# Patient Record
Sex: MALE | Race: WHITE | Employment: OTHER | URBAN - METROPOLITAN AREA
[De-identification: names, ages, dates, MRNs, and addresses within clinical notes are randomized per-mention and may not be internally consistent; named-entity substitution may affect disease eponyms.]

---

## 2017-01-18 ENCOUNTER — TELEPHONE (OUTPATIENT)
Dept: INFECTIOUS DISEASES | Age: 67
End: 2017-01-18

## 2017-01-31 ENCOUNTER — TELEPHONE (OUTPATIENT)
Dept: INFECTIOUS DISEASES | Age: 67
End: 2017-01-31

## 2017-03-09 ENCOUNTER — HOSPITAL ENCOUNTER (OUTPATIENT)
Dept: WOUND CARE | Age: 67
Discharge: HOME | End: 2017-03-09
Attending: PODIATRIST | Admitting: PODIATRIST

## 2017-03-09 VITALS
RESPIRATION RATE: 20 BRPM | SYSTOLIC BLOOD PRESSURE: 108 MMHG | HEIGHT: 76 IN | TEMPERATURE: 98.4 F | BODY MASS INDEX: 31.92 KG/M2 | WEIGHT: 262.13 LBS | DIASTOLIC BLOOD PRESSURE: 69 MMHG | HEART RATE: 67 BPM

## 2017-03-09 DIAGNOSIS — Z79.4 CONTROLLED TYPE 2 DIABETES MELLITUS WITH COMPLICATION, WITH LONG-TERM CURRENT USE OF INSULIN (HCC): ICD-10-CM

## 2017-03-09 DIAGNOSIS — L97.509 TYPE 2 DIABETES MELLITUS WITH FOOT ULCER, WITH LONG-TERM CURRENT USE OF INSULIN (HCC): ICD-10-CM

## 2017-03-09 DIAGNOSIS — Z79.4 TYPE 2 DIABETES MELLITUS WITH FOOT ULCER, WITH LONG-TERM CURRENT USE OF INSULIN (HCC): ICD-10-CM

## 2017-03-09 DIAGNOSIS — I73.9 PERIPHERAL VASCULAR DISEASE, UNSPECIFIED (HCC): Primary | ICD-10-CM

## 2017-03-09 DIAGNOSIS — E11.621 TYPE 2 DIABETES MELLITUS WITH FOOT ULCER, WITH LONG-TERM CURRENT USE OF INSULIN (HCC): ICD-10-CM

## 2017-03-09 DIAGNOSIS — E11.8 CONTROLLED TYPE 2 DIABETES MELLITUS WITH COMPLICATION, WITH LONG-TERM CURRENT USE OF INSULIN (HCC): ICD-10-CM

## 2017-03-09 LAB
HCT VFR BLD CALC: 41.1 % (ref 40.5–52.5)
HEMOGLOBIN: 13.9 G/DL (ref 13.5–17.5)
MCH RBC QN AUTO: 30.6 PG (ref 26–34)
MCHC RBC AUTO-ENTMCNC: 33.9 G/DL (ref 31–36)
MCV RBC AUTO: 90.3 FL (ref 80–100)
PDW BLD-RTO: 13.2 % (ref 12.4–15.4)
PLATELET # BLD: 305 K/UL (ref 135–450)
PMV BLD AUTO: 9.1 FL (ref 5–10.5)
RBC # BLD: 4.55 M/UL (ref 4.2–5.9)
SEDIMENTATION RATE, ERYTHROCYTE: 28 MM/HR (ref 0–20)
WBC # BLD: 9.8 K/UL (ref 4–11)

## 2017-03-09 RX ORDER — LIDOCAINE 50 MG/G
OINTMENT TOPICAL ONCE
Status: DISCONTINUED | OUTPATIENT
Start: 2017-03-09 | End: 2017-03-10 | Stop reason: HOSPADM

## 2017-03-10 ENCOUNTER — HOSPITAL ENCOUNTER (OUTPATIENT)
Dept: VASCULAR LAB | Age: 67
Discharge: HOME | End: 2017-03-10
Attending: PODIATRIST | Admitting: PODIATRIST

## 2017-03-10 DIAGNOSIS — I73.9 PERIPHERAL VASCULAR DISEASE (HCC): ICD-10-CM

## 2017-03-10 LAB
ESTIMATED AVERAGE GLUCOSE: 159.9 MG/DL
HBA1C MFR BLD: 7.2 %

## 2017-03-16 ENCOUNTER — HOSPITAL ENCOUNTER (OUTPATIENT)
Dept: WOUND CARE | Age: 67
Discharge: HOME | End: 2017-03-16
Attending: PODIATRIST | Admitting: PODIATRIST

## 2017-03-16 VITALS — SYSTOLIC BLOOD PRESSURE: 108 MMHG | DIASTOLIC BLOOD PRESSURE: 69 MMHG | TEMPERATURE: 98.6 F | HEART RATE: 87 BPM

## 2017-03-16 RX ORDER — LIDOCAINE 50 MG/G
OINTMENT TOPICAL PRN
Status: DISCONTINUED | OUTPATIENT
Start: 2017-03-16 | End: 2017-03-17 | Stop reason: HOSPADM

## 2017-03-23 ENCOUNTER — HOSPITAL ENCOUNTER (OUTPATIENT)
Dept: WOUND CARE | Age: 67
Discharge: HOME | End: 2017-03-23
Attending: PODIATRIST | Admitting: PODIATRIST

## 2017-03-23 VITALS
DIASTOLIC BLOOD PRESSURE: 73 MMHG | TEMPERATURE: 97.6 F | HEART RATE: 66 BPM | RESPIRATION RATE: 20 BRPM | SYSTOLIC BLOOD PRESSURE: 137 MMHG

## 2017-03-23 RX ORDER — LIDOCAINE 50 MG/G
OINTMENT TOPICAL ONCE
Status: DISCONTINUED | OUTPATIENT
Start: 2017-03-23 | End: 2017-03-24 | Stop reason: HOSPADM

## 2017-03-27 ENCOUNTER — HOSPITAL ENCOUNTER (OUTPATIENT)
Dept: WOUND CARE | Age: 67
Discharge: HOME | End: 2017-03-27
Attending: PODIATRIST | Admitting: PODIATRIST

## 2017-03-27 VITALS
DIASTOLIC BLOOD PRESSURE: 69 MMHG | SYSTOLIC BLOOD PRESSURE: 106 MMHG | TEMPERATURE: 97.5 F | RESPIRATION RATE: 20 BRPM | HEART RATE: 91 BPM

## 2017-03-27 RX ORDER — LANCETS 33 GAUGE
EACH MISCELLANEOUS
Qty: 100 EACH | Refills: 5 | Status: SHIPPED | OUTPATIENT
Start: 2017-03-27 | End: 2018-03-28 | Stop reason: SDUPTHER

## 2017-03-27 RX ORDER — ATORVASTATIN CALCIUM 10 MG/1
TABLET, FILM COATED ORAL
Qty: 90 TABLET | Refills: 3 | Status: SHIPPED | OUTPATIENT
Start: 2017-03-27 | End: 2018-05-01 | Stop reason: SDUPTHER

## 2017-03-30 ENCOUNTER — HOSPITAL ENCOUNTER (OUTPATIENT)
Dept: WOUND CARE | Age: 67
Discharge: HOME | End: 2017-03-30
Attending: PODIATRIST | Admitting: PODIATRIST

## 2017-03-30 VITALS
TEMPERATURE: 98.3 F | DIASTOLIC BLOOD PRESSURE: 73 MMHG | SYSTOLIC BLOOD PRESSURE: 120 MMHG | RESPIRATION RATE: 20 BRPM | HEART RATE: 65 BPM

## 2017-03-30 ASSESSMENT — PAIN SCALES - GENERAL: PAINLEVEL_OUTOF10: 0

## 2017-04-03 ENCOUNTER — HOSPITAL ENCOUNTER (OUTPATIENT)
Dept: WOUND CARE | Age: 67
Discharge: HOME | End: 2017-04-03
Attending: PODIATRIST | Admitting: PODIATRIST

## 2017-04-03 VITALS
DIASTOLIC BLOOD PRESSURE: 74 MMHG | SYSTOLIC BLOOD PRESSURE: 124 MMHG | RESPIRATION RATE: 18 BRPM | HEART RATE: 67 BPM | TEMPERATURE: 98.1 F

## 2017-04-03 ASSESSMENT — PAIN SCALES - GENERAL: PAINLEVEL_OUTOF10: 0

## 2017-04-06 ENCOUNTER — HOSPITAL ENCOUNTER (OUTPATIENT)
Dept: WOUND CARE | Age: 67
Discharge: HOME | End: 2017-04-06
Attending: PODIATRIST | Admitting: PODIATRIST

## 2017-04-06 VITALS
TEMPERATURE: 97.9 F | DIASTOLIC BLOOD PRESSURE: 74 MMHG | RESPIRATION RATE: 20 BRPM | SYSTOLIC BLOOD PRESSURE: 106 MMHG | HEART RATE: 70 BPM

## 2017-04-06 ASSESSMENT — PAIN SCALES - GENERAL: PAINLEVEL_OUTOF10: 0

## 2017-04-20 ENCOUNTER — HOSPITAL ENCOUNTER (OUTPATIENT)
Dept: WOUND CARE | Age: 67
Discharge: HOME | End: 2017-04-20
Admitting: PODIATRIST

## 2017-04-20 VITALS
HEART RATE: 60 BPM | TEMPERATURE: 98.3 F | SYSTOLIC BLOOD PRESSURE: 119 MMHG | RESPIRATION RATE: 20 BRPM | DIASTOLIC BLOOD PRESSURE: 70 MMHG

## 2017-04-20 RX ORDER — LIDOCAINE 50 MG/G
OINTMENT TOPICAL ONCE
Status: DISCONTINUED | OUTPATIENT
Start: 2017-04-20 | End: 2017-04-21 | Stop reason: HOSPADM

## 2017-05-03 RX ORDER — CLOPIDOGREL BISULFATE 75 MG/1
TABLET ORAL
Qty: 90 TABLET | Refills: 3 | Status: SHIPPED | OUTPATIENT
Start: 2017-05-03 | End: 2018-05-07 | Stop reason: ALTCHOICE

## 2017-05-03 RX ORDER — GLIPIZIDE 5 MG/1
TABLET ORAL
Qty: 360 TABLET | Refills: 3 | Status: SHIPPED | OUTPATIENT
Start: 2017-05-03 | End: 2018-06-17 | Stop reason: SDUPTHER

## 2017-05-04 ENCOUNTER — HOSPITAL ENCOUNTER (OUTPATIENT)
Dept: WOUND CARE | Age: 67
Discharge: HOME | End: 2017-05-04
Attending: PODIATRIST | Admitting: PODIATRIST

## 2017-05-04 VITALS
TEMPERATURE: 98.6 F | WEIGHT: 256.39 LBS | SYSTOLIC BLOOD PRESSURE: 119 MMHG | HEART RATE: 70 BPM | DIASTOLIC BLOOD PRESSURE: 67 MMHG | RESPIRATION RATE: 20 BRPM | BODY MASS INDEX: 31.21 KG/M2

## 2017-06-01 ENCOUNTER — HOSPITAL ENCOUNTER (OUTPATIENT)
Dept: WOUND CARE | Age: 67
Discharge: HOME | End: 2017-06-01
Attending: PODIATRIST | Admitting: PODIATRIST

## 2017-06-01 VITALS
BODY MASS INDEX: 30.77 KG/M2 | DIASTOLIC BLOOD PRESSURE: 63 MMHG | SYSTOLIC BLOOD PRESSURE: 117 MMHG | RESPIRATION RATE: 20 BRPM | HEIGHT: 76 IN | HEART RATE: 76 BPM | TEMPERATURE: 98.1 F | WEIGHT: 252.65 LBS

## 2017-06-08 ENCOUNTER — HOSPITAL ENCOUNTER (OUTPATIENT)
Dept: WOUND CARE | Age: 67
Discharge: HOME | End: 2017-06-08
Attending: NURSE PRACTITIONER | Admitting: NURSE PRACTITIONER

## 2017-06-08 VITALS
HEART RATE: 69 BPM | RESPIRATION RATE: 18 BRPM | WEIGHT: 251.77 LBS | BODY MASS INDEX: 30.66 KG/M2 | SYSTOLIC BLOOD PRESSURE: 122 MMHG | HEIGHT: 76 IN | DIASTOLIC BLOOD PRESSURE: 72 MMHG | TEMPERATURE: 98.1 F

## 2017-06-08 DIAGNOSIS — L97.522 SKIN ULCER OF TOE OF LEFT FOOT WITH FAT LAYER EXPOSED (HCC): Primary | ICD-10-CM

## 2017-06-08 PROCEDURE — 11042 DBRDMT SUBQ TIS 1ST 20SQCM/<: CPT | Performed by: NURSE PRACTITIONER

## 2017-06-08 RX ORDER — LIDOCAINE 50 MG/G
OINTMENT TOPICAL ONCE
Status: DISCONTINUED | OUTPATIENT
Start: 2017-06-08 | End: 2017-06-09 | Stop reason: HOSPADM

## 2017-06-08 ASSESSMENT — PAIN SCALES - GENERAL: PAINLEVEL_OUTOF10: 0

## 2017-06-15 ENCOUNTER — HOSPITAL ENCOUNTER (OUTPATIENT)
Dept: WOUND CARE | Age: 67
Discharge: HOME | End: 2017-06-15
Attending: PODIATRIST | Admitting: PODIATRIST

## 2017-06-15 VITALS
DIASTOLIC BLOOD PRESSURE: 68 MMHG | TEMPERATURE: 98.6 F | SYSTOLIC BLOOD PRESSURE: 121 MMHG | HEART RATE: 76 BPM | RESPIRATION RATE: 18 BRPM

## 2017-07-06 ENCOUNTER — HOSPITAL ENCOUNTER (OUTPATIENT)
Dept: WOUND CARE | Age: 67
Discharge: HOME | End: 2017-07-06
Attending: PODIATRIST | Admitting: PODIATRIST

## 2017-07-06 VITALS
DIASTOLIC BLOOD PRESSURE: 78 MMHG | SYSTOLIC BLOOD PRESSURE: 139 MMHG | WEIGHT: 256.39 LBS | HEIGHT: 76 IN | HEART RATE: 67 BPM | BODY MASS INDEX: 31.22 KG/M2 | TEMPERATURE: 98.1 F | RESPIRATION RATE: 18 BRPM

## 2017-07-06 RX ORDER — LIDOCAINE 50 MG/G
OINTMENT TOPICAL PRN
Status: DISCONTINUED | OUTPATIENT
Start: 2017-07-06 | End: 2017-07-07 | Stop reason: HOSPADM

## 2017-07-13 ENCOUNTER — HOSPITAL ENCOUNTER (OUTPATIENT)
Dept: WOUND CARE | Age: 67
Discharge: HOME | End: 2017-07-13
Attending: PODIATRIST | Admitting: PODIATRIST

## 2017-07-13 VITALS
TEMPERATURE: 98.3 F | RESPIRATION RATE: 20 BRPM | DIASTOLIC BLOOD PRESSURE: 77 MMHG | HEART RATE: 67 BPM | SYSTOLIC BLOOD PRESSURE: 134 MMHG

## 2017-07-17 RX ORDER — INSULIN DETEMIR 100 [IU]/ML
INJECTION, SOLUTION SUBCUTANEOUS
Qty: 15 PEN | Refills: 5 | Status: SHIPPED | OUTPATIENT
Start: 2017-07-17 | End: 2018-05-30

## 2017-07-20 ENCOUNTER — HOSPITAL ENCOUNTER (OUTPATIENT)
Dept: WOUND CARE | Age: 67
Discharge: HOME | End: 2017-07-20
Attending: PODIATRIST | Admitting: PODIATRIST

## 2017-07-20 VITALS
SYSTOLIC BLOOD PRESSURE: 143 MMHG | DIASTOLIC BLOOD PRESSURE: 78 MMHG | RESPIRATION RATE: 18 BRPM | TEMPERATURE: 98.1 F | HEART RATE: 64 BPM

## 2017-07-20 RX ORDER — LIDOCAINE 50 MG/G
OINTMENT TOPICAL ONCE
Status: DISCONTINUED | OUTPATIENT
Start: 2017-07-20 | End: 2017-07-21 | Stop reason: HOSPADM

## 2017-07-20 ASSESSMENT — PAIN SCALES - GENERAL: PAINLEVEL_OUTOF10: 0

## 2017-07-27 ENCOUNTER — HOSPITAL ENCOUNTER (OUTPATIENT)
Dept: WOUND CARE | Age: 67
Discharge: HOME | End: 2017-07-27
Attending: PODIATRIST | Admitting: PODIATRIST

## 2017-08-03 ENCOUNTER — HOSPITAL ENCOUNTER (OUTPATIENT)
Dept: WOUND CARE | Age: 67
Discharge: HOME | End: 2017-08-03
Attending: PODIATRIST | Admitting: PODIATRIST

## 2017-08-03 VITALS
TEMPERATURE: 98.6 F | HEART RATE: 80 BPM | HEIGHT: 76 IN | RESPIRATION RATE: 18 BRPM | DIASTOLIC BLOOD PRESSURE: 79 MMHG | BODY MASS INDEX: 30.31 KG/M2 | WEIGHT: 248.9 LBS | SYSTOLIC BLOOD PRESSURE: 144 MMHG

## 2017-08-03 RX ORDER — LIDOCAINE 50 MG/G
OINTMENT TOPICAL ONCE
Status: DISCONTINUED | OUTPATIENT
Start: 2017-08-03 | End: 2017-08-04 | Stop reason: HOSPADM

## 2017-08-03 ASSESSMENT — PAIN SCALES - GENERAL: PAINLEVEL_OUTOF10: 0

## 2017-08-10 ENCOUNTER — HOSPITAL ENCOUNTER (OUTPATIENT)
Dept: WOUND CARE | Age: 67
Discharge: HOME | End: 2017-08-10
Attending: PODIATRIST | Admitting: PODIATRIST

## 2017-08-10 VITALS
HEART RATE: 96 BPM | SYSTOLIC BLOOD PRESSURE: 119 MMHG | DIASTOLIC BLOOD PRESSURE: 73 MMHG | TEMPERATURE: 98.3 F | RESPIRATION RATE: 18 BRPM

## 2017-08-10 RX ORDER — LIDOCAINE 50 MG/G
OINTMENT TOPICAL PRN
Status: DISCONTINUED | OUTPATIENT
Start: 2017-08-10 | End: 2017-08-11 | Stop reason: HOSPADM

## 2017-08-24 ENCOUNTER — HOSPITAL ENCOUNTER (OUTPATIENT)
Dept: WOUND CARE | Age: 67
Discharge: HOME | End: 2017-08-24
Attending: PODIATRIST | Admitting: PODIATRIST

## 2017-08-24 VITALS
SYSTOLIC BLOOD PRESSURE: 110 MMHG | TEMPERATURE: 98.4 F | HEART RATE: 75 BPM | RESPIRATION RATE: 20 BRPM | DIASTOLIC BLOOD PRESSURE: 70 MMHG

## 2017-08-24 RX ORDER — LIDOCAINE 50 MG/G
OINTMENT TOPICAL ONCE
Status: DISCONTINUED | OUTPATIENT
Start: 2017-08-24 | End: 2017-08-25 | Stop reason: HOSPADM

## 2017-09-12 ENCOUNTER — OFFICE VISIT (OUTPATIENT)
Dept: INTERNAL MEDICINE CLINIC | Age: 67
End: 2017-09-12

## 2017-09-12 VITALS
SYSTOLIC BLOOD PRESSURE: 130 MMHG | WEIGHT: 245 LBS | DIASTOLIC BLOOD PRESSURE: 78 MMHG | HEART RATE: 70 BPM | RESPIRATION RATE: 16 BRPM | BODY MASS INDEX: 29.82 KG/M2

## 2017-09-12 DIAGNOSIS — R41.3 MEMORY IMPAIRMENT: ICD-10-CM

## 2017-09-12 DIAGNOSIS — R63.4 WEIGHT LOSS: Primary | ICD-10-CM

## 2017-09-12 DIAGNOSIS — Z23 NEED FOR INFLUENZA VACCINATION: ICD-10-CM

## 2017-09-12 DIAGNOSIS — Z23 NEED FOR PNEUMOCOCCAL VACCINATION: ICD-10-CM

## 2017-09-12 DIAGNOSIS — N40.1 BENIGN NON-NODULAR PROSTATIC HYPERPLASIA WITH LOWER URINARY TRACT SYMPTOMS: ICD-10-CM

## 2017-09-12 DIAGNOSIS — E11.8 CONTROLLED TYPE 2 DIABETES MELLITUS WITH COMPLICATION, WITH LONG-TERM CURRENT USE OF INSULIN (HCC): ICD-10-CM

## 2017-09-12 DIAGNOSIS — Z79.4 CONTROLLED TYPE 2 DIABETES MELLITUS WITH COMPLICATION, WITH LONG-TERM CURRENT USE OF INSULIN (HCC): ICD-10-CM

## 2017-09-12 LAB
A/G RATIO: 1.6 (ref 1.1–2.2)
ALBUMIN SERPL-MCNC: 4.2 G/DL (ref 3.4–5)
ALP BLD-CCNC: 80 U/L (ref 40–129)
ALT SERPL-CCNC: 13 U/L (ref 10–40)
ANION GAP SERPL CALCULATED.3IONS-SCNC: 15 MMOL/L (ref 3–16)
AST SERPL-CCNC: 16 U/L (ref 15–37)
BASOPHILS ABSOLUTE: 0.1 K/UL (ref 0–0.2)
BASOPHILS RELATIVE PERCENT: 0.6 %
BILIRUB SERPL-MCNC: 0.8 MG/DL (ref 0–1)
BUN BLDV-MCNC: 19 MG/DL (ref 7–20)
CALCIUM SERPL-MCNC: 9.3 MG/DL (ref 8.3–10.6)
CHLORIDE BLD-SCNC: 100 MMOL/L (ref 99–110)
CO2: 25 MMOL/L (ref 21–32)
CREAT SERPL-MCNC: 1 MG/DL (ref 0.8–1.3)
EOSINOPHILS ABSOLUTE: 0.2 K/UL (ref 0–0.6)
EOSINOPHILS RELATIVE PERCENT: 2.9 %
GFR AFRICAN AMERICAN: >60
GFR NON-AFRICAN AMERICAN: >60
GLOBULIN: 2.7 G/DL
GLUCOSE BLD-MCNC: 111 MG/DL (ref 70–99)
HBA1C MFR BLD: 7.2 %
HCT VFR BLD CALC: 36.6 % (ref 40.5–52.5)
HEMOGLOBIN: 12.6 G/DL (ref 13.5–17.5)
LYMPHOCYTES ABSOLUTE: 1 K/UL (ref 1–5.1)
LYMPHOCYTES RELATIVE PERCENT: 12.2 %
MCH RBC QN AUTO: 31.1 PG (ref 26–34)
MCHC RBC AUTO-ENTMCNC: 34.3 G/DL (ref 31–36)
MCV RBC AUTO: 90.7 FL (ref 80–100)
MONOCYTES ABSOLUTE: 0.8 K/UL (ref 0–1.3)
MONOCYTES RELATIVE PERCENT: 9.5 %
NEUTROPHILS ABSOLUTE: 6.2 K/UL (ref 1.7–7.7)
NEUTROPHILS RELATIVE PERCENT: 74.8 %
PDW BLD-RTO: 13.1 % (ref 12.4–15.4)
PLATELET # BLD: 350 K/UL (ref 135–450)
PMV BLD AUTO: 8.7 FL (ref 5–10.5)
POTASSIUM SERPL-SCNC: 4.2 MMOL/L (ref 3.5–5.1)
PROSTATE SPECIFIC ANTIGEN: 0.37 NG/ML (ref 0–4)
RBC # BLD: 4.04 M/UL (ref 4.2–5.9)
SEDIMENTATION RATE, ERYTHROCYTE: 41 MM/HR (ref 0–20)
SODIUM BLD-SCNC: 140 MMOL/L (ref 136–145)
TOTAL PROTEIN: 6.9 G/DL (ref 6.4–8.2)
WBC # BLD: 8.3 K/UL (ref 4–11)

## 2017-09-12 PROCEDURE — 3017F COLORECTAL CA SCREEN DOC REV: CPT | Performed by: INTERNAL MEDICINE

## 2017-09-12 PROCEDURE — 83036 HEMOGLOBIN GLYCOSYLATED A1C: CPT | Performed by: INTERNAL MEDICINE

## 2017-09-12 PROCEDURE — G8427 DOCREV CUR MEDS BY ELIG CLIN: HCPCS | Performed by: INTERNAL MEDICINE

## 2017-09-12 PROCEDURE — G8419 CALC BMI OUT NRM PARAM NOF/U: HCPCS | Performed by: INTERNAL MEDICINE

## 2017-09-12 PROCEDURE — 90732 PPSV23 VACC 2 YRS+ SUBQ/IM: CPT | Performed by: INTERNAL MEDICINE

## 2017-09-12 PROCEDURE — 90662 IIV NO PRSV INCREASED AG IM: CPT | Performed by: INTERNAL MEDICINE

## 2017-09-12 PROCEDURE — 4040F PNEUMOC VAC/ADMIN/RCVD: CPT | Performed by: INTERNAL MEDICINE

## 2017-09-12 PROCEDURE — G0009 ADMIN PNEUMOCOCCAL VACCINE: HCPCS | Performed by: INTERNAL MEDICINE

## 2017-09-12 PROCEDURE — 99214 OFFICE O/P EST MOD 30 MIN: CPT | Performed by: INTERNAL MEDICINE

## 2017-09-12 PROCEDURE — 36415 COLL VENOUS BLD VENIPUNCTURE: CPT | Performed by: INTERNAL MEDICINE

## 2017-09-12 PROCEDURE — G8598 ASA/ANTIPLAT THER USED: HCPCS | Performed by: INTERNAL MEDICINE

## 2017-09-12 PROCEDURE — G0008 ADMIN INFLUENZA VIRUS VAC: HCPCS | Performed by: INTERNAL MEDICINE

## 2017-09-12 PROCEDURE — 1123F ACP DISCUSS/DSCN MKR DOCD: CPT | Performed by: INTERNAL MEDICINE

## 2017-09-12 PROCEDURE — 3046F HEMOGLOBIN A1C LEVEL >9.0%: CPT | Performed by: INTERNAL MEDICINE

## 2017-09-12 PROCEDURE — 1036F TOBACCO NON-USER: CPT | Performed by: INTERNAL MEDICINE

## 2017-09-12 ASSESSMENT — ENCOUNTER SYMPTOMS
GASTROINTESTINAL NEGATIVE: 1
EYES NEGATIVE: 1
RESPIRATORY NEGATIVE: 1
COUGH: 0
BACK PAIN: 0
CHEST TIGHTNESS: 0
SHORTNESS OF BREATH: 0

## 2017-09-12 ASSESSMENT — PATIENT HEALTH QUESTIONNAIRE - PHQ9
1. LITTLE INTEREST OR PLEASURE IN DOING THINGS: 0
SUM OF ALL RESPONSES TO PHQ QUESTIONS 1-9: 0
2. FEELING DOWN, DEPRESSED OR HOPELESS: 0
SUM OF ALL RESPONSES TO PHQ9 QUESTIONS 1 & 2: 0

## 2017-09-13 ENCOUNTER — NURSE ONLY (OUTPATIENT)
Dept: INTERNAL MEDICINE CLINIC | Age: 67
End: 2017-09-13

## 2017-09-13 ENCOUNTER — TELEPHONE (OUTPATIENT)
Dept: INTERNAL MEDICINE CLINIC | Age: 67
End: 2017-09-13

## 2017-09-13 DIAGNOSIS — D64.9 ANEMIA, UNSPECIFIED TYPE: Primary | ICD-10-CM

## 2017-09-13 DIAGNOSIS — D64.9 ANEMIA, UNSPECIFIED TYPE: ICD-10-CM

## 2017-09-13 LAB
FERRITIN: 61.6 NG/ML (ref 30–400)
FOLATE: 18.88 NG/ML (ref 4.78–24.2)
IRON SATURATION: 13 % (ref 20–50)
IRON: 40 UG/DL (ref 59–158)
TOTAL IRON BINDING CAPACITY: 299 UG/DL (ref 260–445)
TSH REFLEX: 1.23 UIU/ML (ref 0.27–4.2)
VITAMIN B-12: 305 PG/ML (ref 211–911)

## 2017-09-13 PROCEDURE — 36415 COLL VENOUS BLD VENIPUNCTURE: CPT | Performed by: INTERNAL MEDICINE

## 2017-09-25 ENCOUNTER — OFFICE VISIT (OUTPATIENT)
Dept: INTERNAL MEDICINE CLINIC | Age: 67
End: 2017-09-25

## 2017-09-25 VITALS
SYSTOLIC BLOOD PRESSURE: 126 MMHG | HEART RATE: 82 BPM | WEIGHT: 242 LBS | TEMPERATURE: 98.8 F | RESPIRATION RATE: 18 BRPM | BODY MASS INDEX: 29.46 KG/M2 | DIASTOLIC BLOOD PRESSURE: 80 MMHG

## 2017-09-25 DIAGNOSIS — R63.4 WEIGHT LOSS: Primary | ICD-10-CM

## 2017-09-25 DIAGNOSIS — L23.7 ALLERGIC CONTACT DERMATITIS DUE TO PLANTS, EXCEPT FOOD: ICD-10-CM

## 2017-09-25 PROCEDURE — 1036F TOBACCO NON-USER: CPT | Performed by: INTERNAL MEDICINE

## 2017-09-25 PROCEDURE — G8427 DOCREV CUR MEDS BY ELIG CLIN: HCPCS | Performed by: INTERNAL MEDICINE

## 2017-09-25 PROCEDURE — 4040F PNEUMOC VAC/ADMIN/RCVD: CPT | Performed by: INTERNAL MEDICINE

## 2017-09-25 PROCEDURE — 3017F COLORECTAL CA SCREEN DOC REV: CPT | Performed by: INTERNAL MEDICINE

## 2017-09-25 PROCEDURE — 1123F ACP DISCUSS/DSCN MKR DOCD: CPT | Performed by: INTERNAL MEDICINE

## 2017-09-25 PROCEDURE — G8419 CALC BMI OUT NRM PARAM NOF/U: HCPCS | Performed by: INTERNAL MEDICINE

## 2017-09-25 PROCEDURE — G8598 ASA/ANTIPLAT THER USED: HCPCS | Performed by: INTERNAL MEDICINE

## 2017-09-25 PROCEDURE — 99213 OFFICE O/P EST LOW 20 MIN: CPT | Performed by: INTERNAL MEDICINE

## 2017-09-25 ASSESSMENT — ENCOUNTER SYMPTOMS: GASTROINTESTINAL NEGATIVE: 1

## 2017-09-27 ENCOUNTER — NURSE ONLY (OUTPATIENT)
Dept: INTERNAL MEDICINE CLINIC | Age: 67
End: 2017-09-27

## 2017-09-27 DIAGNOSIS — D64.9 ANEMIA, UNSPECIFIED TYPE: Primary | ICD-10-CM

## 2017-09-27 LAB
HEMOCCULT STL QL: POSITIVE

## 2017-10-19 ENCOUNTER — HOSPITAL ENCOUNTER (OUTPATIENT)
Dept: ENDOSCOPY | Age: 67
Discharge: HOME | End: 2017-10-19
Attending: INTERNAL MEDICINE | Admitting: INTERNAL MEDICINE

## 2017-10-19 VITALS
HEART RATE: 81 BPM | OXYGEN SATURATION: 99 % | TEMPERATURE: 98.5 F | DIASTOLIC BLOOD PRESSURE: 70 MMHG | HEIGHT: 76 IN | RESPIRATION RATE: 19 BRPM | BODY MASS INDEX: 28.98 KG/M2 | WEIGHT: 238 LBS | SYSTOLIC BLOOD PRESSURE: 126 MMHG

## 2017-10-19 LAB
GLUCOSE BLD-MCNC: 90 MG/DL (ref 70–99)
PERFORMED ON: NORMAL

## 2017-10-19 RX ORDER — ONDANSETRON 2 MG/ML
4 INJECTION INTRAMUSCULAR; INTRAVENOUS
Status: ACTIVE | OUTPATIENT
Start: 2017-10-19 | End: 2017-10-19

## 2017-10-19 RX ORDER — SODIUM CHLORIDE 9 MG/ML
INJECTION, SOLUTION INTRAVENOUS CONTINUOUS
Status: DISCONTINUED | OUTPATIENT
Start: 2017-10-19 | End: 2017-10-20 | Stop reason: HOSPADM

## 2017-10-19 RX ORDER — SODIUM CHLORIDE 0.9 % (FLUSH) 0.9 %
10 SYRINGE (ML) INJECTION EVERY 12 HOURS SCHEDULED
Status: DISCONTINUED | OUTPATIENT
Start: 2017-10-19 | End: 2017-10-20 | Stop reason: HOSPADM

## 2017-10-19 RX ORDER — SODIUM CHLORIDE 0.9 % (FLUSH) 0.9 %
10 SYRINGE (ML) INJECTION PRN
Status: DISCONTINUED | OUTPATIENT
Start: 2017-10-19 | End: 2017-10-20 | Stop reason: HOSPADM

## 2017-10-19 RX ADMIN — SODIUM CHLORIDE: 9 INJECTION, SOLUTION INTRAVENOUS at 11:38

## 2017-10-19 ASSESSMENT — PAIN - FUNCTIONAL ASSESSMENT: PAIN_FUNCTIONAL_ASSESSMENT: 0-10

## 2017-10-19 ASSESSMENT — PAIN SCALES - GENERAL
PAINLEVEL_OUTOF10: 0

## 2017-10-19 NOTE — ANESTHESIA POST-OP
[facility] Department of Anesthesiology  Post-Anesthesia Note       Name:  Kareen Ramirez                                         Age:  75 y.o.   MRN:  [id number]     Last Vitals & Oxygen Saturation: /70   Pulse 81   Temp 98.5 °F (36.9 °C) (Temporal)   Resp 19   Ht 6' 4\" (1.93 m)   Wt 238 lb (108 kg)   SpO2 99%   BMI 28.97 kg/m²   Patient Vitals for the past 4 hrs:   BP Temp Temp src Pulse Resp SpO2 Height Weight   10/19/17 1252 126/70 - - 81 19 99 % - -   10/19/17 1242 104/62 - - 87 17 99 % - -   10/19/17 1232 (!) 95/47 98.5 °F (36.9 °C) Temporal 99 17 99 % - -   10/19/17 1136 (!) 135/93 97.8 °F (36.6 °C) Tympanic 98 18 98 % 6' 4\" (1.93 m) 238 lb (108 kg)       Level of consciousness: awake, alert and oriented    Respiratory: stable     Cardiovascular: stable     Hydration: stable     PONV: stable     Post-op pain: adequate analgesia    Post-op assessment: no apparent anesthetic complications    Complications:  none    Galilea Dash MD  October 19, 2017   1:25 PM

## 2017-10-19 NOTE — PROGRESS NOTES
Dr Kyung Curtis at bedside speaking with patient & wife. Both expressed understanding of discharge instructions.

## 2017-10-19 NOTE — BRIEF OP NOTE
Brief Postoperative Note  Jamel Garcia  1950  [id number]    Previous Colonoscopy: Yes  Date: 11/23/09  Greater than 3 years?  Yes    Pre-operative Diagnosis: Guaiac positive stool    Post-operative Diagnosis: Same    Procedure: Colonoscopy    Anesthesia: MAC    Surgeons/Assistants: Mariann    Estimated Blood Loss: None    Complications: None    Specimens: Was Not Obtained    Findings: See dictated report    Electronically signed by Jaycee Steele MD, on 10/19/2017, at 12:32 PM

## 2017-10-19 NOTE — ANESTHESIA PRE-OP
Department of Anesthesiology  Preprocedure Note       Name:  Juan Hoover   Age:  75 y.o.  :  1950                                          MRN:  [id number]         Date:  10/19/2017        [facility] Department of Anesthesiology  Pre-Anesthesia Evaluation/Consultation       Name:  Juan Hoover                                         Age:  75 y.o.   MRN:  [id number]           Procedure (Scheduled):  colonoscopy  Surgeon:  Dr. Philomena Guadarrama      No Known Allergies  Patient Active Problem List   Diagnosis    Skin ulcer of toe of left foot with fat layer exposed ([address]    Diabetes mellitus ([address]    BPH with obstruction/lower urinary tract symptoms    ED (erectile dysfunction)    Cerebellar infarct    Diabetic nephropathy with proteinuria ([address]    Diabetic peripheral neuropathy ([address]    Diabetes mellitus type 2, uncontrolled ([address]    Essential hypertension    Controlled type 2 diabetes mellitus with complication, with long-term current use of insulin (HCC)    Mixed hyperlipidemia    Toe osteomyelitis, left ([address]    Septic arthritis of interphalangeal joint of toe of left foot ([address]    Diabetic foot infection ([address]     Past Medical History:   Diagnosis Date    Arthritis     Cerebellar infarct ([address]     Cerebral artery occlusion with cerebral infarction ([address]       DIZZY  COULDN'T WALK   RESOLVED    Diabetes mellitus ([address]     Diabetic nephropathy with proteinuria (HCC)     Diabetic peripheral neuropathy (HCC)     Disease of blood and blood forming organ     Hyperlipidemia     Hypertension     Movement disorder      Past Surgical History:   Procedure Laterality Date    COLONOSCOPY      ENDOSCOPY, COLON, DIAGNOSTIC      FOOT SURGERY Right     Ulcer surgery    HYPOSPADIAS CORRECTION      OTHER SURGICAL HISTORY Left 2016    INCISION AND DRAINAGE LEFT HALLUX         PILONIDAL CYST EXCISION      TONSILLECTOMY       Social History   Substance Use Topics    Smoking status: Former Smoker     Packs/day: 1.00     Years: 13.00     Types: Cigarettes     Quit date: 7/20/1981    Smokeless tobacco: Never Used    Alcohol use Yes      Comment: WINE 3 X A MONTH     Medications  Current Outpatient Prescriptions on File Prior to Encounter   Medication Sig Dispense Refill    LEVEMIR FLEXTOUCH 100 UNIT/ML injection pen INJECT 35 UNITS UNDER THE SKIN NIGHTLY 15 Pen 5    glipiZIDE (GLUCOTROL) 5 MG tablet TAKE TWO TABLETS BY MOUTH TWICE A  tablet 3    clopidogrel (PLAVIX) 75 MG tablet Take 1 tablet by mouth  daily 90 tablet 3    metFORMIN (GLUCOPHAGE) 850 MG tablet TAKE 1 TABLET TWICE A DAY  WITH MEALS 180 tablet 3    atorvastatin (LIPITOR) 10 MG tablet TAKE ONE TABLET BY MOUTH DAILY 90 tablet 3    ONETOUCH DELICA LANCETS 59D MISC USE TO TEST ONCE DAILY 100 each 5    glucose blood VI test strips (ONE TOUCH ULTRA TEST) strip Test daily. ICD-10:  E11.65  Diabetes Type II Uncontrolled 100 strip 3    lisinopril (PRINIVIL;ZESTRIL) 10 MG tablet Take 1 tablet by mouth daily 90 tablet 3    B-D ULTRAFINE III SHORT PEN 31G X 8 MM MISC USE ONCE DAILY WITH LEVEMIR FLEX  each 2    ACCU-CHEK BILLY strip TEST TWO TIMES A DAY 50 strip 2     No current facility-administered medications on file prior to encounter. Current Outpatient Prescriptions   Medication Sig Dispense Refill    LEVEMIR FLEXTOUCH 100 UNIT/ML injection pen INJECT 35 UNITS UNDER THE SKIN NIGHTLY 15 Pen 5    glipiZIDE (GLUCOTROL) 5 MG tablet TAKE TWO TABLETS BY MOUTH TWICE A  tablet 3    clopidogrel (PLAVIX) 75 MG tablet Take 1 tablet by mouth  daily 90 tablet 3    metFORMIN (GLUCOPHAGE) 850 MG tablet TAKE 1 TABLET TWICE A DAY  WITH MEALS 180 tablet 3    atorvastatin (LIPITOR) 10 MG tablet TAKE ONE TABLET BY MOUTH DAILY 90 tablet 3    ONETOUCH DELICA LANCETS 14S MISC USE TO TEST ONCE DAILY 100 each 5    glucose blood VI test strips (ONE TOUCH ULTRA TEST) strip Test daily.  ICD-10:  E11.65  Diabetes Type II Uncontrolled 100 strip 3    lisinopril (PRINIVIL;ZESTRIL) 10 MG tablet Take 1 tablet by mouth daily 90 tablet 3    B-D ULTRAFINE III SHORT PEN 31G X 8 MM MISC USE ONCE DAILY WITH LEVEMIR FLEX  each 2    ACCU-CHEK BILLY strip TEST TWO TIMES A DAY 50 strip 2     No current facility-administered medications for this encounter. Vital Signs (Current) There were no vitals filed for this visit. Vital Signs Statistics (for past 48 hrs)     No Data Recorded    BP Readings from Last 3 Encounters:   09/25/17 126/80   09/12/17 130/78   08/24/17 110/70     BMI  There is no height or weight on file to calculate BMI. Estimated body mass index is 28.61 kg/m² as calculated from the following:    Height as of 10/13/17: 6' 4\" (1.93 m). Weight as of 10/13/17: 235 lb (106.6 kg). CBC   Lab Results   Component Value Date    WBC 8.3 09/12/2017    RBC 4.04 09/12/2017    HGB 12.6 09/12/2017    HCT 36.6 09/12/2017    MCV 90.7 09/12/2017    RDW 13.1 09/12/2017     09/12/2017     CMP    Lab Results   Component Value Date     09/12/2017    K 4.2 09/12/2017     09/12/2017    CO2 25 09/12/2017    BUN 19 09/12/2017    CREATININE 1.0 09/12/2017    GFRAA >60 09/12/2017    GFRAA >60 04/30/2013    AGRATIO 1.6 09/12/2017    LABGLOM >60 09/12/2017    LABGLOM 75.7 06/01/2011    GLUCOSE 111 09/12/2017    GLUCOSE 228 06/01/2011    PROT 6.9 09/12/2017    PROT 7.3 12/04/2012    CALCIUM 9.3 09/12/2017    BILITOT 0.8 09/12/2017    ALKPHOS 80 09/12/2017    AST 16 09/12/2017    ALT 13 09/12/2017     BMP    Lab Results   Component Value Date     09/12/2017    K 4.2 09/12/2017     09/12/2017    CO2 25 09/12/2017    BUN 19 09/12/2017    CREATININE 1.0 09/12/2017    CALCIUM 9.3 09/12/2017    GFRAA >60 09/12/2017    GFRAA >60 04/30/2013    LABGLOM >60 09/12/2017    LABGLOM 75.7 06/01/2011    GLUCOSE 111 09/12/2017    GLUCOSE 228 06/01/2011     POCGlucose  No results for input(s): GLUCOSE in the last 72 hours. Coags    Lab Results   Component Value Date    PROTIME 13.0 12/22/2016    INR 1.14 80/45/0272     HCG (If Applicable) No results found for: PREGTESTUR, PREGSERUM, HCG, HCGQUANT   ABGs No results found for: PHART, PO2ART, CWW8VST, TCD5IGF, BEART, S2QVXEWE   Type & Screen (If Applicable)  No results found for: LABABO, LABRH    NPO Status:                            clears >2hrs                                                       BMI:   Wt Readings from Last 3 Encounters:   10/13/17 235 lb (106.6 kg)   09/25/17 242 lb (109.8 kg)   09/12/17 245 lb (111.1 kg)     There is no height or weight on file to calculate BMI. Anesthesia Evaluation  Patient summary reviewed  Airway:         Dental:          Pulmonary:                              Cardiovascular:    (+) hypertension:, hyperlipidemia                  Neuro/Psych:   (+) CVA:, neuromuscular disease:,             GI/Hepatic/Renal:             Endo/Other:    (+) Type II DM, , blood dyscrasia: anticoagulation therapy, arthritis:. Abdominal:           Vascular:                                      Anesthesia Plan      MAC     ASA 3       Induction: intravenous. Anesthetic plan and risks discussed with patient. Plan discussed with CRNA. DOS STAFF ADDENDUM:    Pt seen and examined, chart reviewed (including anesthesia, drug and allergy history). No interval changes to history and physical examination. Anesthetic plan, risks, benefits, alternatives, and personnel involved discussed with patient. Patient verbalized an understanding and agrees to proceed.       Adry Merino MD  October 19, 2017  11:22 AM      Adry Merino MD   10/19/2017

## 2017-10-24 ENCOUNTER — OFFICE VISIT (OUTPATIENT)
Dept: INTERNAL MEDICINE CLINIC | Age: 67
End: 2017-10-24

## 2017-10-24 VITALS
DIASTOLIC BLOOD PRESSURE: 64 MMHG | SYSTOLIC BLOOD PRESSURE: 110 MMHG | WEIGHT: 245 LBS | TEMPERATURE: 98.3 F | HEART RATE: 66 BPM | BODY MASS INDEX: 29.82 KG/M2 | RESPIRATION RATE: 14 BRPM

## 2017-10-24 DIAGNOSIS — N13.8 BPH WITH OBSTRUCTION/LOWER URINARY TRACT SYMPTOMS: ICD-10-CM

## 2017-10-24 DIAGNOSIS — E11.8 CONTROLLED TYPE 2 DIABETES MELLITUS WITH COMPLICATION, WITH LONG-TERM CURRENT USE OF INSULIN (HCC): Primary | ICD-10-CM

## 2017-10-24 DIAGNOSIS — D50.0 IRON DEFICIENCY ANEMIA DUE TO CHRONIC BLOOD LOSS: ICD-10-CM

## 2017-10-24 DIAGNOSIS — Z79.4 CONTROLLED TYPE 2 DIABETES MELLITUS WITH COMPLICATION, WITH LONG-TERM CURRENT USE OF INSULIN (HCC): Primary | ICD-10-CM

## 2017-10-24 DIAGNOSIS — E11.42 DIABETIC PERIPHERAL NEUROPATHY (HCC): ICD-10-CM

## 2017-10-24 DIAGNOSIS — I10 ESSENTIAL HYPERTENSION: ICD-10-CM

## 2017-10-24 DIAGNOSIS — E78.2 MIXED HYPERLIPIDEMIA: ICD-10-CM

## 2017-10-24 DIAGNOSIS — L97.522 ULCER OF LEFT FOOT, WITH FAT LAYER EXPOSED (HCC): ICD-10-CM

## 2017-10-24 DIAGNOSIS — E11.21 DIABETIC NEPHROPATHY WITH PROTEINURIA (HCC): ICD-10-CM

## 2017-10-24 DIAGNOSIS — N40.1 BPH WITH OBSTRUCTION/LOWER URINARY TRACT SYMPTOMS: ICD-10-CM

## 2017-10-24 LAB
BASOPHILS ABSOLUTE: 0 K/UL (ref 0–0.2)
BASOPHILS RELATIVE PERCENT: 0.6 %
EOSINOPHILS ABSOLUTE: 0.1 K/UL (ref 0–0.6)
EOSINOPHILS RELATIVE PERCENT: 1.8 %
HCT VFR BLD CALC: 37.7 % (ref 40.5–52.5)
HEMOGLOBIN: 12.8 G/DL (ref 13.5–17.5)
LYMPHOCYTES ABSOLUTE: 1 K/UL (ref 1–5.1)
LYMPHOCYTES RELATIVE PERCENT: 13.3 %
MCH RBC QN AUTO: 31 PG (ref 26–34)
MCHC RBC AUTO-ENTMCNC: 33.9 G/DL (ref 31–36)
MCV RBC AUTO: 91.5 FL (ref 80–100)
MONOCYTES ABSOLUTE: 0.7 K/UL (ref 0–1.3)
MONOCYTES RELATIVE PERCENT: 9.1 %
NEUTROPHILS ABSOLUTE: 5.9 K/UL (ref 1.7–7.7)
NEUTROPHILS RELATIVE PERCENT: 75.2 %
PDW BLD-RTO: 13.1 % (ref 12.4–15.4)
PLATELET # BLD: 332 K/UL (ref 135–450)
PMV BLD AUTO: 8.7 FL (ref 5–10.5)
RBC # BLD: 4.13 M/UL (ref 4.2–5.9)
WBC # BLD: 7.8 K/UL (ref 4–11)

## 2017-10-24 PROCEDURE — 4040F PNEUMOC VAC/ADMIN/RCVD: CPT | Performed by: INTERNAL MEDICINE

## 2017-10-24 PROCEDURE — 99215 OFFICE O/P EST HI 40 MIN: CPT | Performed by: INTERNAL MEDICINE

## 2017-10-24 PROCEDURE — G8419 CALC BMI OUT NRM PARAM NOF/U: HCPCS | Performed by: INTERNAL MEDICINE

## 2017-10-24 PROCEDURE — G8427 DOCREV CUR MEDS BY ELIG CLIN: HCPCS | Performed by: INTERNAL MEDICINE

## 2017-10-24 PROCEDURE — 1123F ACP DISCUSS/DSCN MKR DOCD: CPT | Performed by: INTERNAL MEDICINE

## 2017-10-24 PROCEDURE — G8484 FLU IMMUNIZE NO ADMIN: HCPCS | Performed by: INTERNAL MEDICINE

## 2017-10-24 PROCEDURE — 3045F PR MOST RECENT HEMOGLOBIN A1C LEVEL 7.0-9.0%: CPT | Performed by: INTERNAL MEDICINE

## 2017-10-24 PROCEDURE — 1036F TOBACCO NON-USER: CPT | Performed by: INTERNAL MEDICINE

## 2017-10-24 PROCEDURE — G8598 ASA/ANTIPLAT THER USED: HCPCS | Performed by: INTERNAL MEDICINE

## 2017-10-24 PROCEDURE — 36415 COLL VENOUS BLD VENIPUNCTURE: CPT | Performed by: INTERNAL MEDICINE

## 2017-10-24 PROCEDURE — 3017F COLORECTAL CA SCREEN DOC REV: CPT | Performed by: INTERNAL MEDICINE

## 2017-10-24 ASSESSMENT — ENCOUNTER SYMPTOMS
EYES NEGATIVE: 1
GASTROINTESTINAL NEGATIVE: 1
RESPIRATORY NEGATIVE: 1

## 2017-10-24 NOTE — PROGRESS NOTES
Subjective:      Patient ID: Katherine Castillo is a 75 y.o. male. HPI  Mleiza Baer is here for follow-up of multiple issues including iron deficiency anemia/hemoccult positive stools, urinary frequency/nocturia, and memory impairment. Iron deficiency anemia/hemoccult positive stools - recent colonoscopy was negative. No obvious bleeding source noted. No UGI symptoms. May need EGD. Urinary frequency/nocturia - Meliza Baer denied that this was much of a problem but later in our visit when his wife came in the room she said \"don't you remember that you got up 4-5 times last night\". At that point he agreed which may make a cognitive assessment more important. Cognitive impairment - see above. Wife would like a cognitive assessment because of these and other issues. Review of Systems   Constitutional: Negative for activity change, appetite change, fatigue and unexpected weight change. HENT: Negative. Eyes: Negative. Respiratory: Negative. Cardiovascular: Negative. Gastrointestinal: Negative. Genitourinary: Positive for frequency and urgency. Negative for difficulty urinating. Neurological: Positive for numbness. Psychiatric/Behavioral: Negative. 14 point ros otherwise negative. Objective:   Physical Exam   Constitutional: He is oriented to person, place, and time. He appears well-developed and well-nourished. No distress. HENT:   Right Ear: External ear normal.   Left Ear: External ear normal.   Mouth/Throat: Oropharynx is clear and moist.   Eyes: EOM are normal. Pupils are equal, round, and reactive to light. Neck: No JVD present. No thyromegaly present. Cardiovascular: Normal rate, regular rhythm, normal heart sounds and intact distal pulses. Pulmonary/Chest: Effort normal and breath sounds normal. No respiratory distress. He has no wheezes. He has no rales. Abdominal: Soft. Bowel sounds are normal. He exhibits no distension and no mass. There is no tenderness.  There is no rebound and no guarding. Musculoskeletal: He exhibits no edema. Neurological: He is alert and oriented to person, place, and time. Monofilament decreased to the mid calf. Skin: Skin is warm and dry. Psychiatric: He has a normal mood and affect. Assessment:      1. Controlled type 2 diabetes mellitus with complication, with long-term current use of insulin ([address]    2. Diabetic peripheral neuropathy ([address]    3. Diabetic nephropathy with proteinuria ([address]    4. Essential hypertension    5. Mixed hyperlipidemia    6. BPH with obstruction/lower urinary tract symptoms    7. Iron deficiency anemia due to chronic blood loss            Plan:      Arturo Mallory was seen today for annual exam.    Diagnoses and all orders for this visit:    Controlled type 2 diabetes mellitus with complication, with long-term current use of insulin ([address] - recent A1C was 7.2. Will continue Levemir and Glipizide. Diabetic peripheral neuropathy ([address] stable        - Continue frequent podiatry visits. Diabetic nephropathy with proteinuria (HCC)    Essential hypertension, controlled        - Continue Lisinopril    Mixed hyperlipidemia, controlled        - Continue Atorvastatin        - Needs fasting lipids    BPH with obstruction/lower urinary tract symptoms        - Will consider Tamsulosin    Iron deficiency anemia due to chronic blood loss  -     Consider EGD  -     POCT Fecal Immunochemical Test (FIT); Future  -     CBC Auto Differential    Cognitive impairment        - Cognitive assessment ordered. Time spent: > 40 minutes with > 50% in counseling (patient/wife) and coordination of care.

## 2017-10-31 ENCOUNTER — TELEPHONE (OUTPATIENT)
Dept: INTERNAL MEDICINE CLINIC | Age: 67
End: 2017-10-31

## 2017-10-31 NOTE — TELEPHONE ENCOUNTER
Mrs. Billy Render called, Dr Jacklyn Acosta is booked until April 2018 for cognitive evaluations.  Should I still send in a referral or is there another MD we can refer to for a memory assessment

## 2017-11-02 ENCOUNTER — NURSE ONLY (OUTPATIENT)
Dept: INTERNAL MEDICINE CLINIC | Age: 67
End: 2017-11-02

## 2017-11-02 DIAGNOSIS — D50.0 IRON DEFICIENCY ANEMIA DUE TO CHRONIC BLOOD LOSS: ICD-10-CM

## 2017-11-02 DIAGNOSIS — Z12.11 COLON CANCER SCREENING: Primary | ICD-10-CM

## 2017-11-02 LAB
CONTROL: YES
HEMOCCULT STL QL: NEGATIVE

## 2017-11-02 PROCEDURE — 82274 ASSAY TEST FOR BLOOD FECAL: CPT | Performed by: INTERNAL MEDICINE

## 2017-11-06 ENCOUNTER — NURSE ONLY (OUTPATIENT)
Dept: INTERNAL MEDICINE CLINIC | Age: 67
End: 2017-11-06

## 2017-11-06 DIAGNOSIS — Z00.00 HEALTH CARE MAINTENANCE: Primary | ICD-10-CM

## 2017-11-06 LAB
CHOLESTEROL, FASTING: 133 MG/DL (ref 0–199)
HDLC SERPL-MCNC: 46 MG/DL (ref 40–60)
LDL CHOLESTEROL CALCULATED: 74 MG/DL
TRIGLYCERIDE, FASTING: 66 MG/DL (ref 0–150)
VLDLC SERPL CALC-MCNC: 13 MG/DL

## 2017-11-07 RX ORDER — TAMSULOSIN HYDROCHLORIDE 0.4 MG/1
0.4 CAPSULE ORAL DAILY
Qty: 30 CAPSULE | Refills: 3 | Status: SHIPPED | OUTPATIENT
Start: 2017-11-07 | End: 2018-02-19

## 2017-11-09 ENCOUNTER — PAT TELEPHONE (OUTPATIENT)
Dept: PREADMISSION TESTING | Age: 67
End: 2017-11-09

## 2017-11-09 VITALS — BODY MASS INDEX: 29.22 KG/M2 | HEIGHT: 76 IN | WEIGHT: 240 LBS

## 2017-11-09 NOTE — PROGRESS NOTES
[address] [facility] time___1130_________        Surgery time__1230__________    Take the following medications with a sip of water:    Do not eat or drink anything after 12:00 midnight prior to your surgery. This includes water chewing gum, mints and ice chips. You may brush your teeth and gargle the morning of your surgery, but do not swallow the water      You may be asked to stop blood thinners such as Coumadin, Plavix, Fragmin, Lovenox, etc., or any anti-inflammatories such as:  Aspirin, Ibuprofen, Advil, Naproxen prior to your surgery. We also ask that you stop any OTC medications such as fish oil, vitamin E, glucosamine, garlic, Multivitamins, COQ 10, etc.    We ask that you do not smoke 24 hours prior to surgery  We ask that you do not  drink any alcoholic beverages 24 hours prior to surgery     You must make arrangements for a responsible adult to take you home after your surgery. For your safety you will not be allowed to leave alone or drive yourself home. Your surgery will be cancelled if you do not have a ride home. Also for your safety, it is strongly suggested that someone stay with you the first 24 hours after your surgery. A parent or legal guardian must accompany a child scheduled for surgery and plan to stay at the hospital until the child is discharged. Please do not bring other children with you. For your comfort, please wear simple loose fitting clothing to the hospital.  Please do not bring valuables. Do not wear any make-up or nail polish on your fingers or toes      For your safety, please do not wear any jewelry or body piercing's on the day of surgery. All jewelry must be removed. If you have dentures, they will be removed before going to operating room. For your convenience, we will provide you with a container. If you wear contact lenses or glasses, they will be removed, please bring a case for them.      If

## 2017-11-15 ENCOUNTER — SURG/PROC ORDERS (OUTPATIENT)
Dept: ANESTHESIOLOGY | Age: 67
End: 2017-11-15

## 2017-11-15 RX ORDER — SODIUM CHLORIDE 0.9 % (FLUSH) 0.9 %
10 SYRINGE (ML) INJECTION EVERY 12 HOURS SCHEDULED
Status: CANCELLED | OUTPATIENT
Start: 2017-11-15

## 2017-11-15 RX ORDER — SODIUM CHLORIDE 9 MG/ML
INJECTION, SOLUTION INTRAVENOUS CONTINUOUS
Status: CANCELLED | OUTPATIENT
Start: 2017-11-15

## 2017-11-15 RX ORDER — SODIUM CHLORIDE 0.9 % (FLUSH) 0.9 %
10 SYRINGE (ML) INJECTION PRN
Status: CANCELLED | OUTPATIENT
Start: 2017-11-15

## 2017-11-15 RX ORDER — LISINOPRIL 10 MG/1
TABLET ORAL
Qty: 90 TABLET | Refills: 3 | Status: SHIPPED | OUTPATIENT
Start: 2017-11-15 | End: 2018-06-15 | Stop reason: ALTCHOICE

## 2017-11-16 ENCOUNTER — HOSPITAL ENCOUNTER (OUTPATIENT)
Dept: ENDOSCOPY | Age: 67
Discharge: HOME | End: 2017-11-16
Attending: INTERNAL MEDICINE | Admitting: INTERNAL MEDICINE

## 2017-11-16 VITALS
OXYGEN SATURATION: 97 % | HEART RATE: 102 BPM | SYSTOLIC BLOOD PRESSURE: 109 MMHG | RESPIRATION RATE: 18 BRPM | HEIGHT: 76 IN | WEIGHT: 247 LBS | BODY MASS INDEX: 30.08 KG/M2 | DIASTOLIC BLOOD PRESSURE: 70 MMHG | TEMPERATURE: 97.8 F

## 2017-11-16 LAB
GLUCOSE BLD-MCNC: 153 MG/DL (ref 70–99)
PERFORMED ON: ABNORMAL

## 2017-11-16 RX ORDER — ONDANSETRON 2 MG/ML
4 INJECTION INTRAMUSCULAR; INTRAVENOUS
Status: ACTIVE | OUTPATIENT
Start: 2017-11-16 | End: 2017-11-16

## 2017-11-16 RX ORDER — SODIUM CHLORIDE 9 MG/ML
INJECTION, SOLUTION INTRAVENOUS CONTINUOUS
Status: DISCONTINUED | OUTPATIENT
Start: 2017-11-16 | End: 2017-11-17 | Stop reason: HOSPADM

## 2017-11-16 RX ORDER — SODIUM CHLORIDE 0.9 % (FLUSH) 0.9 %
10 SYRINGE (ML) INJECTION EVERY 12 HOURS SCHEDULED
Status: DISCONTINUED | OUTPATIENT
Start: 2017-11-16 | End: 2017-11-17 | Stop reason: HOSPADM

## 2017-11-16 RX ORDER — SODIUM CHLORIDE 0.9 % (FLUSH) 0.9 %
10 SYRINGE (ML) INJECTION PRN
Status: DISCONTINUED | OUTPATIENT
Start: 2017-11-16 | End: 2017-11-17 | Stop reason: HOSPADM

## 2017-11-16 RX ADMIN — SODIUM CHLORIDE: 9 INJECTION, SOLUTION INTRAVENOUS at 12:41

## 2017-11-16 ASSESSMENT — PAIN SCALES - GENERAL
PAINLEVEL_OUTOF10: 0

## 2017-11-16 ASSESSMENT — ENCOUNTER SYMPTOMS: SHORTNESS OF BREATH: 0

## 2017-11-16 ASSESSMENT — PAIN - FUNCTIONAL ASSESSMENT: PAIN_FUNCTIONAL_ASSESSMENT: 0-10

## 2017-11-16 NOTE — PROCEDURES
[address] [place] [address] 16                                  PROCEDURE NOTE    PATIENT NAME: Samantha Chanel                      :        1950  MED REC NO:   [id number]                          ROOM:  ACCOUNT NO:   [de-identified]                          ADMIT DATE: 2017  PROVIDER:     Patricia Garcia MD    EGD NOTE    DATE OF PROCEDURE:  2017    REFERRING PROVIDER:  Darlin Vázquez MD    PATIENT HISTORY:  This is a 16-year-old male, outpatient. INSTRUMENT USED:  Olympus GIF-Q180. MEDICATIONS OF PROCEDURE:  The patient was premedicated with Diprivan  intravenously as administered by the anesthesiology service. INDICATIONS:  The patient has presented with guaiac-positive stool and  anemia. A recent colonoscopy was unrevealing. DESCRIPTION OF PROCEDURE:  The endoscope was inserted into the esophagus  without difficulty. The esophageal mucosa was entirely normal, revealing  no evidence of inflammatory or metaplastic change. The Z-line was located  at 40 cm. The stomach, duodenal bulb, and descending duodenum were all  normal.  Random small bowel biopsies were obtained to evaluate for celiac  disease. IMPRESSION:  1. Normal upper gastrointestinal endoscopy. 2.  Small bowel biopsies obtained. PLAN:  The patient will call the office for biopsy results and  recommendations.         Tata Perez MD    D: 2017 13:17:06       T: 2017 13:32:31     [id number]  Job#: [id number]     Doc#: [id number]    CC:  MD Darlin Witt MD

## 2017-11-16 NOTE — ANESTHESIA POST-OP
[facility] Department of Anesthesiology  Post-Anesthesia Note       Name:  Radha Ho                                         Age:  75 y.o.   MRN:  [id number]     Last Vitals & Oxygen Saturation: BP 97/64   Pulse 100   Temp 97.8 °F (36.6 °C) (Temporal)   Resp 18   Ht 6' 4\" (1.93 m)   Wt 247 lb (112 kg)   SpO2 97%   BMI 30.07 kg/m²   Patient Vitals for the past 4 hrs:   BP Temp Temp src Pulse Resp SpO2 Height Weight   11/16/17 1313 97/64 - - 100 18 97 % - -   11/16/17 1308 (!) 88/56 - - 95 18 95 % - -   11/16/17 1303 (!) 92/57 97.8 °F (36.6 °C) Temporal 93 18 93 % - -   11/16/17 1221 100/68 97.8 °F (36.6 °C) Temporal 97 18 98 % 6' 4\" (1.93 m) 247 lb (112 kg)       Level of consciousness: awake, alert and oriented    Respiratory: stable     Cardiovascular: stable     Hydration: stable     PONV: stable     Post-op pain: adequate analgesia    Post-op assessment: no apparent anesthetic complications    Complications:  none    Tawanna Camacho MD  November 16, 2017   1:22 PM

## 2017-11-16 NOTE — H&P
MM MISC USE ONCE DAILY WITH LEVEMIR FLEX  each 2    ACCU-CHEK BILLY strip TEST TWO TIMES A DAY 50 strip 2     No current facility-administered medications on file prior to encounter. Allergies:  Review of patient's allergies indicates no known allergies. Social History:      Social History     Social History    Marital status:      Spouse name: Lucy Velez Number of children: 2    Years of education: 25     Occupational History    retired      Social History Main Topics    Smoking status: Former Smoker     Packs/day: 1.00     Years: 13.00     Types: Cigarettes     Quit date: 7/20/1981    Smokeless tobacco: Never Used    Alcohol use Yes      Comment: WINE 3 X A MONTH    Drug use: No    Sexual activity: Not on file     Other Topics Concern    Not on file     Social History Narrative    No narrative on file           Family History:   Family History   Problem Relation Age of Onset    Cancer Mother      Cancer    Cancer Father      Bone cancer    Cancer Sister      Lung cancer         PHYSICAL EXAM:      /68   Pulse 97   Temp 97.8 °F (36.6 °C) (Temporal)   Resp 18   Ht 6' 4\" (1.93 m)   Wt 247 lb (112 kg)   SpO2 98%   BMI 30.07 kg/m²  I        Heart: Normal    Lungs: Normal    Abdomen: Normal      ASA Grade: ASA 3 - Patient with moderate systemic disease with functional limitations    III (soft palate, base of uvula visible)  ASSESSMENT AND PLAN:    1. Patient is a 75 y.o. male here for EGD  2. Procedure options, risks and benefits reviewed with patient who expresses understanding.

## 2017-11-16 NOTE — ANESTHESIA PRE-OP
Department of Anesthesiology  Preprocedure Note       Name:  Maty Brown   Age:  75 y.o.  :  1950                                          MRN:  [id number]         Date:  2017        [facility] Department of Anesthesiology  Pre-Anesthesia Evaluation/Consultation       Name:  Maty Brown                                         Age:  75 y.o.   MRN:  [id number]           Procedure (Scheduled):  EGD  Surgeon:  Dr. Askew Gray Court      No Known Allergies  Patient Active Problem List   Diagnosis    Skin ulcer of toe of left foot with fat layer exposed ([address]    Diabetes mellitus ([address]    BPH with obstruction/lower urinary tract symptoms    ED (erectile dysfunction)    Cerebellar infarct    Diabetic nephropathy with proteinuria ([address]    Diabetic peripheral neuropathy ([address]    Diabetes mellitus type 2, uncontrolled ([address]    Essential hypertension    Controlled type 2 diabetes mellitus with complication, with long-term current use of insulin (HCC)    Mixed hyperlipidemia    Toe osteomyelitis, left ([address]    Septic arthritis of interphalangeal joint of toe of left foot ([address]    Diabetic foot infection ([address]     Past Medical History:   Diagnosis Date    Arthritis     Cerebellar infarct ([address]     Cerebral artery occlusion with cerebral infarction ([address]       DIZZY  COULDN'T WALK   RESOLVED    Diabetes mellitus ([address]     Diabetic nephropathy with proteinuria (HCC)     Diabetic peripheral neuropathy (HCC)     Disease of blood and blood forming organ     Hyperlipidemia     Hypertension     Movement disorder      Past Surgical History:   Procedure Laterality Date    COLONOSCOPY      ENDOSCOPY, COLON, DIAGNOSTIC      FOOT SURGERY Right     Ulcer surgery    HYPOSPADIAS CORRECTION      OTHER SURGICAL HISTORY Left 2016    INCISION AND DRAINAGE LEFT HALLUX         PILONIDAL CYST EXCISION      TONSILLECTOMY       Social History   Substance Use Topics    Smoking status: Former Smoker Packs/day: 1.00     Years: 13.00     Types: Cigarettes     Quit date: 7/20/1981    Smokeless tobacco: Never Used    Alcohol use Yes      Comment: WINE 3 X A MONTH     Medications  Current Outpatient Prescriptions on File Prior to Encounter   Medication Sig Dispense Refill    lisinopril (PRINIVIL;ZESTRIL) 10 MG tablet TAKE ONE TABLET BY MOUTH DAILY 90 tablet 3    tamsulosin (FLOMAX) 0.4 MG capsule Take 1 capsule by mouth daily 30 capsule 3    LEVEMIR FLEXTOUCH 100 UNIT/ML injection pen INJECT 35 UNITS UNDER THE SKIN NIGHTLY 15 Pen 5    glipiZIDE (GLUCOTROL) 5 MG tablet TAKE TWO TABLETS BY MOUTH TWICE A  tablet 3    clopidogrel (PLAVIX) 75 MG tablet Take 1 tablet by mouth  daily 90 tablet 3    metFORMIN (GLUCOPHAGE) 850 MG tablet TAKE 1 TABLET TWICE A DAY  WITH MEALS 180 tablet 3    atorvastatin (LIPITOR) 10 MG tablet TAKE ONE TABLET BY MOUTH DAILY 90 tablet 3    ONETOUCH DELICA LANCETS 10G MISC USE TO TEST ONCE DAILY 100 each 5    glucose blood VI test strips (ONE TOUCH ULTRA TEST) strip Test daily. ICD-10:  E11.65  Diabetes Type II Uncontrolled 100 strip 3    B-D ULTRAFINE III SHORT PEN 31G X 8 MM MISC USE ONCE DAILY WITH LEVEMIR FLEX  each 2    ACCU-CHEK BILLY strip TEST TWO TIMES A DAY 50 strip 2     No current facility-administered medications on file prior to encounter.       Current Outpatient Prescriptions   Medication Sig Dispense Refill    lisinopril (PRINIVIL;ZESTRIL) 10 MG tablet TAKE ONE TABLET BY MOUTH DAILY 90 tablet 3    tamsulosin (FLOMAX) 0.4 MG capsule Take 1 capsule by mouth daily 30 capsule 3    LEVEMIR FLEXTOUCH 100 UNIT/ML injection pen INJECT 35 UNITS UNDER THE SKIN NIGHTLY 15 Pen 5    glipiZIDE (GLUCOTROL) 5 MG tablet TAKE TWO TABLETS BY MOUTH TWICE A  tablet 3    clopidogrel (PLAVIX) 75 MG tablet Take 1 tablet by mouth  daily 90 tablet 3    metFORMIN (GLUCOPHAGE) 850 MG tablet TAKE 1 TABLET TWICE A DAY  WITH MEALS 180 tablet 3    atorvastatin reviewed (including anesthesia, drug and allergy history). No interval changes to history and physical examination. Anesthetic plan, risks, benefits, alternatives, and personnel involved discussed with patient. Patient verbalized an understanding and agrees to proceed.       Yoly Roland MD  November 16, 2017  12:09 PM      Yoly Roland MD   11/16/2017

## 2017-11-30 ENCOUNTER — TELEPHONE (OUTPATIENT)
Dept: INTERNAL MEDICINE CLINIC | Age: 67
End: 2017-11-30

## 2017-12-01 ENCOUNTER — TELEPHONE (OUTPATIENT)
Dept: INTERNAL MEDICINE CLINIC | Age: 67
End: 2017-12-01

## 2017-12-01 NOTE — TELEPHONE ENCOUNTER
Pt is out of test strips. He uses One Touch Ultra, #100, tests bid.     [facility]

## 2017-12-02 ENCOUNTER — TELEPHONE (OUTPATIENT)
Dept: INTERNAL MEDICINE CLINIC | Age: 67
End: 2017-12-02

## 2018-01-05 PROBLEM — A41.9 SEPSIS (HCC): Status: ACTIVE | Noted: 2018-01-05

## 2018-01-05 PROBLEM — A41.9 SEPSIS DUE TO URINARY TRACT INFECTION (HCC): Status: ACTIVE | Noted: 2018-01-05

## 2018-01-05 PROBLEM — N39.0 SEPSIS DUE TO URINARY TRACT INFECTION (HCC): Status: ACTIVE | Noted: 2018-01-05

## 2018-01-05 PROBLEM — N39.0 UTI (URINARY TRACT INFECTION): Status: ACTIVE | Noted: 2018-01-05

## 2018-01-10 ENCOUNTER — OFFICE VISIT (OUTPATIENT)
Dept: INTERNAL MEDICINE CLINIC | Age: 68
End: 2018-01-10

## 2018-01-10 VITALS
TEMPERATURE: 98.4 F | DIASTOLIC BLOOD PRESSURE: 76 MMHG | BODY MASS INDEX: 28.85 KG/M2 | WEIGHT: 237 LBS | SYSTOLIC BLOOD PRESSURE: 138 MMHG | HEART RATE: 80 BPM | RESPIRATION RATE: 14 BRPM | OXYGEN SATURATION: 96 %

## 2018-01-10 DIAGNOSIS — Z79.4 CONTROLLED TYPE 2 DIABETES MELLITUS WITH COMPLICATION, WITH LONG-TERM CURRENT USE OF INSULIN (HCC): ICD-10-CM

## 2018-01-10 DIAGNOSIS — E11.42 DIABETIC PERIPHERAL NEUROPATHY (HCC): ICD-10-CM

## 2018-01-10 DIAGNOSIS — N30.00 ACUTE CYSTITIS WITHOUT HEMATURIA: Primary | ICD-10-CM

## 2018-01-10 DIAGNOSIS — E11.21 DIABETIC NEPHROPATHY WITH PROTEINURIA (HCC): ICD-10-CM

## 2018-01-10 DIAGNOSIS — M25.561 ACUTE PAIN OF RIGHT KNEE: ICD-10-CM

## 2018-01-10 DIAGNOSIS — N40.1 BPH WITH OBSTRUCTION/LOWER URINARY TRACT SYMPTOMS: ICD-10-CM

## 2018-01-10 DIAGNOSIS — E11.8 CONTROLLED TYPE 2 DIABETES MELLITUS WITH COMPLICATION, WITH LONG-TERM CURRENT USE OF INSULIN (HCC): ICD-10-CM

## 2018-01-10 DIAGNOSIS — N13.8 BPH WITH OBSTRUCTION/LOWER URINARY TRACT SYMPTOMS: ICD-10-CM

## 2018-01-10 DIAGNOSIS — D50.0 IRON DEFICIENCY ANEMIA DUE TO CHRONIC BLOOD LOSS: ICD-10-CM

## 2018-01-10 LAB
BILIRUBIN, POC: NORMAL
BLOOD URINE, POC: NORMAL
CLARITY, POC: NORMAL
COLOR, POC: YELLOW
GLUCOSE URINE, POC: NORMAL
KETONES, POC: NORMAL
LEUKOCYTE EST, POC: NORMAL
NITRITE, POC: POSITIVE
PH, POC: 5.5
PROTEIN, POC: NORMAL
SPECIFIC GRAVITY, POC: >=1.03
UROBILINOGEN, POC: 0.2

## 2018-01-10 PROCEDURE — 99214 OFFICE O/P EST MOD 30 MIN: CPT | Performed by: INTERNAL MEDICINE

## 2018-01-10 PROCEDURE — 1111F DSCHRG MED/CURRENT MED MERGE: CPT | Performed by: INTERNAL MEDICINE

## 2018-01-10 PROCEDURE — 81002 URINALYSIS NONAUTO W/O SCOPE: CPT | Performed by: INTERNAL MEDICINE

## 2018-01-10 RX ORDER — MELOXICAM 7.5 MG/1
7.5 TABLET ORAL DAILY
Qty: 14 TABLET | Refills: 3 | Status: SHIPPED | OUTPATIENT
Start: 2018-01-10 | End: 2018-02-19

## 2018-01-10 ASSESSMENT — ENCOUNTER SYMPTOMS
SPUTUM PRODUCTION: 0
SHORTNESS OF BREATH: 0
COUGH: 0

## 2018-01-10 NOTE — PROGRESS NOTES
Post-Discharge Transitional Care Management Services      Trey Martínez   YOB: 1950    Date of Visit:  1/10/2018    No Known Allergies  No outpatient prescriptions have been marked as taking for the 1/10/18 encounter (Office Visit) with Pam Tran MD.         There were no vitals filed for this visit. There is no height or weight on file to calculate BMI. Wt Readings from Last 3 Encounters:   01/05/18 237 lb 7 oz (107.7 kg)   11/16/17 247 lb (112 kg)   11/09/17 240 lb (108.9 kg)     BP Readings from Last 3 Encounters:   01/07/18 (!) 144/78   11/16/17 109/70   10/24/17 110/64        Patient was admitted to [facility] from 01/05/2018 to 01/07/2018 for UTI / Dizziness / Confusion. Inpatient course: Discharge summary reviewed- see chart. Current status: Danish Farmer is doing better. He really had worsening confusion in the hospital from his acute medical illness. He has baseline dementia but clearly had a delerium  from his UTI. That is slowly clearing. He has residual pain in his right knee from his fall prior to admission. He also has urinary frequency and nocturia. Review of Systems:  Review of Systems   Constitutional: Positive for malaise/fatigue. Negative for chills and fever. Respiratory: Negative for cough, sputum production and shortness of breath. Cardiovascular: Negative for chest pain, leg swelling and PND. Gastrointestinal:        Some anorexia. Genitourinary: Positive for frequency. Musculoskeletal:        Right knee pain. Skin: Negative for rash. Neurological: Positive for sensory change and weakness. Psychiatric/Behavioral: Positive for memory loss. Physical Exam:  Physical Exam   Constitutional: He appears well-developed and well-nourished. No distress. Eyes: EOM are normal. Pupils are equal, round, and reactive to light. Neck: No JVD present. Cardiovascular: Normal rate.     Pulmonary/Chest: Effort normal and breath sounds tablet  TAKE ONE TABLET BY MOUTH DAILY             meloxicam (MOBIC) 7.5 MG tablet  Take 1 tablet by mouth daily for 14 days             metFORMIN (GLUCOPHAGE) 850 MG tablet  TAKE 1 TABLET TWICE A DAY  WITH MEALS             ONETOUCH DELICA LANCETS 71B MISC  USE TO TEST ONCE DAILY             tamsulosin (FLOMAX) 0.4 MG capsule  Take 1 capsule by mouth daily                   Medications marked \"taking\" at this time  Outpatient Prescriptions Marked as Taking for the 1/10/18 encounter (Office Visit) with Hortencia Manuel MD   Medication Sig Dispense Refill    meloxicam (MOBIC) 7.5 MG tablet Take 1 tablet by mouth daily for 14 days 14 tablet 3    ciprofloxacin (CIPRO) 500 MG tablet Take 1 tablet by mouth 2 times daily for 7 days 14 tablet 0    glucose blood VI test strips (ONE TOUCH ULTRA TEST) strip USE TO TEST ONCE DAILY  E11.9 100 strip 3    lisinopril (PRINIVIL;ZESTRIL) 10 MG tablet TAKE ONE TABLET BY MOUTH DAILY 90 tablet 3    tamsulosin (FLOMAX) 0.4 MG capsule Take 1 capsule by mouth daily 30 capsule 3    LEVEMIR FLEXTOUCH 100 UNIT/ML injection pen INJECT 35 UNITS UNDER THE SKIN NIGHTLY (Patient taking differently: 30 Units nightly) 15 Pen 5    glipiZIDE (GLUCOTROL) 5 MG tablet TAKE TWO TABLETS BY MOUTH TWICE A  tablet 3    clopidogrel (PLAVIX) 75 MG tablet Take 1 tablet by mouth  daily 90 tablet 3    metFORMIN (GLUCOPHAGE) 850 MG tablet TAKE 1 TABLET TWICE A DAY  WITH MEALS 180 tablet 3    atorvastatin (LIPITOR) 10 MG tablet TAKE ONE TABLET BY MOUTH DAILY 90 tablet 3    ONETOUCH DELICA LANCETS 88N MISC USE TO TEST ONCE DAILY 100 each 5    B-D ULTRAFINE III SHORT PEN 31G X 8 MM MISC USE ONCE DAILY WITH LEVEMIR FLEX  each 2    ACCU-CHEK BILLY strip TEST TWO TIMES A DAY 50 strip 2        Medications patient taking as of now reconciled against medications ordered at time of hospital discharge : yes.     Vitals:    01/10/18 1449   BP: 138/76   Pulse: 80   Resp: 14   Temp: 98.4 °F (36.9 °C)

## 2018-01-16 PROBLEM — A41.9 SEPSIS (HCC): Status: RESOLVED | Noted: 2018-01-05 | Resolved: 2018-01-16

## 2018-01-17 ENCOUNTER — OFFICE VISIT (OUTPATIENT)
Dept: INTERNAL MEDICINE CLINIC | Age: 68
End: 2018-01-17

## 2018-01-17 VITALS
SYSTOLIC BLOOD PRESSURE: 148 MMHG | HEART RATE: 72 BPM | RESPIRATION RATE: 14 BRPM | OXYGEN SATURATION: 98 % | WEIGHT: 237 LBS | BODY MASS INDEX: 28.85 KG/M2 | DIASTOLIC BLOOD PRESSURE: 70 MMHG

## 2018-01-17 DIAGNOSIS — I63.9 CEREBELLAR INFARCT (HCC): ICD-10-CM

## 2018-01-17 DIAGNOSIS — S89.91XA INJURY OF RIGHT KNEE, INITIAL ENCOUNTER: ICD-10-CM

## 2018-01-17 DIAGNOSIS — F03.90 DEMENTIA WITHOUT BEHAVIORAL DISTURBANCE, UNSPECIFIED DEMENTIA TYPE: ICD-10-CM

## 2018-01-17 DIAGNOSIS — E11.8 CONTROLLED TYPE 2 DIABETES MELLITUS WITH COMPLICATION, WITH LONG-TERM CURRENT USE OF INSULIN (HCC): ICD-10-CM

## 2018-01-17 DIAGNOSIS — N30.00 ACUTE CYSTITIS WITHOUT HEMATURIA: Primary | ICD-10-CM

## 2018-01-17 DIAGNOSIS — Z79.4 CONTROLLED TYPE 2 DIABETES MELLITUS WITH COMPLICATION, WITH LONG-TERM CURRENT USE OF INSULIN (HCC): ICD-10-CM

## 2018-01-17 PROCEDURE — 3046F HEMOGLOBIN A1C LEVEL >9.0%: CPT | Performed by: INTERNAL MEDICINE

## 2018-01-17 PROCEDURE — 1111F DSCHRG MED/CURRENT MED MERGE: CPT | Performed by: INTERNAL MEDICINE

## 2018-01-17 PROCEDURE — G8427 DOCREV CUR MEDS BY ELIG CLIN: HCPCS | Performed by: INTERNAL MEDICINE

## 2018-01-17 PROCEDURE — 4040F PNEUMOC VAC/ADMIN/RCVD: CPT | Performed by: INTERNAL MEDICINE

## 2018-01-17 PROCEDURE — G8484 FLU IMMUNIZE NO ADMIN: HCPCS | Performed by: INTERNAL MEDICINE

## 2018-01-17 PROCEDURE — G8419 CALC BMI OUT NRM PARAM NOF/U: HCPCS | Performed by: INTERNAL MEDICINE

## 2018-01-17 PROCEDURE — G8598 ASA/ANTIPLAT THER USED: HCPCS | Performed by: INTERNAL MEDICINE

## 2018-01-17 PROCEDURE — 3017F COLORECTAL CA SCREEN DOC REV: CPT | Performed by: INTERNAL MEDICINE

## 2018-01-17 PROCEDURE — 1036F TOBACCO NON-USER: CPT | Performed by: INTERNAL MEDICINE

## 2018-01-17 PROCEDURE — 99214 OFFICE O/P EST MOD 30 MIN: CPT | Performed by: INTERNAL MEDICINE

## 2018-01-17 PROCEDURE — 1123F ACP DISCUSS/DSCN MKR DOCD: CPT | Performed by: INTERNAL MEDICINE

## 2018-01-17 ASSESSMENT — ENCOUNTER SYMPTOMS: RESPIRATORY NEGATIVE: 1

## 2018-01-17 NOTE — PROGRESS NOTES
Subjective:      Patient ID: Júnior Davis is a 75 y.o. male. [facility]  Bear Alfaro is here for follow-up of multiple problems:    UTI - has completed abx and symptoms resolved. His nocturia is actually better (now 2-3 times whereas prior it was 5-6 times). He is not using Tamsulosin because he had ? syncopal spell after first dose. He remains fatigued though and is napping more frequently. Diabetes mellitus - wel controlled. FSBS in AM sometimes as low as 70 mg%. He does not   Feel hypoglycemic. Right knee pain - related to fall before he was admitted. Still painful, slightly swollen with a reduced   ROM. Cognitive decline - probably back to his baseline following a worsening when in the hospital for the UTI. Also describing possible more left sided ha, left facial paresthesias since fall prior to hospital. (Has had some of this since his CVA in 2013). Review of Systems   Constitutional: Positive for activity change, appetite change and fatigue. Negative for chills and fever. Respiratory: Negative. Cardiovascular: Negative. Genitourinary: Positive for difficulty urinating and frequency. Musculoskeletal:        Right knee pain. Neurological: Positive for numbness and headaches. Psychiatric/Behavioral: Negative for dysphoric mood and sleep disturbance. 14 point ros otherwise negative. Objective:   Physical Exam   Constitutional: He appears well-developed and well-nourished. No distress. Eyes: EOM are normal. Pupils are equal, round, and reactive to light. Neck: No JVD present. Cardiovascular: Normal rate, regular rhythm and normal heart sounds. Pulmonary/Chest: Effort normal and breath sounds normal. No respiratory distress. He has no rales. Musculoskeletal: He exhibits no edema. Neurological: He is alert. No cranial nerve deficit. Skin: Skin is warm and dry. Assessment:      1. Acute cystitis without hematuria    2.  Controlled type 2 diabetes mellitus with

## 2018-01-18 ENCOUNTER — TELEPHONE (OUTPATIENT)
Dept: INTERNAL MEDICINE CLINIC | Age: 68
End: 2018-01-18

## 2018-01-18 DIAGNOSIS — R41.89 COGNITIVE CHANGE: ICD-10-CM

## 2018-01-18 DIAGNOSIS — F03.90 DEMENTIA WITHOUT BEHAVIORAL DISTURBANCE, UNSPECIFIED DEMENTIA TYPE: Primary | ICD-10-CM

## 2018-01-18 NOTE — TELEPHONE ENCOUNTER
Spoke with Darshan Meraz and informed that referral was sent to Dr Aubree Ivy office.  Sent last ov note and insurance cards

## 2018-01-22 ENCOUNTER — NURSE ONLY (OUTPATIENT)
Dept: INTERNAL MEDICINE CLINIC | Age: 68
End: 2018-01-22

## 2018-01-22 DIAGNOSIS — D50.0 IRON DEFICIENCY ANEMIA DUE TO CHRONIC BLOOD LOSS: ICD-10-CM

## 2018-01-22 LAB
CONTROL: NORMAL
HEMOCCULT STL QL: NEGATIVE

## 2018-01-22 PROCEDURE — 82274 ASSAY TEST FOR BLOOD FECAL: CPT | Performed by: INTERNAL MEDICINE

## 2018-01-23 ENCOUNTER — HOSPITAL ENCOUNTER (OUTPATIENT)
Dept: MRI IMAGING | Age: 68
Discharge: HOME | End: 2018-01-23
Admitting: INTERNAL MEDICINE

## 2018-01-23 DIAGNOSIS — S89.91XA INJURY OF RIGHT KNEE, INITIAL ENCOUNTER: ICD-10-CM

## 2018-01-23 DIAGNOSIS — S89.91XA INJURY OF RIGHT LOWER LEG: ICD-10-CM

## 2018-01-23 DIAGNOSIS — I63.9 CEREBELLAR INFARCT (HCC): ICD-10-CM

## 2018-02-05 ENCOUNTER — OFFICE VISIT (OUTPATIENT)
Dept: ORTHOPEDIC SURGERY | Age: 68
End: 2018-02-05

## 2018-02-05 VITALS
HEIGHT: 72 IN | WEIGHT: 237 LBS | HEART RATE: 75 BPM | BODY MASS INDEX: 32.1 KG/M2 | DIASTOLIC BLOOD PRESSURE: 62 MMHG | SYSTOLIC BLOOD PRESSURE: 106 MMHG

## 2018-02-05 DIAGNOSIS — M17.11 ARTHRITIS OF RIGHT KNEE: Primary | ICD-10-CM

## 2018-02-05 DIAGNOSIS — S80.01XA CONTUSION OF RIGHT KNEE, INITIAL ENCOUNTER: ICD-10-CM

## 2018-02-05 DIAGNOSIS — M24.561 FLEXION CONTRACTURE OF RIGHT KNEE: ICD-10-CM

## 2018-02-05 PROCEDURE — 1123F ACP DISCUSS/DSCN MKR DOCD: CPT | Performed by: ORTHOPAEDIC SURGERY

## 2018-02-05 PROCEDURE — G8484 FLU IMMUNIZE NO ADMIN: HCPCS | Performed by: ORTHOPAEDIC SURGERY

## 2018-02-05 PROCEDURE — G8417 CALC BMI ABV UP PARAM F/U: HCPCS | Performed by: ORTHOPAEDIC SURGERY

## 2018-02-05 PROCEDURE — G8427 DOCREV CUR MEDS BY ELIG CLIN: HCPCS | Performed by: ORTHOPAEDIC SURGERY

## 2018-02-05 PROCEDURE — 3017F COLORECTAL CA SCREEN DOC REV: CPT | Performed by: ORTHOPAEDIC SURGERY

## 2018-02-05 PROCEDURE — 20610 DRAIN/INJ JOINT/BURSA W/O US: CPT | Performed by: ORTHOPAEDIC SURGERY

## 2018-02-05 PROCEDURE — 1111F DSCHRG MED/CURRENT MED MERGE: CPT | Performed by: ORTHOPAEDIC SURGERY

## 2018-02-05 PROCEDURE — 1036F TOBACCO NON-USER: CPT | Performed by: ORTHOPAEDIC SURGERY

## 2018-02-05 PROCEDURE — 4040F PNEUMOC VAC/ADMIN/RCVD: CPT | Performed by: ORTHOPAEDIC SURGERY

## 2018-02-05 PROCEDURE — 99203 OFFICE O/P NEW LOW 30 MIN: CPT | Performed by: ORTHOPAEDIC SURGERY

## 2018-02-05 PROCEDURE — G8598 ASA/ANTIPLAT THER USED: HCPCS | Performed by: ORTHOPAEDIC SURGERY

## 2018-02-05 ASSESSMENT — ENCOUNTER SYMPTOMS
BACK PAIN: 0
EYES NEGATIVE: 1
GASTROINTESTINAL NEGATIVE: 1
RESPIRATORY NEGATIVE: 1

## 2018-02-05 NOTE — PROGRESS NOTES
Subjective:      Patient ID: Júnior Davis is a 70 y.o. male. HPI  Júnior Davis is seen today to request of Dr. Aquiles Florez for evaluation and treatment of a right knee injury. He was initially injured about 2 weeks ago. He fell when he was using the bathroom in the middle the night. He hit his knee against a wall. He has significant pain and felt that his knee was unsteady. He was seen in the ER. He said x-rays and an MRI. He says he feels great if he takes Tylenol. She doesn't he has pain can be 5 out of 10. He has been resting for the last couple of weeks. He has diabetes and high blood pressure. He denies major knee problems other than Osgood-Schlatter as a child. Review of Systems   Constitutional: Negative. HENT: Negative. Eyes: Negative. Respiratory: Negative. Cardiovascular: Negative. Gastrointestinal: Negative. Endocrine: Negative. Genitourinary: Negative. Musculoskeletal: Positive for arthralgias. Negative for back pain and neck pain. Skin: Negative. Allergic/Immunologic: Negative for food allergies. Neurological:        States he does have a bump on top of his head that will be bothersome sometimes. Hematological: Negative. Psychiatric/Behavioral: Negative. Objective:   Physical Exam  General Exam:    Vitals: Blood pressure 106/62, pulse 75, height 6' (1.829 m), weight 237 lb (107.5 kg). Constitutional: Patient is adequately groomed with no evidence of malnutrition  Mental Status: The patient is oriented to time, place and person. The patient's mood and affect are appropriate. Gait:  Patient walks a flexed knee, antalgic gait. Lymphatic: The lymphatic examination bilaterally reveals all areas to be without enlargement or induration. Vascular: Examination reveals no swelling or calf tenderness. Peripheral pulses are palpable and 2+. Neurological: The patient has good coordination. There is no weakness or sensory deficit.     Skin:

## 2018-02-05 NOTE — PROGRESS NOTES
XylocaineElenor Julissa: [id number]  Lot # [id number]  Exp 07/2021   [employer]  Site: right knee  Unit 4mL      Depo:  Ndc: [id number]  Lot # [id number]  Exp: 04/2020   pfizer  Site: right knee  Unit: 2mL

## 2018-02-06 ENCOUNTER — HOSPITAL ENCOUNTER (OUTPATIENT)
Dept: PHYSICAL THERAPY | Age: 68
Discharge: HOME | End: 2018-02-28
Attending: ORTHOPAEDIC SURGERY | Admitting: ORTHOPAEDIC SURGERY

## 2018-02-06 NOTE — PLAN OF CARE
118 [facility], [address] [place] [address], 122 [facility]     Phone: [phone]   Fax: [phone]                                                           Physical Therapy Certification    Dear Referring Practitioner: Inocencia Noe,    We had the pleasure of evaluating the following patient for physical therapy services at [address] [place] [address]. A summary of our findings can be found in the initial assessment below. This includes our plan of care. If you have any questions or concerns regarding these findings, please do not hesitate to contact me at the office phone number checked above. Thank you for the referral.       Physician Signature:_______________________________Date:__________________  By signing above (or electronic signature), therapists plan is approved by physician      Patient: Jenni Glez   : 1950   MRN: [id number]  Referring Physician: Referring Practitioner: Inocencia Noe      Evaluation Date: 2018      Medical Diagnosis Information:  Diagnosis: M24.561 (ICD-10-CM) - Flexion contracture of right knee; S80.01X (ICD-10-CM) - Contusion of right knee; M17.11 (ICD-10-CM) - Arthritis of right knee   Treatment Diagnosis: M24.561 (ICD-10-CM) - Flexion contracture of right knee; M25.561-right knee pain                                           Precautions/ Contra-indications: diabetes, h/o stroke in , OA  Latex Allergy:  [x]NO      []YES  Preferred Language for Healthcare:   [x]English       []other:    SUBJECTIVE: Patient stated complaint:  Pt reports that in the middle of the night he ran in to the bathroom in the middle of the night. He remembers being on the floor. His wife heard him fall. She called for an ambulance. He went to [facility].  For 2 days, he thought he was at [facility]. He is not sure why he couldn't remember things. His knee pain after the fall. This happened on 2018.   Soon after this he had MRI on his knee. Relevant Medical History:  H/o stroke  Functional Disability Index:  WOMAC    Pain Scale: 0/10  Easing factors: injection yesterday, which really helped. Pain at best 0/10. He does take Tylenol for pain. Ice. Provocative factors: 5/10 at worst.  Walking causes pain, stairs, squatting     Type: []Constant   [x]Intermittent  []Radiating []Localized []other:     Numbness/Tingling: none    Functional Limitations/Impairments: []Sitting []Standing [x]Walking    [x]Squatting/bending  [x]Stairs           []ADL's  []Transfers []Sports/Recreation []Other:    Occupation/School: retired     Living Status/Prior Level of Function: Independent with ADLs and IADLs, and pain free. He enjoys working in the yard especially in the fall and spring.  (insert highest prior level of function)    OBJECTIVE:     Joint mobility:    []Normal    [x]Hypo   []Hyper    Palpation: TTP over patellar tendon inferior pole    Functional Mobility/Transfers: see above    Posture: forward head    Bandages/Dressings/Incisions: bandage from injection yesterday was removed today    Gait: (include devices/WB status) decreased knee motion with gt        Flexibility L R Comment   Hamstring      Gastroc      ITB      Quad                ROM PROM AROM Overpressure Comment    L R L R L R    Flexion   128 106      Extension   0 lacking 16                              Strength L R Comment   Quad 5 5 In available range   Hamstring 5 5    Gastroc      Hip flexor 4 4    Hip ABD 4+ 4                  Orthopedic Special Tests:   Special Test Results/Comment   Meniscal Click    Crepitus    Flexion Test    Valgus Laxity    Varus Laxity    Lachmans    Drop Back    Homans            Girth L R   Mid Patella     Suprapatellar     5cm above     15cm above                              [x] Patient history, allergies, meds reviewed. Medical chart reviewed. See intake form.      Review Of Systems (ROS):  [x]Performed Review of systems (Integumentary, CardioPulmonary, Neurological) by intake and observation. Intake form has been scanned into medical record. Patient has been instructed to contact their primary care physician regarding ROS issues if not already being addressed at this time. Co-morbidities/Complexities (which will affect course of rehabilitation):   []None           Arthritic conditions   []Rheumatoid arthritis (M05.9)  []Osteoarthritis (M19.91)   Cardiovascular conditions   []Hypertension (I10)  []Hyperlipidemia (E78.5)  []Angina pectoris (I20)  []Atherosclerosis (I70)   Musculoskeletal conditions   []Disc pathology   []Congenital spine pathologies   []Prior surgical intervention  []Osteoporosis (M81.8)  []Osteopenia (M85.8)   Endocrine conditions   []Hypothyroid (E03.9)  []Hyperthyroid Gastrointestinal conditions   []Constipation (E28.10)   Metabolic conditions   []Morbid obesity (E66.01)  [x]Diabetes type 1(E10.65) or 2 (E11.65)   []Neuropathy (G60.9)     Pulmonary conditions   []Asthma (J45)  []Coughing   []COPD (J44.9)   Psychological Disorders  []Anxiety (F41.9)  []Depression (F32.9)   []Other:   [x]Other:   H/o stroke       Barriers to/and or personal factors that will affect rehab potential:              []Age  []Sex              []Motivation/Lack of Motivation                        []Co-Morbidities              []Cognitive Function, education/learning barriers              []Environmental, home barriers              []profession/work barriers  []past PT/medical experience  []other:  Justification: pt does have difficulty remembering some directions    Falls Risk Assessment (30 days):   [] Falls Risk assessed and no intervention required. [] Falls Risk assessed and Patient requires intervention due to being higher risk   TUG score (>12s at risk):     [x] Falls education provided, including  Pt did fall causing this injury. He is being seen for this currently.         G-Codes:  PT G-Codes  Functional Assessment Tool Used: WOMAC  Score: 25%  Functional Limitation: Mobility: Walking and moving around  Mobility: Walking and Moving Around Current Status (): At least 20 percent but less than 40 percent impaired, limited or restricted  Mobility: Walking and Moving Around Goal Status (): 0 percent impaired, limited or restricted    ASSESSMENT:   Functional Impairments:     []Noted lumbar/proximal hip/LE joint hypomobility   [x]Decreased LE functional ROM   []Decreased core/proximal hip strength and neuromuscular control   []Decreased LE functional strength   [x]Reduced balance/proprioceptive control   []other:      Functional Activity Limitations (from functional questionnaire and intake)   [x]Reduced ability to tolerate prolonged functional positions   [x]Reduced ability or difficulty with changes of positions or transfers between positions   []Reduced ability to maintain good posture and demonstrate good body mechanics with sitting, bending, and lifting   []Reduced ability to sleep   [] Reduced ability or tolerance with driving and/or computer work   []Reduced ability to perform lifting, carrying tasks   [x]Reduced ability to squat   []Reduced ability to forward bend   [x]Reduced ability to ambulate prolonged functional periods/distances/surfaces   [x]Reduced ability to ascend/descend stairs   []Reduced ability to run, hop, cut or jump   []other:    Participation Restrictions   []Reduced participation in self care activities   []Reduced participation in home management activities   [x]Reduced participation in work activities   [x]Reduced participation in social activities. []Reduced participation in sport/recreation activities. Classification :    []Signs/symptoms consistent with post-surgical status including decreased ROM, strength and function.    []Signs/symptoms consistent with joint sprain/strain   []Signs/symptoms consistent with patella-femoral syndrome   []Signs/symptoms consistent with knee OA/hip

## 2018-02-08 ENCOUNTER — HOSPITAL ENCOUNTER (OUTPATIENT)
Dept: PHYSICAL THERAPY | Age: 68
Discharge: HOME | End: 2018-02-09
Admitting: ORTHOPAEDIC SURGERY

## 2018-02-08 NOTE — FLOWSHEET NOTE
[address], [facility] [address] EDIE FELIZ                                                         Physical Therapy Daily Treatment Note  Date:  2018    Patient Name:  Lissett Mayen    :  1950  MRN: [id number]    Medical/Treatment Diagnosis Information:  · Diagnosis: M24.561 (ICD-10-CM) - Flexion contracture of right knee; S80.01X (ICD-10-CM) - Contusion of right knee; M17.11 (ICD-10-CM) - Arthritis of right knee  · Treatment Diagnosis: M24.561 (ICD-10-CM) - Flexion contracture of right knee; M25.561-right knee pain. [id number]  Insurance/Certification information:  PT Insurance Information: Medicare and [address]  Physician Information:  Referring Practitioner: Clare Salgado of care signed (Y/N):     Date of Patient follow up with Physician: prn after 5 Mar 2018    G-Code (if applicable):      Date G-Code Applied:  2018    PT G-Codes  Functional Assessment Tool Used: WOMAC  Score: 25%  Functional Limitation: Mobility: Walking and moving around  Mobility: Walking and Moving Around Current Status (): At least 20 percent but less than 40 percent impaired, limited or restricted  Mobility: Walking and Moving Around Goal Status (): 0 percent impaired, limited or restricted    Progress Note: [x]  Yes  []  No  Next due by: Visit #10  Or 6 Mar 2018     Latex Allergy:  [x]NO      []YES  Preferred Language for Healthcare:   [x]English       []other:    Visit # Insurance Allowable   2 $2010     Pain level:  2/10     SUBJECTIVE:  He is enjoying his HEP. He has no questions regarding it. He is doing it on the floor without problems.       OBJECTIVE: 2018   Observation:   Test measurements:      Flexibility L R Comment   Hamstring      Gastroc      ITB      Quad                ROM PROM AROM Overpressure Comment    L R L R L R    Flexion  111 sheet pulls, 108 ERMI        Extension  lacking 10 on ERMI and after hanging weights                                Strength L R Comment   Quad      Hamstring      Gastroc      Hip flexor      Hip ABD                      Special Test Results/Comment   Meniscal Click    Crepitus    Flexion Test    Valgus Laxity    Varus Laxity    Lachmans    Drop Back    Homans            Girth L R   Mid Patella     Suprapatellar     5cm above     15cm above       Jm Clinical Decision Rule for DVT    Clinical Presentation  Possible Score  Clients Score    Active cancer (within 6 months of diagnosis or receiving palliative care)  1     Paralysis, paresis, or recent immobilization of lower extremity  1     Bedridden for more than 3 days or major surgery in the last 4 weeks  1     Localized tenderness in the center of the posterior calf, the popliteal space, or along the femoral vein in the anterior thigh/groin  1     Entire lower extremity swelling  1     Unilateral calf swelling (more than 3 cm larger than uninvolved side)  1     Unilateral pitting edema  1     Collateral superficial veins (nonvaricose)  1     An alternative diagnosis is as likely (or more likely) than DVT (e.g., cellulitis, postoperative swelling, calf strain)  -2     Total Points   -2     Key  · -2 to 0 Low probability of DVT 3% (95% confidence interval [CI] 1.7%5.9%)  · 1 to 2 Moderate probability of DVT 17% (95% confidence interval [CI] 12%23%)  · 3 or more High probability of DVT 75% (95% confidence interval [CI] 63%84%)    Medical consultation is advised in the presence of low probability  Medical referral is required with moderate or high score. Jm PS, Link DR, Kofi J, et al: Value of assessment of pretest probability of deep-vein thrombosis in clinical management, Lancet 313:7360-8464, 1997. RESTRICTIONS/PRECAUTIONS: H/O stroke.   DM    Exercises/Interventions:     Exercise/Equipment Resistance/Repetitions Other comments   Stretching     Hamstring 5x:30 supine   Hip Flexion     ITB     Geoffrey Inclined Calf 5x:30    Towel Pull 5x:30 With prop and hamstring stretch. SLR     Supine 3x10    Prone     Abduction 10x    Adducton     SLR+     Clams                    Isometrics     Quad sets 10x:10    Hip Adduction 10x:10    Hamstring                    Patellar Glides     Medial     Superior     Inferior          ROM     Sheet Pulls 10x:10    Wall Slides     Edge of bed     Flexionator 10x:10    Extensionator 10x:10    Hang Weights     Ankle Pumps                              CKC     Calf raises     Wall sits     Step ups     Step up and over     Lateral Step Downs               Mini squats     CC TKE          Lateral band walks     Side Planks     Half moon     Single leg flow          Biodex-balance     Single leg stance     Plyoback          Stool scoots     SB bridge     SB HS Curls     Planks          PRE     Extension  RANGE: 90/40   Flexion  RANGE: 0/90        Cable Column          Leg Press  RANGE: 70/10        Bike 8' ROM    Treadmill                     Therapeutic Exercise and NMR EXR  [x] (46821) Provided verbal/tactile cueing for activities related to strengthening, flexibility, endurance, ROM for improvements in LE, proximal hip, and core control with self care, mobility, lifting, ambulation.  [] (17433) Provided verbal/tactile cueing for activities related to improving balance, coordination, kinesthetic sense, posture, motor skill, proprioception  to assist with LE, proximal hip, and core control in self care, mobility, lifting, ambulation and eccentric single leg control.      NMR and Therapeutic Activities:    [x] (49677 or 32514) Provided verbal/tactile cueing for activities related to improving balance, coordination, kinesthetic sense, posture, motor skill, proprioception and motor activation to allow for proper function of core, proximal hip and LE with self care and ADLs  [] (26539) Gait Re-education- Provided training and instruction to the patient for proper LE, core and proximal hip recruitment and positioning and eccentric body weight control with ambulation re-education including up and down stairs     Home Exercise Program:    [x] (17169) Reviewed/Progressed HEP activities related to strengthening, flexibility, endurance, ROM of core, proximal hip and LE for functional self-care, mobility, lifting and ambulation/stair navigation   [] (98477)Reviewed/Progressed HEP activities related to improving balance, coordination, kinesthetic sense, posture, motor skill, proprioception of core, proximal hip and LE for self care, mobility, lifting, and ambulation/stair navigation      Manual Treatments:  PROM / STM / Oscillations-Mobs:  G-I, II, III, IV (PA's, Inf., Post.)  [] (42091) Provided manual therapy to mobilize LE, proximal hip and/or LS spine soft tissue/joints for the purpose of modulating pain, promoting relaxation,  increasing ROM, reducing/eliminating soft tissue swelling/inflammation/restriction, improving soft tissue extensibility and allowing for proper ROM for normal function with self care, mobility, lifting and ambulation. Other:  Patient education on PT and plan of care including diagnosis, prognosis, treatment goals and options. Patient agrees with discussed POC and treatment and is aware of rehab process. Pt was also educated on clinic layout and use of modalities. PT gave pt business card to call if any questions. Modalities:  Ice-15' propped with 7# weight (needed to rest the last 4')    Charges:   Timed Code Treatment Minutes: 10'   Total Treatment Minutes: [de-identified]'       [] EVAL (LOW) 455 1011   [] EVAL (MOD) 29461   [] EVAL (HIGH) 58811   [] RE-EVAL    [x] UQ(38570) x  1   [] IONTO  [] NMR (19279) x      [] VASO  [] Manual (23669) x       [x] Other: gp  [] TA x       [] Mech Traction (43524)  [] ES(attended) (78663)      [] ES (un) (03320):       GOALS:  Patient stated goal: no pain    Therapist goals for Patient:   Short Term Goals: To be achieved in: 2 weeks  1.  Pt

## 2018-02-13 ENCOUNTER — HOSPITAL ENCOUNTER (OUTPATIENT)
Dept: PHYSICAL THERAPY | Age: 68
Discharge: HOME | End: 2018-02-14
Admitting: ORTHOPAEDIC SURGERY

## 2018-02-13 NOTE — FLOWSHEET NOTE
Bri Rowland, [facility] DR EDIE FELIZ                                                         Physical Therapy Daily Treatment Note  Date:  2018    Patient Name:  Jena Rivera    :  1950  MRN: [id number]    Medical/Treatment Diagnosis Information:  · Diagnosis: M24.561 (ICD-10-CM) - Flexion contracture of right knee; S80.01X (ICD-10-CM) - Contusion of right knee; M17.11 (ICD-10-CM) - Arthritis of right knee  · Treatment Diagnosis: M24.561 (ICD-10-CM) - Flexion contracture of right knee; M25.561-right knee pain. Onset-4497  Insurance/Certification information:  PT Insurance Information: Medicare and Kalli Richard  Physician Information:  Referring Practitioner: Nick Case of care signed (Y/N):     Date of Patient follow up with Physician: prn after 5 Mar 2018    G-Code (if applicable):      Date G-Code Applied:  2018    PT G-Codes  Functional Assessment Tool Used: WOMAC  Score: 25%  Functional Limitation: Mobility: Walking and moving around  Mobility: Walking and Moving Around Current Status (): At least 20 percent but less than 40 percent impaired, limited or restricted  Mobility: Walking and Moving Around Goal Status (): 0 percent impaired, limited or restricted    Progress Note: [x]  Yes  []  No  Next due by: Visit #10  Or 6 Mar 2018     Latex Allergy:  [x]NO      []YES  Preferred Language for Healthcare:   [x]English       []other:    Visit # Insurance Allowable   3 $2010     Pain level:  4-5/10     SUBJECTIVE:  He is having a bit more pain today. He is not sure why. The pain woke him up around 4-5. He took some Tylenol, which helped. His pain is a lot better now compared to earlier today.        OBJECTIVE: 2018   Observation:   Test measurements:      Flexibility L R Comment   Hamstring      Gastroc      ITB      Quad                ROM PROM AROM Overpressure Comment    L R L R L R    Flexion ADLs  [] (68141) Gait Re-education- Provided training and instruction to the patient for proper LE, core and proximal hip recruitment and positioning and eccentric body weight control with ambulation re-education including up and down stairs     Home Exercise Program:    [x] (56476) Reviewed/Progressed HEP activities related to strengthening, flexibility, endurance, ROM of core, proximal hip and LE for functional self-care, mobility, lifting and ambulation/stair navigation   [] (01910)Reviewed/Progressed HEP activities related to improving balance, coordination, kinesthetic sense, posture, motor skill, proprioception of core, proximal hip and LE for self care, mobility, lifting, and ambulation/stair navigation      Manual Treatments:  PROM / STM / Oscillations-Mobs:  G-I, II, III, IV (PA's, Inf., Post.)  [x] (31593) Provided manual therapy to mobilize LE, proximal hip and/or LS spine soft tissue/joints for the purpose of modulating pain, promoting relaxation,  increasing ROM, reducing/eliminating soft tissue swelling/inflammation/restriction, improving soft tissue extensibility and allowing for proper ROM for normal function with self care, mobility, lifting and ambulation. 8' patellar mobs    Other:      Modalities:  Ice-15' propped with 7.5# weight (needed to rest the last 9')     Charges:  Timed Code Treatment Minutes: 39'   Total Treatment Minutes: 110'       [] EVAL (LOW) 455 1011   [] EVAL (MOD) 26815   [] EVAL (HIGH) 79184   [] RE-EVAL    [x] DW(74438) x  1   [] IONTO  [] NMR (46382) x      [] VASO  [x] Manual (32975) x  1    [x] Other: gp  [x] TA x  1    [] Mech Traction (98454)  [] ES(attended) (97421)      [] ES (un) (47725):       GOALS:  Patient stated goal: no pain    Therapist goals for Patient:   Short Term Goals: To be achieved in: 2 weeks  1. Pt will be independent HEP and progression per patient tolerance, in order to prevent re-injury.    2. Patient will have a decrease in pain at worst of 5/10 to facilitate improvement in movement, function, and ADLs as indicated by functional deficits. Long Term Goals: To be achieved in: 8 weeks  1. Pt will demo a WOMAC score of 10% or better to assist with reaching prior level of function. 2. Patient will demonstrate increased AROM to knee flexion to greater than or equal to 120 and knee ext to 0 to allow for proper joint functioning as indicated by patients functional deficits. 3. Patient will demonstrate an increase in strength of hip flex, ABD, and knee flex/ext to 4+/5 to allow for proper functional mobility as indicated by patients functional deficits. 4. Patient will return to amb up/down stairs with reciprocal gt without c/o pain. 5. Pt will report pain at worst less than or 2/10. Progression Towards Functional goals:  [] Patient is progressing as expected towards functional goals listed. [] Progression is slowed due to complexities listed. [] Progression has been slowed due to co-morbidities. [x] Plan just implemented, too soon to assess goals progression  [] Other:     ASSESSMENT:      Treatment/Activity Tolerance:  [x] Patient tolerated treatment well [] Patient limited by fatigue  [] Patient limited by pain  [] Patient limited by other medical complications  [] Other:  Pt gale tx well. However, he was sore and tired at the end of tx. He needed to stop the hanging of weights at the end. He does demo questionable comprehension of his HEP. He reports that he is doing his HEP once a day. He was encouraged to do the ROM exercises at least twice a day as this is his biggest limitation. Prognosis: [x] Good [] Fair  [] Poor    Patient Requires Follow-up: [x] Yes  [] No    PLAN: Assess pt's compliance to HEP.     [x] Continue per plan of care [] Alter current plan (see comments)  [] Plan of care initiated [] Hold pending MD visit [] Discharge    Electronically signed by: Kenisha Henderson DPT [id number]

## 2018-02-15 ENCOUNTER — HOSPITAL ENCOUNTER (OUTPATIENT)
Dept: PHYSICAL THERAPY | Age: 68
Discharge: HOME | End: 2018-02-16
Admitting: ORTHOPAEDIC SURGERY

## 2018-02-15 NOTE — FLOWSHEET NOTE
improvements in LE, proximal hip, and core control with self care, mobility, lifting, ambulation.  [] (78955) Provided verbal/tactile cueing for activities related to improving balance, coordination, kinesthetic sense, posture, motor skill, proprioception  to assist with LE, proximal hip, and core control in self care, mobility, lifting, ambulation and eccentric single leg control. NMR and Therapeutic Activities:    [x] (31310 or 22420) Provided verbal/tactile cueing for activities related to improving balance, coordination, kinesthetic sense, posture, motor skill, proprioception and motor activation to allow for proper function of core, proximal hip and LE with self care and ADLs  [] (55270) Gait Re-education- Provided training and instruction to the patient for proper LE, core and proximal hip recruitment and positioning and eccentric body weight control with ambulation re-education including up and down stairs     Home Exercise Program:    [x] (63296) Reviewed/Progressed HEP activities related to strengthening, flexibility, endurance, ROM of core, proximal hip and LE for functional self-care, mobility, lifting and ambulation/stair navigation   [] (85704)Reviewed/Progressed HEP activities related to improving balance, coordination, kinesthetic sense, posture, motor skill, proprioception of core, proximal hip and LE for self care, mobility, lifting, and ambulation/stair navigation      Manual Treatments:  PROM / STM / Oscillations-Mobs:  G-I, II, III, IV (PA's, Inf., Post.)  [x] (21992) Provided manual therapy to mobilize LE, proximal hip and/or LS spine soft tissue/joints for the purpose of modulating pain, promoting relaxation,  increasing ROM, reducing/eliminating soft tissue swelling/inflammation/restriction, improving soft tissue extensibility and allowing for proper ROM for normal function with self care, mobility, lifting and ambulation.    8' patellar mobs    Other:      Modalities:  Ice-15' propped with Pt gale tx better today compared to previously this week. He does demo continued stiffness in his knee despite reporting that he feels better. He would continue to benefit from skilled PT to address deficits, but it needs to be mindful of his tolerance. Prognosis: [x] Good [] Fair  [] Poor    Patient Requires Follow-up: [x] Yes  [] No    PLAN: Assess pt's compliance to HEP.     [x] Continue per plan of care [] Alter current plan (see comments)  [] Plan of care initiated [] Hold pending MD visit [] Discharge    Electronically signed by: Lamar Payton, HENOK [id number]

## 2018-02-19 ENCOUNTER — OFFICE VISIT (OUTPATIENT)
Dept: INTERNAL MEDICINE CLINIC | Age: 68
End: 2018-02-19

## 2018-02-19 VITALS
HEIGHT: 72 IN | RESPIRATION RATE: 16 BRPM | WEIGHT: 242 LBS | DIASTOLIC BLOOD PRESSURE: 72 MMHG | BODY MASS INDEX: 32.78 KG/M2 | OXYGEN SATURATION: 98 % | HEART RATE: 74 BPM | SYSTOLIC BLOOD PRESSURE: 134 MMHG

## 2018-02-19 DIAGNOSIS — N39.0 URINARY TRACT INFECTION WITHOUT HEMATURIA, SITE UNSPECIFIED: Primary | ICD-10-CM

## 2018-02-19 DIAGNOSIS — R41.89 COGNITIVE IMPAIRMENT: ICD-10-CM

## 2018-02-19 DIAGNOSIS — D50.0 IRON DEFICIENCY ANEMIA DUE TO CHRONIC BLOOD LOSS: ICD-10-CM

## 2018-02-19 DIAGNOSIS — Z79.4 CONTROLLED TYPE 2 DIABETES MELLITUS WITH COMPLICATION, WITH LONG-TERM CURRENT USE OF INSULIN (HCC): ICD-10-CM

## 2018-02-19 DIAGNOSIS — M17.11 PRIMARY OSTEOARTHRITIS OF RIGHT KNEE: ICD-10-CM

## 2018-02-19 DIAGNOSIS — E11.8 CONTROLLED TYPE 2 DIABETES MELLITUS WITH COMPLICATION, WITH LONG-TERM CURRENT USE OF INSULIN (HCC): ICD-10-CM

## 2018-02-19 LAB
BASOPHILS ABSOLUTE: 0 K/UL (ref 0–0.2)
BASOPHILS RELATIVE PERCENT: 0.4 %
EOSINOPHILS ABSOLUTE: 0.1 K/UL (ref 0–0.6)
EOSINOPHILS RELATIVE PERCENT: 0.6 %
HBA1C MFR BLD: 7 %
HCT VFR BLD CALC: 37.3 % (ref 40.5–52.5)
HEMOGLOBIN: 12.7 G/DL (ref 13.5–17.5)
LYMPHOCYTES ABSOLUTE: 1.4 K/UL (ref 1–5.1)
LYMPHOCYTES RELATIVE PERCENT: 14.1 %
MCH RBC QN AUTO: 31 PG (ref 26–34)
MCHC RBC AUTO-ENTMCNC: 34.1 G/DL (ref 31–36)
MCV RBC AUTO: 91 FL (ref 80–100)
MONOCYTES ABSOLUTE: 0.7 K/UL (ref 0–1.3)
MONOCYTES RELATIVE PERCENT: 6.7 %
NEUTROPHILS ABSOLUTE: 7.8 K/UL (ref 1.7–7.7)
NEUTROPHILS RELATIVE PERCENT: 78.2 %
PDW BLD-RTO: 13.9 % (ref 12.4–15.4)
PLATELET # BLD: 339 K/UL (ref 135–450)
PMV BLD AUTO: 8.7 FL (ref 5–10.5)
RBC # BLD: 4.09 M/UL (ref 4.2–5.9)
WBC # BLD: 10 K/UL (ref 4–11)

## 2018-02-19 PROCEDURE — 3017F COLORECTAL CA SCREEN DOC REV: CPT | Performed by: INTERNAL MEDICINE

## 2018-02-19 PROCEDURE — 1036F TOBACCO NON-USER: CPT | Performed by: INTERNAL MEDICINE

## 2018-02-19 PROCEDURE — 1123F ACP DISCUSS/DSCN MKR DOCD: CPT | Performed by: INTERNAL MEDICINE

## 2018-02-19 PROCEDURE — G8484 FLU IMMUNIZE NO ADMIN: HCPCS | Performed by: INTERNAL MEDICINE

## 2018-02-19 PROCEDURE — G8427 DOCREV CUR MEDS BY ELIG CLIN: HCPCS | Performed by: INTERNAL MEDICINE

## 2018-02-19 PROCEDURE — G8417 CALC BMI ABV UP PARAM F/U: HCPCS | Performed by: INTERNAL MEDICINE

## 2018-02-19 PROCEDURE — 3045F PR MOST RECENT HEMOGLOBIN A1C LEVEL 7.0-9.0%: CPT | Performed by: INTERNAL MEDICINE

## 2018-02-19 PROCEDURE — 99214 OFFICE O/P EST MOD 30 MIN: CPT | Performed by: INTERNAL MEDICINE

## 2018-02-19 PROCEDURE — 4040F PNEUMOC VAC/ADMIN/RCVD: CPT | Performed by: INTERNAL MEDICINE

## 2018-02-19 PROCEDURE — 83036 HEMOGLOBIN GLYCOSYLATED A1C: CPT | Performed by: INTERNAL MEDICINE

## 2018-02-19 PROCEDURE — G8598 ASA/ANTIPLAT THER USED: HCPCS | Performed by: INTERNAL MEDICINE

## 2018-02-19 PROCEDURE — 36415 COLL VENOUS BLD VENIPUNCTURE: CPT | Performed by: INTERNAL MEDICINE

## 2018-02-19 ASSESSMENT — ENCOUNTER SYMPTOMS
GASTROINTESTINAL NEGATIVE: 1
RESPIRATORY NEGATIVE: 1

## 2018-02-20 ENCOUNTER — HOSPITAL ENCOUNTER (OUTPATIENT)
Dept: PHYSICAL THERAPY | Age: 68
Discharge: HOME | End: 2018-02-21
Admitting: ORTHOPAEDIC SURGERY

## 2018-02-20 NOTE — FLOWSHEET NOTE
lacking 9 on  Flex It  lacking 11 after stretches                              Strength L R Comment   Quad      Hamstring      Gastroc      Hip flexor      Hip ABD                      Special Test Results/Comment   Meniscal Click    Crepitus    Flexion Test    Valgus Laxity    Varus Laxity    Lachmans    Drop Back    Homans            Girth L R   Mid Patella     Suprapatellar     5cm above     15cm above           RESTRICTIONS/PRECAUTIONS: H/O stroke. DM    Exercises/Interventions:     Exercise/Equipment Resistance/Repetitions Other comments   Stretching     Hamstring 5x:30 supine   Hip Flexion     ITB     Grion     Quad     Inclined Calf 5x:30    Towel Pull 5x:30 With prop and hamstring stretch.         SLR     Supine 3x10    Prone     Abduction 10x    Adducton     SLR+     Clams                    Isometrics     Quad sets 10x:10    Hip Adduction     Hamstring                    Patellar Glides     Medial     Superior     Inferior          ROM     Sheet Pulls 10x:10    Wall Slides 10x:10    Edge of bed     Flexionator 10x:20    Extensionator     Hang Weights     Ankle Pumps     Flex it extension 5x1'                        CKC     Calf raises     Wall sits     Step ups     Step up and over     Lateral Step Downs               Mini squats     CC TKE          Lateral band walks     Side Planks     Half moon     Single leg flow          Biodex-balance     Single leg stance     Plyoback          Stool scoots     SB bridge     SB HS Curls     Planks          PRE     Extension  RANGE: 90/40   Flexion  RANGE: 0/90        Cable Column          Leg Press  RANGE: 70/10        Bike 8' ROM    Treadmill                     Therapeutic Exercise and NMR EXR  [x] (18032) Provided verbal/tactile cueing for activities related to strengthening, flexibility, endurance, ROM for improvements in LE, proximal hip, and core control with self care, mobility, lifting, ambulation.  [] (76114) Provided verbal/tactile cueing for activities

## 2018-02-22 ENCOUNTER — HOSPITAL ENCOUNTER (OUTPATIENT)
Dept: PHYSICAL THERAPY | Age: 68
Discharge: HOME | End: 2018-02-23
Admitting: ORTHOPAEDIC SURGERY

## 2018-02-22 NOTE — FLOWSHEET NOTE
improving balance, coordination, kinesthetic sense, posture, motor skill, proprioception  to assist with LE, proximal hip, and core control in self care, mobility, lifting, ambulation and eccentric single leg control. NMR and Therapeutic Activities:    [x] (77164 or 65979) Provided verbal/tactile cueing for activities related to improving balance, coordination, kinesthetic sense, posture, motor skill, proprioception and motor activation to allow for proper function of core, proximal hip and LE with self care and ADLs  [] (33812) Gait Re-education- Provided training and instruction to the patient for proper LE, core and proximal hip recruitment and positioning and eccentric body weight control with ambulation re-education including up and down stairs     Home Exercise Program:    [x] (12996) Reviewed/Progressed HEP activities related to strengthening, flexibility, endurance, ROM of core, proximal hip and LE for functional self-care, mobility, lifting and ambulation/stair navigation   [] (64351)Reviewed/Progressed HEP activities related to improving balance, coordination, kinesthetic sense, posture, motor skill, proprioception of core, proximal hip and LE for self care, mobility, lifting, and ambulation/stair navigation      Manual Treatments:  PROM / STM / Oscillations-Mobs:  G-I, II, III, IV (PA's, Inf., Post.)  [x] (14796) Provided manual therapy to mobilize LE, proximal hip and/or LS spine soft tissue/joints for the purpose of modulating pain, promoting relaxation,  increasing ROM, reducing/eliminating soft tissue swelling/inflammation/restriction, improving soft tissue extensibility and allowing for proper ROM for normal function with self care, mobility, lifting and ambulation.    8' patellar mobs     Other:      Modalities:  Ice-10' propped with 4# weight     Charges:   Timed Code Treatment Minutes: 30'   Total Treatment Minutes: 80'       [] EVAL (LOW) 455 1011   [] EVAL (MOD) 87844   [] EVAL (HIGH) 255 5761   []

## 2018-02-27 ENCOUNTER — HOSPITAL ENCOUNTER (OUTPATIENT)
Dept: PHYSICAL THERAPY | Age: 68
Discharge: HOME | End: 2018-02-28
Admitting: ORTHOPAEDIC SURGERY

## 2018-02-27 NOTE — FLOWSHEET NOTE
9 after stretches                              Strength L R Comment   Quad      Hamstring      Gastroc      Hip flexor      Hip ABD                      Special Test Results/Comment   Meniscal Click    Crepitus    Flexion Test    Valgus Laxity    Varus Laxity    Lachmans    Drop Back    Homans            Girth L R   Mid Patella     Suprapatellar     5cm above     15cm above           RESTRICTIONS/PRECAUTIONS: H/O stroke. DM    Exercises/Interventions:     Exercise/Equipment Resistance/Repetitions Other comments   Stretching     Hamstring 5x:30 supine   Hip Flexion     ITB     Grion     Quad     Inclined Calf 5x:30    Towel Pull 5x:30 With prop and hamstring stretch.         SLR     Supine 4x10 1#    Prone     Abduction 3x10 1#    Adducton 3x10 1#    SLR+     Clams                    Isometrics     Quad sets     Hip Adduction     Hamstring                    Patellar Glides     Medial     Superior     Inferior          ROM     Sheet Pulls     Wall Slides 10x:10    Edge of bed     Flexionator     Extensionator     Hang Weights     Ankle Pumps     Flex it extension 5x1'                        CKC     Calf raises 3x10    Wall sits     Step ups     Step up and over     Lateral Step Downs               Mini squats     CC TKE          Lateral band walks     Side Planks     Half moon     Single leg flow          Biodex-balance     Single leg stance     Plyoback          Stool scoots     SB bridge     SB HS Curls     Planks          PRE     Extension  RANGE: 90/40   Flexion  RANGE: 0/90        Cable Column          Leg Press  RANGE: 70/10        Bike 8' ROM    Treadmill                     Therapeutic Exercise and NMR EXR  [x] (06591) Provided verbal/tactile cueing for activities related to strengthening, flexibility, endurance, ROM for improvements in LE, proximal hip, and core control with self care, mobility, lifting, ambulation.  [] (28950) Provided verbal/tactile cueing for activities related to improving balance, coordination, kinesthetic sense, posture, motor skill, proprioception  to assist with LE, proximal hip, and core control in self care, mobility, lifting, ambulation and eccentric single leg control. NMR and Therapeutic Activities:    [x] (79239 or 99427) Provided verbal/tactile cueing for activities related to improving balance, coordination, kinesthetic sense, posture, motor skill, proprioception and motor activation to allow for proper function of core, proximal hip and LE with self care and ADLs  [] (62190) Gait Re-education- Provided training and instruction to the patient for proper LE, core and proximal hip recruitment and positioning and eccentric body weight control with ambulation re-education including up and down stairs     Home Exercise Program:    [x] (73727) Reviewed/Progressed HEP activities related to strengthening, flexibility, endurance, ROM of core, proximal hip and LE for functional self-care, mobility, lifting and ambulation/stair navigation   [] (24772)Reviewed/Progressed HEP activities related to improving balance, coordination, kinesthetic sense, posture, motor skill, proprioception of core, proximal hip and LE for self care, mobility, lifting, and ambulation/stair navigation      Manual Treatments:  PROM / STM / Oscillations-Mobs:  G-I, II, III, IV (PA's, Inf., Post.)  [x] (57647) Provided manual therapy to mobilize LE, proximal hip and/or LS spine soft tissue/joints for the purpose of modulating pain, promoting relaxation,  increasing ROM, reducing/eliminating soft tissue swelling/inflammation/restriction, improving soft tissue extensibility and allowing for proper ROM for normal function with self care, mobility, lifting and ambulation.    8' patellar mobs     Other:      Modalities:  Ice-15' propped with 4# weight     Charges:   Timed Code Treatment Minutes: 30'   Total Treatment Minutes: 80'       [] EVAL (LOW) 455 1011   [] EVAL (MOD) 69874   [] EVAL (HIGH) 35070   [] RE-EVAL    [x] possible that it may have been. He gale progressions well. He continues to require mod/max cuing for proper form. He will be out of town all next week. Prognosis: [x] Good [] Fair  [] Poor    Patient Requires Follow-up: [x] Yes  [] No    PLAN: Assess pt's compliance to HEP. Consider more extension based exercises.    [x] Continue per plan of care [] Alter current plan (see comments)  [] Plan of care initiated [] Hold pending MD visit [] Discharge    Electronically signed by: Rajesh Hoffmann, DPT [id number]

## 2018-03-01 ENCOUNTER — HOSPITAL ENCOUNTER (OUTPATIENT)
Dept: OTHER | Age: 68
Discharge: HOME | End: 2018-03-31
Attending: ORTHOPAEDIC SURGERY | Admitting: ORTHOPAEDIC SURGERY

## 2018-03-07 ENCOUNTER — TELEPHONE (OUTPATIENT)
Dept: INTERNAL MEDICINE CLINIC | Age: 68
End: 2018-03-07

## 2018-03-20 ENCOUNTER — HOSPITAL ENCOUNTER (OUTPATIENT)
Dept: PHYSICAL THERAPY | Age: 68
Discharge: HOME | End: 2018-03-21
Admitting: ORTHOPAEDIC SURGERY

## 2018-03-20 NOTE — PLAN OF CARE
achieved in: 2 weeks  1. Pt will be independent HEP and progression per patient tolerance, in order to prevent re-injury. -met  2. Patient will have a decrease in pain at worst of 5/10 to facilitate improvement in movement, function, and ADLs as indicated by functional deficits. -met (4/10 at worst)    Long Term Goals: To be achieved in: 8 weeks  1. Pt will demo a WOMAC score of 10% or better to assist with reaching prior level of function. -ongoing  2. Patient will demonstrate increased AROM to knee flexion to greater than or equal to 120 and knee ext to 0 to allow for proper joint functioning as indicated by patients functional deficits. -ongoing  3. Patient will demonstrate an increase in strength of hip flex, ABD, and knee flex/ext to 4+/5 to allow for proper functional mobility as indicated by patients functional deficits. -partially met  4. Patient will return to amb up/down stairs with reciprocal gt without c/o pain.-met    5. Pt will report pain at worst less than or 2/10. -ongoing    Progression Towards Functional goals:  [] Patient is progressing as expected towards functional goals listed. [] Progression is slowed due to complexities listed. [] Progression has been slowed due to co-morbidities. [x] Plan just implemented, too soon to assess goals progression  [] Other:     ASSESSMENT:      Treatment/Activity Tolerance:  [x] Patient tolerated treatment well [x] Patient limited by fatigue  [] Patient limited by pain  [] Patient limited by other medical complications  [] Other:  Pt gale tx well. He continues to lack full knee ext despite pushing it rigorously early on, but I am unsure if this was his base level before the fall. He states he is feeling much better overall. He did just return from a trip. He does report less pain. He demo improved compliance with HEP. He will continue with skilled PT 1x/wk in order to promote strength and return to PLOF. He will continue his HEP as well.

## 2018-03-28 RX ORDER — LANCETS 33 GAUGE
EACH MISCELLANEOUS
Qty: 100 EACH | Refills: 4 | Status: SHIPPED | OUTPATIENT
Start: 2018-03-28

## 2018-03-30 ENCOUNTER — OFFICE VISIT (OUTPATIENT)
Dept: ORTHOPEDIC SURGERY | Age: 68
End: 2018-03-30

## 2018-03-30 VITALS
HEART RATE: 100 BPM | HEIGHT: 72 IN | BODY MASS INDEX: 32.1 KG/M2 | WEIGHT: 237 LBS | DIASTOLIC BLOOD PRESSURE: 59 MMHG | SYSTOLIC BLOOD PRESSURE: 91 MMHG

## 2018-03-30 DIAGNOSIS — M17.11 ARTHRITIS OF RIGHT KNEE: Primary | ICD-10-CM

## 2018-03-30 PROCEDURE — 1123F ACP DISCUSS/DSCN MKR DOCD: CPT | Performed by: ORTHOPAEDIC SURGERY

## 2018-03-30 PROCEDURE — G8427 DOCREV CUR MEDS BY ELIG CLIN: HCPCS | Performed by: ORTHOPAEDIC SURGERY

## 2018-03-30 PROCEDURE — 1036F TOBACCO NON-USER: CPT | Performed by: ORTHOPAEDIC SURGERY

## 2018-03-30 PROCEDURE — 3017F COLORECTAL CA SCREEN DOC REV: CPT | Performed by: ORTHOPAEDIC SURGERY

## 2018-03-30 PROCEDURE — 99212 OFFICE O/P EST SF 10 MIN: CPT | Performed by: ORTHOPAEDIC SURGERY

## 2018-03-30 PROCEDURE — G8598 ASA/ANTIPLAT THER USED: HCPCS | Performed by: ORTHOPAEDIC SURGERY

## 2018-03-30 PROCEDURE — G8482 FLU IMMUNIZE ORDER/ADMIN: HCPCS | Performed by: ORTHOPAEDIC SURGERY

## 2018-03-30 PROCEDURE — 4040F PNEUMOC VAC/ADMIN/RCVD: CPT | Performed by: ORTHOPAEDIC SURGERY

## 2018-03-30 PROCEDURE — G8417 CALC BMI ABV UP PARAM F/U: HCPCS | Performed by: ORTHOPAEDIC SURGERY

## 2018-03-30 NOTE — PROGRESS NOTES
Júnior Davis falls of his right knee. Overall he is doing really well. He is dramatically improved from where he was at his index visit about 8 weeks ago. He still gets some pain at night when his Tylenol wears off. He typically takes 1 before bed at about 10 PM in about 4 AM will have some discomfort. He has been diligent in his exercise program.    He says his pain at night is about 2 out of 10. Patient's medications, allergies, past medical, surgical, social and family histories were reviewed and updated as appropriate. Relevant review of systems reviewed. General Exam:    Vitals: Blood pressure (!) 91/59, pulse 100, height 6' (1.829 m), weight 237 lb (107.5 kg). Constitutional: Patient is adequately groomed with no evidence of malnutrition  Mental Status: The patient is oriented to time, place and person. The patient's mood and affect are appropriate. He walks with normal gait. Right knee shows no pain over the medial or lateral joint line. There is mild to moderate crepitation. He has tiny flexion contracture of about 1 or 2°. Stable. He has no drainable effusion. Calf is soft without DVT. Assessment: Doing well with conservative management for right knee arthritis. Plan: I think he should look into using an extended release Tylenol at night. He can remain active. We can repeat an injection in a month or so if necessary. He'll continue with his exercises. Follow up with me on an as-needed basis. This note was created using voice recognition software. It has been proofread, but occasionally errors remain. Please disregard these errors. They will be corrected as they are noted.

## 2018-04-01 ENCOUNTER — HOSPITAL ENCOUNTER (OUTPATIENT)
Dept: OTHER | Age: 68
Discharge: HOME | End: 2018-04-30
Attending: ORTHOPAEDIC SURGERY | Admitting: ORTHOPAEDIC SURGERY

## 2018-04-11 PROBLEM — N39.0 UTI (URINARY TRACT INFECTION): Status: RESOLVED | Noted: 2018-01-05 | Resolved: 2018-04-11

## 2018-05-01 ENCOUNTER — OFFICE VISIT (OUTPATIENT)
Dept: INTERNAL MEDICINE CLINIC | Age: 68
End: 2018-05-01

## 2018-05-01 VITALS
SYSTOLIC BLOOD PRESSURE: 110 MMHG | OXYGEN SATURATION: 97 % | BODY MASS INDEX: 32.78 KG/M2 | TEMPERATURE: 98.5 F | DIASTOLIC BLOOD PRESSURE: 72 MMHG | RESPIRATION RATE: 16 BRPM | WEIGHT: 242 LBS | HEART RATE: 100 BPM | HEIGHT: 72 IN

## 2018-05-01 DIAGNOSIS — N40.1 BPH WITH OBSTRUCTION/LOWER URINARY TRACT SYMPTOMS: ICD-10-CM

## 2018-05-01 DIAGNOSIS — N13.8 BPH WITH OBSTRUCTION/LOWER URINARY TRACT SYMPTOMS: ICD-10-CM

## 2018-05-01 DIAGNOSIS — I47.1 SVT (SUPRAVENTRICULAR TACHYCARDIA) (HCC): ICD-10-CM

## 2018-05-01 DIAGNOSIS — G44.89 OTHER HEADACHE SYNDROME: ICD-10-CM

## 2018-05-01 DIAGNOSIS — E11.8 CONTROLLED TYPE 2 DIABETES MELLITUS WITH COMPLICATION, WITH LONG-TERM CURRENT USE OF INSULIN (HCC): ICD-10-CM

## 2018-05-01 DIAGNOSIS — Z79.4 CONTROLLED TYPE 2 DIABETES MELLITUS WITH COMPLICATION, WITH LONG-TERM CURRENT USE OF INSULIN (HCC): ICD-10-CM

## 2018-05-01 DIAGNOSIS — R00.0 RAPID HEARTBEAT: Primary | ICD-10-CM

## 2018-05-01 DIAGNOSIS — R41.3 MEMORY IMPAIRMENT: ICD-10-CM

## 2018-05-01 PROCEDURE — G8417 CALC BMI ABV UP PARAM F/U: HCPCS | Performed by: INTERNAL MEDICINE

## 2018-05-01 PROCEDURE — 3017F COLORECTAL CA SCREEN DOC REV: CPT | Performed by: INTERNAL MEDICINE

## 2018-05-01 PROCEDURE — 99214 OFFICE O/P EST MOD 30 MIN: CPT | Performed by: INTERNAL MEDICINE

## 2018-05-01 PROCEDURE — 2022F DILAT RTA XM EVC RTNOPTHY: CPT | Performed by: INTERNAL MEDICINE

## 2018-05-01 PROCEDURE — G8598 ASA/ANTIPLAT THER USED: HCPCS | Performed by: INTERNAL MEDICINE

## 2018-05-01 PROCEDURE — 93000 ELECTROCARDIOGRAM COMPLETE: CPT | Performed by: INTERNAL MEDICINE

## 2018-05-01 PROCEDURE — 4040F PNEUMOC VAC/ADMIN/RCVD: CPT | Performed by: INTERNAL MEDICINE

## 2018-05-01 PROCEDURE — 3045F PR MOST RECENT HEMOGLOBIN A1C LEVEL 7.0-9.0%: CPT | Performed by: INTERNAL MEDICINE

## 2018-05-01 PROCEDURE — 1123F ACP DISCUSS/DSCN MKR DOCD: CPT | Performed by: INTERNAL MEDICINE

## 2018-05-01 PROCEDURE — 1036F TOBACCO NON-USER: CPT | Performed by: INTERNAL MEDICINE

## 2018-05-01 PROCEDURE — G8427 DOCREV CUR MEDS BY ELIG CLIN: HCPCS | Performed by: INTERNAL MEDICINE

## 2018-05-01 RX ORDER — ATORVASTATIN CALCIUM 10 MG/1
TABLET, FILM COATED ORAL
Qty: 90 TABLET | Refills: 3 | Status: SHIPPED | OUTPATIENT
Start: 2018-05-01 | End: 2019-04-19 | Stop reason: SDUPTHER

## 2018-05-01 ASSESSMENT — ENCOUNTER SYMPTOMS
GASTROINTESTINAL NEGATIVE: 1
RESPIRATORY NEGATIVE: 1

## 2018-05-02 ENCOUNTER — TELEPHONE (OUTPATIENT)
Dept: INTERNAL MEDICINE CLINIC | Age: 68
End: 2018-05-02

## 2018-05-02 PROBLEM — I47.1 SVT (SUPRAVENTRICULAR TACHYCARDIA) (HCC): Status: ACTIVE | Noted: 2018-05-02

## 2018-05-07 ENCOUNTER — OFFICE VISIT (OUTPATIENT)
Dept: CARDIOLOGY CLINIC | Age: 68
End: 2018-05-07

## 2018-05-07 VITALS
HEIGHT: 72 IN | DIASTOLIC BLOOD PRESSURE: 58 MMHG | SYSTOLIC BLOOD PRESSURE: 100 MMHG | HEART RATE: 148 BPM | OXYGEN SATURATION: 97 % | WEIGHT: 247 LBS | BODY MASS INDEX: 33.46 KG/M2

## 2018-05-07 DIAGNOSIS — E78.2 MIXED HYPERLIPIDEMIA: ICD-10-CM

## 2018-05-07 DIAGNOSIS — I48.3 TYPICAL ATRIAL FLUTTER (HCC): Primary | ICD-10-CM

## 2018-05-07 DIAGNOSIS — I10 ESSENTIAL HYPERTENSION: ICD-10-CM

## 2018-05-07 PROCEDURE — 3017F COLORECTAL CA SCREEN DOC REV: CPT | Performed by: INTERNAL MEDICINE

## 2018-05-07 PROCEDURE — 1036F TOBACCO NON-USER: CPT | Performed by: INTERNAL MEDICINE

## 2018-05-07 PROCEDURE — G8417 CALC BMI ABV UP PARAM F/U: HCPCS | Performed by: INTERNAL MEDICINE

## 2018-05-07 PROCEDURE — 1123F ACP DISCUSS/DSCN MKR DOCD: CPT | Performed by: INTERNAL MEDICINE

## 2018-05-07 PROCEDURE — 93000 ELECTROCARDIOGRAM COMPLETE: CPT | Performed by: INTERNAL MEDICINE

## 2018-05-07 PROCEDURE — 4040F PNEUMOC VAC/ADMIN/RCVD: CPT | Performed by: INTERNAL MEDICINE

## 2018-05-07 PROCEDURE — G8598 ASA/ANTIPLAT THER USED: HCPCS | Performed by: INTERNAL MEDICINE

## 2018-05-07 PROCEDURE — 99205 OFFICE O/P NEW HI 60 MIN: CPT | Performed by: INTERNAL MEDICINE

## 2018-05-07 PROCEDURE — G8427 DOCREV CUR MEDS BY ELIG CLIN: HCPCS | Performed by: INTERNAL MEDICINE

## 2018-05-07 RX ORDER — DONEPEZIL HYDROCHLORIDE 5 MG/1
5 TABLET, FILM COATED ORAL NIGHTLY
Qty: 30 TABLET | Refills: 2 | Status: SHIPPED | OUTPATIENT
Start: 2018-05-07 | End: 2018-05-30

## 2018-05-07 RX ORDER — METOPROLOL TARTRATE 50 MG/1
50 TABLET, FILM COATED ORAL 2 TIMES DAILY
Qty: 60 TABLET | Refills: 3 | Status: SHIPPED | OUTPATIENT
Start: 2018-05-07 | End: 2018-05-30

## 2018-05-10 ENCOUNTER — TELEPHONE (OUTPATIENT)
Dept: CARDIOLOGY CLINIC | Age: 68
End: 2018-05-10

## 2018-05-22 ENCOUNTER — OFFICE VISIT (OUTPATIENT)
Dept: INTERNAL MEDICINE CLINIC | Age: 68
End: 2018-05-22

## 2018-05-22 VITALS
DIASTOLIC BLOOD PRESSURE: 87 MMHG | OXYGEN SATURATION: 98 % | WEIGHT: 248 LBS | BODY MASS INDEX: 33.59 KG/M2 | SYSTOLIC BLOOD PRESSURE: 119 MMHG | HEIGHT: 72 IN | HEART RATE: 136 BPM | RESPIRATION RATE: 18 BRPM

## 2018-05-22 DIAGNOSIS — I48.92 ATRIAL FLUTTER WITH RAPID VENTRICULAR RESPONSE (HCC): ICD-10-CM

## 2018-05-22 DIAGNOSIS — I50.31 ACUTE CONGESTIVE HEART FAILURE WITH LEFT VENTRICULAR DIASTOLIC DYSFUNCTION (HCC): Primary | ICD-10-CM

## 2018-05-22 DIAGNOSIS — E11.8 CONTROLLED TYPE 2 DIABETES MELLITUS WITH COMPLICATION, WITH LONG-TERM CURRENT USE OF INSULIN (HCC): ICD-10-CM

## 2018-05-22 DIAGNOSIS — Z79.4 CONTROLLED TYPE 2 DIABETES MELLITUS WITH COMPLICATION, WITH LONG-TERM CURRENT USE OF INSULIN (HCC): ICD-10-CM

## 2018-05-22 PROBLEM — I50.43 CHF (CONGESTIVE HEART FAILURE), NYHA CLASS I, ACUTE ON CHRONIC, COMBINED (HCC): Status: ACTIVE | Noted: 2018-05-22

## 2018-05-22 PROCEDURE — G8417 CALC BMI ABV UP PARAM F/U: HCPCS | Performed by: INTERNAL MEDICINE

## 2018-05-22 PROCEDURE — 4040F PNEUMOC VAC/ADMIN/RCVD: CPT | Performed by: INTERNAL MEDICINE

## 2018-05-22 PROCEDURE — 99214 OFFICE O/P EST MOD 30 MIN: CPT | Performed by: INTERNAL MEDICINE

## 2018-05-22 PROCEDURE — 1123F ACP DISCUSS/DSCN MKR DOCD: CPT | Performed by: INTERNAL MEDICINE

## 2018-05-22 PROCEDURE — 3017F COLORECTAL CA SCREEN DOC REV: CPT | Performed by: INTERNAL MEDICINE

## 2018-05-22 PROCEDURE — 3045F PR MOST RECENT HEMOGLOBIN A1C LEVEL 7.0-9.0%: CPT | Performed by: INTERNAL MEDICINE

## 2018-05-22 PROCEDURE — 1036F TOBACCO NON-USER: CPT | Performed by: INTERNAL MEDICINE

## 2018-05-22 PROCEDURE — G8427 DOCREV CUR MEDS BY ELIG CLIN: HCPCS | Performed by: INTERNAL MEDICINE

## 2018-05-22 PROCEDURE — 2022F DILAT RTA XM EVC RTNOPTHY: CPT | Performed by: INTERNAL MEDICINE

## 2018-05-22 PROCEDURE — G8598 ASA/ANTIPLAT THER USED: HCPCS | Performed by: INTERNAL MEDICINE

## 2018-05-22 ASSESSMENT — ENCOUNTER SYMPTOMS
COUGH: 0
GASTROINTESTINAL NEGATIVE: 1
SHORTNESS OF BREATH: 1
CHEST TIGHTNESS: 0
WHEEZING: 0

## 2018-05-23 ENCOUNTER — TELEPHONE (OUTPATIENT)
Dept: INTERNAL MEDICINE CLINIC | Age: 68
End: 2018-05-23

## 2018-05-23 PROBLEM — I50.31 ACUTE DIASTOLIC CHF (CONGESTIVE HEART FAILURE) (HCC): Status: ACTIVE | Noted: 2018-05-23

## 2018-05-23 PROBLEM — G30.9 ALZHEIMER'S DISEASE (HCC): Status: ACTIVE | Noted: 2018-05-23

## 2018-05-23 PROBLEM — F02.80 ALZHEIMER'S DISEASE (HCC): Status: ACTIVE | Noted: 2018-05-23

## 2018-05-23 PROBLEM — E83.42 HYPOMAGNESEMIA: Status: ACTIVE | Noted: 2018-05-23

## 2018-05-23 PROBLEM — I50.43 CHF (CONGESTIVE HEART FAILURE), NYHA CLASS I, ACUTE ON CHRONIC, COMBINED (HCC): Status: RESOLVED | Noted: 2018-05-22 | Resolved: 2018-05-23

## 2018-05-24 ENCOUNTER — TELEPHONE (OUTPATIENT)
Dept: INTERNAL MEDICINE CLINIC | Age: 68
End: 2018-05-24

## 2018-05-25 ENCOUNTER — TELEPHONE (OUTPATIENT)
Dept: INTERNAL MEDICINE CLINIC | Age: 68
End: 2018-05-25

## 2018-05-25 PROBLEM — I50.31 ACUTE DIASTOLIC CHF (CONGESTIVE HEART FAILURE) (HCC): Status: RESOLVED | Noted: 2018-05-23 | Resolved: 2018-05-25

## 2018-05-25 PROBLEM — E11.621 DIABETIC ULCER OF LEFT FOOT ASSOCIATED WITH TYPE 2 DIABETES MELLITUS (HCC): Status: ACTIVE | Noted: 2018-05-25

## 2018-05-25 PROBLEM — L97.529 DIABETIC ULCER OF LEFT FOOT ASSOCIATED WITH TYPE 2 DIABETES MELLITUS (HCC): Status: ACTIVE | Noted: 2018-05-25

## 2018-05-25 PROBLEM — I50.41 ACUTE COMBINED SYSTOLIC AND DIASTOLIC HEART FAILURE (HCC): Status: ACTIVE | Noted: 2018-05-25

## 2018-05-27 ENCOUNTER — CARE COORDINATION (OUTPATIENT)
Dept: CASE MANAGEMENT | Age: 68
End: 2018-05-27

## 2018-05-27 DIAGNOSIS — E11.21 DIABETIC NEPHROPATHY WITH PROTEINURIA (HCC): Primary | ICD-10-CM

## 2018-05-27 PROCEDURE — 1111F DSCHRG MED/CURRENT MED MERGE: CPT | Performed by: INTERNAL MEDICINE

## 2018-05-29 ENCOUNTER — CARE COORDINATION (OUTPATIENT)
Dept: CASE MANAGEMENT | Age: 68
End: 2018-05-29

## 2018-05-29 ENCOUNTER — TELEPHONE (OUTPATIENT)
Dept: CARDIOLOGY CLINIC | Age: 68
End: 2018-05-29

## 2018-05-30 ENCOUNTER — OFFICE VISIT (OUTPATIENT)
Dept: INTERNAL MEDICINE CLINIC | Age: 68
End: 2018-05-30

## 2018-05-30 VITALS
WEIGHT: 229 LBS | BODY MASS INDEX: 27.89 KG/M2 | HEIGHT: 76 IN | RESPIRATION RATE: 16 BRPM | HEART RATE: 80 BPM | OXYGEN SATURATION: 95 % | DIASTOLIC BLOOD PRESSURE: 56 MMHG | SYSTOLIC BLOOD PRESSURE: 95 MMHG

## 2018-05-30 DIAGNOSIS — Z98.890 S/P ABLATION OF ACCESSORY BYPASS TRACT: ICD-10-CM

## 2018-05-30 DIAGNOSIS — I50.41 ACUTE COMBINED SYSTOLIC AND DIASTOLIC HEART FAILURE (HCC): ICD-10-CM

## 2018-05-30 DIAGNOSIS — I48.92 ATRIAL FLUTTER WITH RAPID VENTRICULAR RESPONSE (HCC): Primary | ICD-10-CM

## 2018-05-30 LAB — HBA1C MFR BLD: 6.7 %

## 2018-05-30 PROCEDURE — 99495 TRANSJ CARE MGMT MOD F2F 14D: CPT | Performed by: INTERNAL MEDICINE

## 2018-05-30 PROCEDURE — 83036 HEMOGLOBIN GLYCOSYLATED A1C: CPT | Performed by: INTERNAL MEDICINE

## 2018-05-30 PROCEDURE — 1111F DSCHRG MED/CURRENT MED MERGE: CPT | Performed by: INTERNAL MEDICINE

## 2018-05-31 LAB
CREATININE URINE: 167.3 MG/DL (ref 39–259)
MICROALBUMIN UR-MCNC: <1.2 MG/DL
MICROALBUMIN/CREAT UR-RTO: NORMAL MG/G (ref 0–30)

## 2018-06-04 ENCOUNTER — CARE COORDINATION (OUTPATIENT)
Dept: CASE MANAGEMENT | Age: 68
End: 2018-06-04

## 2018-06-08 ENCOUNTER — CARE COORDINATION (OUTPATIENT)
Dept: CASE MANAGEMENT | Age: 68
End: 2018-06-08

## 2018-06-08 ENCOUNTER — TELEPHONE (OUTPATIENT)
Dept: CARDIOLOGY CLINIC | Age: 68
End: 2018-06-08

## 2018-06-15 ENCOUNTER — TELEPHONE (OUTPATIENT)
Dept: CARDIOLOGY CLINIC | Age: 68
End: 2018-06-15

## 2018-06-18 RX ORDER — GLIPIZIDE 5 MG/1
TABLET ORAL
Qty: 360 TABLET | Refills: 3 | Status: SHIPPED | OUTPATIENT
Start: 2018-06-18 | End: 2019-08-23 | Stop reason: SDUPTHER

## 2018-06-19 ENCOUNTER — OFFICE VISIT (OUTPATIENT)
Dept: CARDIOLOGY CLINIC | Age: 68
End: 2018-06-19

## 2018-06-19 VITALS
DIASTOLIC BLOOD PRESSURE: 70 MMHG | HEIGHT: 76 IN | SYSTOLIC BLOOD PRESSURE: 108 MMHG | OXYGEN SATURATION: 97 % | WEIGHT: 227 LBS | HEART RATE: 74 BPM | BODY MASS INDEX: 27.64 KG/M2

## 2018-06-19 DIAGNOSIS — I48.3 TYPICAL ATRIAL FLUTTER (HCC): Primary | ICD-10-CM

## 2018-06-19 DIAGNOSIS — E11.9 TYPE 2 DIABETES MELLITUS WITHOUT COMPLICATION, WITHOUT LONG-TERM CURRENT USE OF INSULIN (HCC): ICD-10-CM

## 2018-06-19 DIAGNOSIS — E78.2 MIXED HYPERLIPIDEMIA: ICD-10-CM

## 2018-06-19 DIAGNOSIS — I10 ESSENTIAL HYPERTENSION: ICD-10-CM

## 2018-06-19 PROCEDURE — 1111F DSCHRG MED/CURRENT MED MERGE: CPT | Performed by: INTERNAL MEDICINE

## 2018-06-19 PROCEDURE — 93000 ELECTROCARDIOGRAM COMPLETE: CPT | Performed by: INTERNAL MEDICINE

## 2018-06-19 PROCEDURE — G8417 CALC BMI ABV UP PARAM F/U: HCPCS | Performed by: INTERNAL MEDICINE

## 2018-06-19 PROCEDURE — 3017F COLORECTAL CA SCREEN DOC REV: CPT | Performed by: INTERNAL MEDICINE

## 2018-06-19 PROCEDURE — 4040F PNEUMOC VAC/ADMIN/RCVD: CPT | Performed by: INTERNAL MEDICINE

## 2018-06-19 PROCEDURE — G8427 DOCREV CUR MEDS BY ELIG CLIN: HCPCS | Performed by: INTERNAL MEDICINE

## 2018-06-19 PROCEDURE — 2022F DILAT RTA XM EVC RTNOPTHY: CPT | Performed by: INTERNAL MEDICINE

## 2018-06-19 PROCEDURE — G8598 ASA/ANTIPLAT THER USED: HCPCS | Performed by: INTERNAL MEDICINE

## 2018-06-19 PROCEDURE — 1123F ACP DISCUSS/DSCN MKR DOCD: CPT | Performed by: INTERNAL MEDICINE

## 2018-06-19 PROCEDURE — 1036F TOBACCO NON-USER: CPT | Performed by: INTERNAL MEDICINE

## 2018-06-19 PROCEDURE — 3044F HG A1C LEVEL LT 7.0%: CPT | Performed by: INTERNAL MEDICINE

## 2018-06-19 PROCEDURE — 99214 OFFICE O/P EST MOD 30 MIN: CPT | Performed by: INTERNAL MEDICINE

## 2018-06-25 ENCOUNTER — HOSPITAL ENCOUNTER (OUTPATIENT)
Dept: CARDIAC CATH/INVASIVE PROCEDURES | Age: 68
Discharge: HOME | End: 2018-07-02
Attending: INTERNAL MEDICINE | Admitting: INTERNAL MEDICINE

## 2018-07-02 ENCOUNTER — OFFICE VISIT (OUTPATIENT)
Dept: INTERNAL MEDICINE CLINIC | Age: 68
End: 2018-07-02

## 2018-07-02 VITALS
SYSTOLIC BLOOD PRESSURE: 122 MMHG | HEIGHT: 76 IN | OXYGEN SATURATION: 97 % | HEART RATE: 72 BPM | BODY MASS INDEX: 28.01 KG/M2 | RESPIRATION RATE: 14 BRPM | DIASTOLIC BLOOD PRESSURE: 80 MMHG | WEIGHT: 230 LBS

## 2018-07-02 DIAGNOSIS — I48.3 TYPICAL ATRIAL FLUTTER (HCC): Primary | ICD-10-CM

## 2018-07-02 DIAGNOSIS — E11.8 CONTROLLED TYPE 2 DIABETES MELLITUS WITH COMPLICATION, WITH LONG-TERM CURRENT USE OF INSULIN (HCC): ICD-10-CM

## 2018-07-02 DIAGNOSIS — Z79.4 CONTROLLED TYPE 2 DIABETES MELLITUS WITH COMPLICATION, WITH LONG-TERM CURRENT USE OF INSULIN (HCC): ICD-10-CM

## 2018-07-02 DIAGNOSIS — I10 ESSENTIAL HYPERTENSION: ICD-10-CM

## 2018-07-02 DIAGNOSIS — L08.9 LEFT FOOT INFECTION: ICD-10-CM

## 2018-07-02 PROCEDURE — 1036F TOBACCO NON-USER: CPT | Performed by: INTERNAL MEDICINE

## 2018-07-02 PROCEDURE — G8598 ASA/ANTIPLAT THER USED: HCPCS | Performed by: INTERNAL MEDICINE

## 2018-07-02 PROCEDURE — G8427 DOCREV CUR MEDS BY ELIG CLIN: HCPCS | Performed by: INTERNAL MEDICINE

## 2018-07-02 PROCEDURE — G8417 CALC BMI ABV UP PARAM F/U: HCPCS | Performed by: INTERNAL MEDICINE

## 2018-07-02 PROCEDURE — 3044F HG A1C LEVEL LT 7.0%: CPT | Performed by: INTERNAL MEDICINE

## 2018-07-02 PROCEDURE — 3017F COLORECTAL CA SCREEN DOC REV: CPT | Performed by: INTERNAL MEDICINE

## 2018-07-02 PROCEDURE — 2022F DILAT RTA XM EVC RTNOPTHY: CPT | Performed by: INTERNAL MEDICINE

## 2018-07-02 PROCEDURE — 1123F ACP DISCUSS/DSCN MKR DOCD: CPT | Performed by: INTERNAL MEDICINE

## 2018-07-02 PROCEDURE — 4040F PNEUMOC VAC/ADMIN/RCVD: CPT | Performed by: INTERNAL MEDICINE

## 2018-07-02 PROCEDURE — 99214 OFFICE O/P EST MOD 30 MIN: CPT | Performed by: INTERNAL MEDICINE

## 2018-07-02 RX ORDER — AMOXICILLIN AND CLAVULANATE POTASSIUM 875; 125 MG/1; MG/1
1 TABLET, FILM COATED ORAL 2 TIMES DAILY
Qty: 20 TABLET | Refills: 0 | Status: SHIPPED | OUTPATIENT
Start: 2018-07-02 | End: 2018-07-12

## 2018-07-02 ASSESSMENT — ENCOUNTER SYMPTOMS: GASTROINTESTINAL NEGATIVE: 1

## 2018-07-02 NOTE — PROGRESS NOTES
Subjective:      Patient ID: Faina Wang is a 70 y.o. male. [facility] [place]  Thomas Mcwilliams is here for follow-up of Atrial flutter, HTN and diabetes mellitus . Atrial flutter - S/P RFA. On last visit to cardiology was in sinus rhythm  Had some post procedure hypotension and lisinopril stopped. He remain soff of the Lisinopril. His rhythm appears regular when checked at home. No chest pain or sob. HTN - The patient denies any chest pain, shortness or breath, headaches or palpitations. There is no lower extremity edema. He is currently off of his Liisnopril because eof some dizziness. Diabetes mellitus - on Glipizide, Metformin and Levemir. Blood sugars at home are reasonable. Has evidence of PN. Left great toe infection - has longstanding calloused problem with his left great toe. Walking more on vacation and his toe has become red, somewhat inflamed and \"some pus\" expressed. Redness today a little better. Usually sees Dr. Bismark Michaud. Review of Systems   Constitutional: Positive for fever. Negative for chills and fatigue. Eyes: Positive for visual disturbance. Cardiovascular: Negative. Gastrointestinal: Negative. Skin: Positive for wound. Neurological: Positive for numbness. 14 point ros otherwise negative. Objective:   Physical Exam   Constitutional: He appears well-developed and well-nourished. No distress. Neck: No JVD present. Cardiovascular: Normal rate, regular rhythm and normal heart sounds. Pulmonary/Chest: Effort normal and breath sounds normal. No respiratory distress. He has no wheezes. He exhibits no tenderness. Musculoskeletal: He exhibits no edema. Skin:   Base of left great toe calloused with small open area. No expressible drainage. Minimal redness. Assessment:      1. Typical atrial flutter ([address]    2. Essential hypertension    3. Controlled type 2 diabetes mellitus with complication, with long-term current use of insulin ([address]    4.  Left foot infection

## 2018-08-06 ENCOUNTER — OFFICE VISIT (OUTPATIENT)
Dept: INTERNAL MEDICINE CLINIC | Age: 68
End: 2018-08-06

## 2018-08-06 VITALS
BODY MASS INDEX: 29.1 KG/M2 | RESPIRATION RATE: 16 BRPM | DIASTOLIC BLOOD PRESSURE: 68 MMHG | WEIGHT: 239 LBS | HEIGHT: 76 IN | SYSTOLIC BLOOD PRESSURE: 114 MMHG | OXYGEN SATURATION: 98 % | HEART RATE: 77 BPM

## 2018-08-06 DIAGNOSIS — E11.42 DIABETIC PERIPHERAL NEUROPATHY (HCC): ICD-10-CM

## 2018-08-06 DIAGNOSIS — E11.8 CONTROLLED TYPE 2 DIABETES MELLITUS WITH COMPLICATION, WITH LONG-TERM CURRENT USE OF INSULIN (HCC): Primary | ICD-10-CM

## 2018-08-06 DIAGNOSIS — I48.3 TYPICAL ATRIAL FLUTTER (HCC): ICD-10-CM

## 2018-08-06 DIAGNOSIS — Z79.4 CONTROLLED TYPE 2 DIABETES MELLITUS WITH COMPLICATION, WITH LONG-TERM CURRENT USE OF INSULIN (HCC): Primary | ICD-10-CM

## 2018-08-06 DIAGNOSIS — I10 ESSENTIAL HYPERTENSION: ICD-10-CM

## 2018-08-06 PROCEDURE — G8598 ASA/ANTIPLAT THER USED: HCPCS | Performed by: INTERNAL MEDICINE

## 2018-08-06 PROCEDURE — 4040F PNEUMOC VAC/ADMIN/RCVD: CPT | Performed by: INTERNAL MEDICINE

## 2018-08-06 PROCEDURE — 3017F COLORECTAL CA SCREEN DOC REV: CPT | Performed by: INTERNAL MEDICINE

## 2018-08-06 PROCEDURE — 1036F TOBACCO NON-USER: CPT | Performed by: INTERNAL MEDICINE

## 2018-08-06 PROCEDURE — 2022F DILAT RTA XM EVC RTNOPTHY: CPT | Performed by: INTERNAL MEDICINE

## 2018-08-06 PROCEDURE — G8417 CALC BMI ABV UP PARAM F/U: HCPCS | Performed by: INTERNAL MEDICINE

## 2018-08-06 PROCEDURE — 3044F HG A1C LEVEL LT 7.0%: CPT | Performed by: INTERNAL MEDICINE

## 2018-08-06 PROCEDURE — 1101F PT FALLS ASSESS-DOCD LE1/YR: CPT | Performed by: INTERNAL MEDICINE

## 2018-08-06 PROCEDURE — G8427 DOCREV CUR MEDS BY ELIG CLIN: HCPCS | Performed by: INTERNAL MEDICINE

## 2018-08-06 PROCEDURE — 99214 OFFICE O/P EST MOD 30 MIN: CPT | Performed by: INTERNAL MEDICINE

## 2018-08-06 PROCEDURE — 1123F ACP DISCUSS/DSCN MKR DOCD: CPT | Performed by: INTERNAL MEDICINE

## 2018-08-06 ASSESSMENT — ENCOUNTER SYMPTOMS
GASTROINTESTINAL NEGATIVE: 1
RESPIRATORY NEGATIVE: 1

## 2018-08-06 NOTE — PROGRESS NOTES
Subjective:      Patient ID: Tiffany Bingham is a 70 y.o. male. [facility]  Priti Araujo is here for follow-up of diabetes, HTN, atrial flutter and HL    Diabetes mellitus - on Metformin, Glipizide and Levemir. Last A1C 6.7. His FSBS are reasonably controlled. He has neuropathy but no worse. Sees podiatry regularly. HTN - The patient denies any chest pain, shortness or breath, headaches or palpitations. There is no lower extremity edema. He is not currently on an Ace b/o some problems with hypotension and dizziness. Atrial flutter - S/P RFA. Remains on Eliquis. No chest pain, sob or palpitations. Review of Systems   Constitutional: Negative for activity change, appetite change, fatigue and unexpected weight change. Eyes: Negative for visual disturbance. Respiratory: Negative. Cardiovascular: Negative. Gastrointestinal: Negative. Neurological: Positive for numbness. 14 point ros otherwise negative. Objective:   Physical Exam   Constitutional: He appears well-developed and well-nourished. No distress. Eyes: EOM are normal. Pupils are equal, round, and reactive to light. Neck: No JVD present. Cardiovascular: Normal rate, regular rhythm and normal heart sounds. Pulmonary/Chest: Effort normal and breath sounds normal. No respiratory distress. Abdominal: Soft. Bowel sounds are normal. There is no tenderness. Musculoskeletal: He exhibits no edema. Neurological: He is alert. Monofilament decreased in distal LE. Skin: Skin is warm and dry. Foot exam ok. Assessment:      1. Controlled type 2 diabetes mellitus with complication, with long-term current use of insulin ([address]    2. Diabetic peripheral neuropathy ([address]    3. Essential hypertension    4. Typical atrial flutter ([address]            Plan:      Drea Romero was seen today for diabetes.     Diagnoses and all orders for this visit:    Controlled type 2 diabetes mellitus with complication, with long-term current use of insulin ([address]

## 2018-08-28 ENCOUNTER — OFFICE VISIT (OUTPATIENT)
Dept: INTERNAL MEDICINE CLINIC | Age: 68
End: 2018-08-28

## 2018-08-28 VITALS
BODY MASS INDEX: 29.22 KG/M2 | SYSTOLIC BLOOD PRESSURE: 138 MMHG | HEIGHT: 76 IN | WEIGHT: 240 LBS | TEMPERATURE: 98 F | OXYGEN SATURATION: 98 % | HEART RATE: 78 BPM | DIASTOLIC BLOOD PRESSURE: 74 MMHG | RESPIRATION RATE: 16 BRPM

## 2018-08-28 DIAGNOSIS — M79.89 SOFT TISSUE MASS: Primary | ICD-10-CM

## 2018-08-28 PROCEDURE — 1036F TOBACCO NON-USER: CPT | Performed by: INTERNAL MEDICINE

## 2018-08-28 PROCEDURE — 1123F ACP DISCUSS/DSCN MKR DOCD: CPT | Performed by: INTERNAL MEDICINE

## 2018-08-28 PROCEDURE — G8427 DOCREV CUR MEDS BY ELIG CLIN: HCPCS | Performed by: INTERNAL MEDICINE

## 2018-08-28 PROCEDURE — 3017F COLORECTAL CA SCREEN DOC REV: CPT | Performed by: INTERNAL MEDICINE

## 2018-08-28 PROCEDURE — 99213 OFFICE O/P EST LOW 20 MIN: CPT | Performed by: INTERNAL MEDICINE

## 2018-08-28 PROCEDURE — 1101F PT FALLS ASSESS-DOCD LE1/YR: CPT | Performed by: INTERNAL MEDICINE

## 2018-08-28 PROCEDURE — G8598 ASA/ANTIPLAT THER USED: HCPCS | Performed by: INTERNAL MEDICINE

## 2018-08-28 PROCEDURE — 4040F PNEUMOC VAC/ADMIN/RCVD: CPT | Performed by: INTERNAL MEDICINE

## 2018-08-28 PROCEDURE — G8417 CALC BMI ABV UP PARAM F/U: HCPCS | Performed by: INTERNAL MEDICINE

## 2018-08-29 RX ORDER — INSULIN DETEMIR 100 [IU]/ML
INJECTION, SOLUTION SUBCUTANEOUS
Qty: 15 PEN | Refills: 3 | Status: SHIPPED | OUTPATIENT
Start: 2018-08-29 | End: 2019-12-10 | Stop reason: SDUPTHER

## 2018-11-14 DIAGNOSIS — E11.8 CONTROLLED TYPE 2 DIABETES MELLITUS WITH COMPLICATION, WITH LONG-TERM CURRENT USE OF INSULIN (HCC): ICD-10-CM

## 2018-11-14 DIAGNOSIS — Z79.4 CONTROLLED TYPE 2 DIABETES MELLITUS WITH COMPLICATION, WITH LONG-TERM CURRENT USE OF INSULIN (HCC): ICD-10-CM

## 2018-11-19 ENCOUNTER — OFFICE VISIT (OUTPATIENT)
Dept: INTERNAL MEDICINE CLINIC | Age: 68
End: 2018-11-19
Payer: MEDICARE

## 2018-11-19 VITALS
HEIGHT: 76 IN | SYSTOLIC BLOOD PRESSURE: 130 MMHG | BODY MASS INDEX: 29.06 KG/M2 | RESPIRATION RATE: 18 BRPM | OXYGEN SATURATION: 100 % | HEART RATE: 46 BPM | DIASTOLIC BLOOD PRESSURE: 74 MMHG | WEIGHT: 238.6 LBS

## 2018-11-19 DIAGNOSIS — L97.529 DIABETIC ULCER OF LEFT GREAT TOE (HCC): ICD-10-CM

## 2018-11-19 DIAGNOSIS — G30.1 LATE ONSET ALZHEIMER'S DISEASE WITHOUT BEHAVIORAL DISTURBANCE (HCC): ICD-10-CM

## 2018-11-19 DIAGNOSIS — F02.80 LATE ONSET ALZHEIMER'S DISEASE WITHOUT BEHAVIORAL DISTURBANCE (HCC): ICD-10-CM

## 2018-11-19 DIAGNOSIS — E11.8 CONTROLLED TYPE 2 DIABETES MELLITUS WITH COMPLICATION, WITH LONG-TERM CURRENT USE OF INSULIN (HCC): Primary | ICD-10-CM

## 2018-11-19 DIAGNOSIS — E11.621 DIABETIC ULCER OF LEFT GREAT TOE (HCC): ICD-10-CM

## 2018-11-19 DIAGNOSIS — Z86.31 H/O DIABETIC FOOT ULCER: ICD-10-CM

## 2018-11-19 DIAGNOSIS — E11.42 DIABETIC PERIPHERAL NEUROPATHY (HCC): ICD-10-CM

## 2018-11-19 DIAGNOSIS — Z79.4 CONTROLLED TYPE 2 DIABETES MELLITUS WITH COMPLICATION, WITH LONG-TERM CURRENT USE OF INSULIN (HCC): Primary | ICD-10-CM

## 2018-11-19 DIAGNOSIS — I10 ESSENTIAL HYPERTENSION: ICD-10-CM

## 2018-11-19 PROBLEM — L97.429 DIABETIC ULCER OF LEFT MIDFOOT ASSOCIATED WITH TYPE 2 DIABETES MELLITUS (HCC): Status: ACTIVE | Noted: 2018-11-19

## 2018-11-19 LAB — HBA1C MFR BLD: 8.1 %

## 2018-11-19 PROCEDURE — 2022F DILAT RTA XM EVC RTNOPTHY: CPT | Performed by: INTERNAL MEDICINE

## 2018-11-19 PROCEDURE — 1123F ACP DISCUSS/DSCN MKR DOCD: CPT | Performed by: INTERNAL MEDICINE

## 2018-11-19 PROCEDURE — 99214 OFFICE O/P EST MOD 30 MIN: CPT | Performed by: INTERNAL MEDICINE

## 2018-11-19 PROCEDURE — G8427 DOCREV CUR MEDS BY ELIG CLIN: HCPCS | Performed by: INTERNAL MEDICINE

## 2018-11-19 PROCEDURE — 1101F PT FALLS ASSESS-DOCD LE1/YR: CPT | Performed by: INTERNAL MEDICINE

## 2018-11-19 PROCEDURE — 1036F TOBACCO NON-USER: CPT | Performed by: INTERNAL MEDICINE

## 2018-11-19 PROCEDURE — 3017F COLORECTAL CA SCREEN DOC REV: CPT | Performed by: INTERNAL MEDICINE

## 2018-11-19 PROCEDURE — G8484 FLU IMMUNIZE NO ADMIN: HCPCS | Performed by: INTERNAL MEDICINE

## 2018-11-19 PROCEDURE — 83036 HEMOGLOBIN GLYCOSYLATED A1C: CPT | Performed by: INTERNAL MEDICINE

## 2018-11-19 PROCEDURE — G8598 ASA/ANTIPLAT THER USED: HCPCS | Performed by: INTERNAL MEDICINE

## 2018-11-19 PROCEDURE — 4040F PNEUMOC VAC/ADMIN/RCVD: CPT | Performed by: INTERNAL MEDICINE

## 2018-11-19 PROCEDURE — G8417 CALC BMI ABV UP PARAM F/U: HCPCS | Performed by: INTERNAL MEDICINE

## 2018-11-19 PROCEDURE — 3045F PR MOST RECENT HEMOGLOBIN A1C LEVEL 7.0-9.0%: CPT | Performed by: INTERNAL MEDICINE

## 2018-11-19 ASSESSMENT — PATIENT HEALTH QUESTIONNAIRE - PHQ9
2. FEELING DOWN, DEPRESSED OR HOPELESS: 0
SUM OF ALL RESPONSES TO PHQ9 QUESTIONS 1 & 2: 0
SUM OF ALL RESPONSES TO PHQ QUESTIONS 1-9: 0
SUM OF ALL RESPONSES TO PHQ QUESTIONS 1-9: 0
1. LITTLE INTEREST OR PLEASURE IN DOING THINGS: 0

## 2018-11-19 ASSESSMENT — ENCOUNTER SYMPTOMS
SHORTNESS OF BREATH: 0
COUGH: 0

## 2018-11-19 NOTE — PROGRESS NOTES
Subjective:      Patient ID: María Wise is a 70 y.o. male.       .                                               Review of Systems    Objective:   Physical Exam    Assessment:            Plan:              Jason Jackson MD

## 2018-12-11 ENCOUNTER — NURSE ONLY (OUTPATIENT)
Dept: INTERNAL MEDICINE CLINIC | Age: 68
End: 2018-12-11
Payer: MEDICARE

## 2018-12-11 PROCEDURE — 90662 IIV NO PRSV INCREASED AG IM: CPT | Performed by: INTERNAL MEDICINE

## 2018-12-11 PROCEDURE — G0008 ADMIN INFLUENZA VIRUS VAC: HCPCS | Performed by: INTERNAL MEDICINE

## 2019-01-03 RX ORDER — DONEPEZIL HYDROCHLORIDE 5 MG/1
5 TABLET, FILM COATED ORAL NIGHTLY
Qty: 90 TABLET | Refills: 3 | Status: SHIPPED | OUTPATIENT
Start: 2019-01-03 | End: 2019-10-28 | Stop reason: SDUPTHER

## 2019-01-22 ENCOUNTER — OFFICE VISIT (OUTPATIENT)
Dept: INTERNAL MEDICINE CLINIC | Age: 69
End: 2019-01-22
Payer: MEDICARE

## 2019-01-22 VITALS
BODY MASS INDEX: 28.98 KG/M2 | HEIGHT: 76 IN | RESPIRATION RATE: 16 BRPM | WEIGHT: 238 LBS | DIASTOLIC BLOOD PRESSURE: 70 MMHG | HEART RATE: 72 BPM | SYSTOLIC BLOOD PRESSURE: 110 MMHG

## 2019-01-22 DIAGNOSIS — E11.42 DIABETIC PERIPHERAL NEUROPATHY (HCC): ICD-10-CM

## 2019-01-22 DIAGNOSIS — H33.22 LEFT RETINAL DETACHMENT: ICD-10-CM

## 2019-01-22 DIAGNOSIS — Z01.818 PRE-OP EXAMINATION: Primary | ICD-10-CM

## 2019-01-22 DIAGNOSIS — Z79.4 CONTROLLED TYPE 2 DIABETES MELLITUS WITH COMPLICATION, WITH LONG-TERM CURRENT USE OF INSULIN (HCC): ICD-10-CM

## 2019-01-22 DIAGNOSIS — E11.21 DIABETIC NEPHROPATHY WITH PROTEINURIA (HCC): ICD-10-CM

## 2019-01-22 DIAGNOSIS — E11.8 CONTROLLED TYPE 2 DIABETES MELLITUS WITH COMPLICATION, WITH LONG-TERM CURRENT USE OF INSULIN (HCC): ICD-10-CM

## 2019-01-22 DIAGNOSIS — Z86.79 H/O ATRIAL FLUTTER: ICD-10-CM

## 2019-01-22 DIAGNOSIS — G30.0 EARLY ONSET ALZHEIMER'S DEMENTIA WITHOUT BEHAVIORAL DISTURBANCE (HCC): ICD-10-CM

## 2019-01-22 DIAGNOSIS — I63.9 CEREBELLAR INFARCT (HCC): ICD-10-CM

## 2019-01-22 DIAGNOSIS — F02.80 EARLY ONSET ALZHEIMER'S DEMENTIA WITHOUT BEHAVIORAL DISTURBANCE (HCC): ICD-10-CM

## 2019-01-22 DIAGNOSIS — I10 ESSENTIAL HYPERTENSION: ICD-10-CM

## 2019-01-22 DIAGNOSIS — E78.2 MIXED HYPERLIPIDEMIA: ICD-10-CM

## 2019-01-22 PROCEDURE — G8427 DOCREV CUR MEDS BY ELIG CLIN: HCPCS | Performed by: INTERNAL MEDICINE

## 2019-01-22 PROCEDURE — 3046F HEMOGLOBIN A1C LEVEL >9.0%: CPT | Performed by: INTERNAL MEDICINE

## 2019-01-22 PROCEDURE — 4040F PNEUMOC VAC/ADMIN/RCVD: CPT | Performed by: INTERNAL MEDICINE

## 2019-01-22 PROCEDURE — 2022F DILAT RTA XM EVC RTNOPTHY: CPT | Performed by: INTERNAL MEDICINE

## 2019-01-22 PROCEDURE — G8482 FLU IMMUNIZE ORDER/ADMIN: HCPCS | Performed by: INTERNAL MEDICINE

## 2019-01-22 PROCEDURE — 1036F TOBACCO NON-USER: CPT | Performed by: INTERNAL MEDICINE

## 2019-01-22 PROCEDURE — 3017F COLORECTAL CA SCREEN DOC REV: CPT | Performed by: INTERNAL MEDICINE

## 2019-01-22 PROCEDURE — 1123F ACP DISCUSS/DSCN MKR DOCD: CPT | Performed by: INTERNAL MEDICINE

## 2019-01-22 PROCEDURE — G8598 ASA/ANTIPLAT THER USED: HCPCS | Performed by: INTERNAL MEDICINE

## 2019-01-22 PROCEDURE — 99215 OFFICE O/P EST HI 40 MIN: CPT | Performed by: INTERNAL MEDICINE

## 2019-01-22 PROCEDURE — G8417 CALC BMI ABV UP PARAM F/U: HCPCS | Performed by: INTERNAL MEDICINE

## 2019-01-22 PROCEDURE — 1101F PT FALLS ASSESS-DOCD LE1/YR: CPT | Performed by: INTERNAL MEDICINE

## 2019-01-22 ASSESSMENT — ENCOUNTER SYMPTOMS
EYE PAIN: 0
RESPIRATORY NEGATIVE: 1
GASTROINTESTINAL NEGATIVE: 1

## 2019-03-14 RX ORDER — APIXABAN 5 MG/1
TABLET, FILM COATED ORAL
Qty: 60 TABLET | Refills: 2 | Status: SHIPPED | OUTPATIENT
Start: 2019-03-14

## 2019-03-15 ENCOUNTER — OFFICE VISIT (OUTPATIENT)
Dept: INTERNAL MEDICINE CLINIC | Age: 69
End: 2019-03-15
Payer: MEDICARE

## 2019-03-15 VITALS
RESPIRATION RATE: 12 BRPM | DIASTOLIC BLOOD PRESSURE: 80 MMHG | BODY MASS INDEX: 29.72 KG/M2 | HEIGHT: 75 IN | TEMPERATURE: 98 F | WEIGHT: 239 LBS | SYSTOLIC BLOOD PRESSURE: 140 MMHG | OXYGEN SATURATION: 99 % | HEART RATE: 75 BPM

## 2019-03-15 DIAGNOSIS — I48.20 CHRONIC ATRIAL FIBRILLATION (HCC): ICD-10-CM

## 2019-03-15 DIAGNOSIS — F03.90 DEMENTIA WITHOUT BEHAVIORAL DISTURBANCE, UNSPECIFIED DEMENTIA TYPE: ICD-10-CM

## 2019-03-15 DIAGNOSIS — I10 ESSENTIAL HYPERTENSION: ICD-10-CM

## 2019-03-15 DIAGNOSIS — E11.8 CONTROLLED TYPE 2 DIABETES MELLITUS WITH COMPLICATION, WITH LONG-TERM CURRENT USE OF INSULIN (HCC): ICD-10-CM

## 2019-03-15 DIAGNOSIS — Z79.4 CONTROLLED TYPE 2 DIABETES MELLITUS WITH COMPLICATION, WITH LONG-TERM CURRENT USE OF INSULIN (HCC): ICD-10-CM

## 2019-03-15 DIAGNOSIS — Z01.818 PREOP EXAMINATION: Primary | ICD-10-CM

## 2019-03-15 DIAGNOSIS — E78.2 MIXED HYPERLIPIDEMIA: ICD-10-CM

## 2019-03-15 LAB — HBA1C MFR BLD: 7.3 %

## 2019-03-15 PROCEDURE — 83036 HEMOGLOBIN GLYCOSYLATED A1C: CPT | Performed by: NURSE PRACTITIONER

## 2019-03-15 PROCEDURE — 3017F COLORECTAL CA SCREEN DOC REV: CPT | Performed by: NURSE PRACTITIONER

## 2019-03-15 PROCEDURE — 2022F DILAT RTA XM EVC RTNOPTHY: CPT | Performed by: NURSE PRACTITIONER

## 2019-03-15 PROCEDURE — 1036F TOBACCO NON-USER: CPT | Performed by: NURSE PRACTITIONER

## 2019-03-15 PROCEDURE — G8598 ASA/ANTIPLAT THER USED: HCPCS | Performed by: NURSE PRACTITIONER

## 2019-03-15 PROCEDURE — G8417 CALC BMI ABV UP PARAM F/U: HCPCS | Performed by: NURSE PRACTITIONER

## 2019-03-15 PROCEDURE — 1123F ACP DISCUSS/DSCN MKR DOCD: CPT | Performed by: NURSE PRACTITIONER

## 2019-03-15 PROCEDURE — 4040F PNEUMOC VAC/ADMIN/RCVD: CPT | Performed by: NURSE PRACTITIONER

## 2019-03-15 PROCEDURE — 99215 OFFICE O/P EST HI 40 MIN: CPT | Performed by: NURSE PRACTITIONER

## 2019-03-15 PROCEDURE — 3045F PR MOST RECENT HEMOGLOBIN A1C LEVEL 7.0-9.0%: CPT | Performed by: NURSE PRACTITIONER

## 2019-03-15 PROCEDURE — G8482 FLU IMMUNIZE ORDER/ADMIN: HCPCS | Performed by: NURSE PRACTITIONER

## 2019-03-15 PROCEDURE — G8427 DOCREV CUR MEDS BY ELIG CLIN: HCPCS | Performed by: NURSE PRACTITIONER

## 2019-03-15 PROCEDURE — 1101F PT FALLS ASSESS-DOCD LE1/YR: CPT | Performed by: NURSE PRACTITIONER

## 2019-03-15 ASSESSMENT — ENCOUNTER SYMPTOMS
CONSTIPATION: 0
SHORTNESS OF BREATH: 0
EYE PAIN: 0
VISUAL CHANGE: 0
SORE THROAT: 0
NAUSEA: 0
WHEEZING: 0
TROUBLE SWALLOWING: 0
VOMITING: 0
COUGH: 0
DIARRHEA: 0
BACK PAIN: 0
ABDOMINAL PAIN: 0
PHOTOPHOBIA: 0
BLURRED VISION: 0
CHEST TIGHTNESS: 0
ORTHOPNEA: 0

## 2019-04-22 RX ORDER — ATORVASTATIN CALCIUM 10 MG/1
TABLET, FILM COATED ORAL
Qty: 90 TABLET | Refills: 2 | Status: SHIPPED | OUTPATIENT
Start: 2019-04-22 | End: 2019-12-30

## 2019-05-01 ENCOUNTER — OFFICE VISIT (OUTPATIENT)
Dept: INTERNAL MEDICINE CLINIC | Age: 69
End: 2019-05-01
Payer: MEDICARE

## 2019-05-01 VITALS
OXYGEN SATURATION: 98 % | BODY MASS INDEX: 28.13 KG/M2 | HEART RATE: 94 BPM | RESPIRATION RATE: 16 BRPM | DIASTOLIC BLOOD PRESSURE: 69 MMHG | WEIGHT: 231 LBS | HEIGHT: 76 IN | TEMPERATURE: 98.7 F | SYSTOLIC BLOOD PRESSURE: 112 MMHG

## 2019-05-01 DIAGNOSIS — E11.21 DIABETIC NEPHROPATHY WITH PROTEINURIA (HCC): ICD-10-CM

## 2019-05-01 DIAGNOSIS — E11.8 CONTROLLED TYPE 2 DIABETES MELLITUS WITH COMPLICATION, WITH LONG-TERM CURRENT USE OF INSULIN (HCC): ICD-10-CM

## 2019-05-01 DIAGNOSIS — I63.9 CEREBELLAR INFARCT (HCC): ICD-10-CM

## 2019-05-01 DIAGNOSIS — R53.83 OTHER FATIGUE: ICD-10-CM

## 2019-05-01 DIAGNOSIS — H25.013 CORTICAL AGE-RELATED CATARACT OF BOTH EYES: ICD-10-CM

## 2019-05-01 DIAGNOSIS — I48.3 TYPICAL ATRIAL FLUTTER (HCC): ICD-10-CM

## 2019-05-01 DIAGNOSIS — I10 ESSENTIAL HYPERTENSION: ICD-10-CM

## 2019-05-01 DIAGNOSIS — Z01.818 PRE-OP EXAMINATION: Primary | ICD-10-CM

## 2019-05-01 DIAGNOSIS — Z79.4 CONTROLLED TYPE 2 DIABETES MELLITUS WITH COMPLICATION, WITH LONG-TERM CURRENT USE OF INSULIN (HCC): ICD-10-CM

## 2019-05-01 DIAGNOSIS — E11.42 DIABETIC PERIPHERAL NEUROPATHY (HCC): ICD-10-CM

## 2019-05-01 LAB
A/G RATIO: 1.5 (ref 1.1–2.2)
ALBUMIN SERPL-MCNC: 4.7 G/DL (ref 3.4–5)
ALP BLD-CCNC: 94 U/L (ref 40–129)
ALT SERPL-CCNC: 12 U/L (ref 10–40)
ANION GAP SERPL CALCULATED.3IONS-SCNC: 17 MMOL/L (ref 3–16)
AST SERPL-CCNC: 19 U/L (ref 15–37)
BASOPHILS ABSOLUTE: 0 K/UL (ref 0–0.2)
BASOPHILS RELATIVE PERCENT: 0.4 %
BILIRUB SERPL-MCNC: 1.3 MG/DL (ref 0–1)
BUN BLDV-MCNC: 16 MG/DL (ref 7–20)
CALCIUM SERPL-MCNC: 9.7 MG/DL (ref 8.3–10.6)
CHLORIDE BLD-SCNC: 100 MMOL/L (ref 99–110)
CO2: 26 MMOL/L (ref 21–32)
CREAT SERPL-MCNC: 0.9 MG/DL (ref 0.8–1.3)
EOSINOPHILS ABSOLUTE: 0.1 K/UL (ref 0–0.6)
EOSINOPHILS RELATIVE PERCENT: 0.6 %
GFR AFRICAN AMERICAN: >60
GFR NON-AFRICAN AMERICAN: >60
GLOBULIN: 3.1 G/DL
GLUCOSE BLD-MCNC: 108 MG/DL (ref 70–99)
HCT VFR BLD CALC: 40.4 % (ref 40.5–52.5)
HEMOGLOBIN: 13.6 G/DL (ref 13.5–17.5)
LYMPHOCYTES ABSOLUTE: 1.4 K/UL (ref 1–5.1)
LYMPHOCYTES RELATIVE PERCENT: 13.8 %
MCH RBC QN AUTO: 30.5 PG (ref 26–34)
MCHC RBC AUTO-ENTMCNC: 33.8 G/DL (ref 31–36)
MCV RBC AUTO: 90.4 FL (ref 80–100)
MONOCYTES ABSOLUTE: 1 K/UL (ref 0–1.3)
MONOCYTES RELATIVE PERCENT: 9.9 %
NEUTROPHILS ABSOLUTE: 7.4 K/UL (ref 1.7–7.7)
NEUTROPHILS RELATIVE PERCENT: 75.3 %
PDW BLD-RTO: 13.7 % (ref 12.4–15.4)
PLATELET # BLD: 340 K/UL (ref 135–450)
PMV BLD AUTO: 9.4 FL (ref 5–10.5)
POTASSIUM SERPL-SCNC: 5 MMOL/L (ref 3.5–5.1)
RBC # BLD: 4.47 M/UL (ref 4.2–5.9)
SODIUM BLD-SCNC: 143 MMOL/L (ref 136–145)
TOTAL PROTEIN: 7.8 G/DL (ref 6.4–8.2)
TSH REFLEX: 1.89 UIU/ML (ref 0.27–4.2)
WBC # BLD: 9.8 K/UL (ref 4–11)

## 2019-05-01 PROCEDURE — 4040F PNEUMOC VAC/ADMIN/RCVD: CPT | Performed by: INTERNAL MEDICINE

## 2019-05-01 PROCEDURE — 1036F TOBACCO NON-USER: CPT | Performed by: INTERNAL MEDICINE

## 2019-05-01 PROCEDURE — 36415 COLL VENOUS BLD VENIPUNCTURE: CPT | Performed by: INTERNAL MEDICINE

## 2019-05-01 PROCEDURE — G8598 ASA/ANTIPLAT THER USED: HCPCS | Performed by: INTERNAL MEDICINE

## 2019-05-01 PROCEDURE — 3017F COLORECTAL CA SCREEN DOC REV: CPT | Performed by: INTERNAL MEDICINE

## 2019-05-01 PROCEDURE — 3045F PR MOST RECENT HEMOGLOBIN A1C LEVEL 7.0-9.0%: CPT | Performed by: INTERNAL MEDICINE

## 2019-05-01 PROCEDURE — 2022F DILAT RTA XM EVC RTNOPTHY: CPT | Performed by: INTERNAL MEDICINE

## 2019-05-01 PROCEDURE — G8417 CALC BMI ABV UP PARAM F/U: HCPCS | Performed by: INTERNAL MEDICINE

## 2019-05-01 PROCEDURE — 99215 OFFICE O/P EST HI 40 MIN: CPT | Performed by: INTERNAL MEDICINE

## 2019-05-01 PROCEDURE — 1123F ACP DISCUSS/DSCN MKR DOCD: CPT | Performed by: INTERNAL MEDICINE

## 2019-05-01 PROCEDURE — G8427 DOCREV CUR MEDS BY ELIG CLIN: HCPCS | Performed by: INTERNAL MEDICINE

## 2019-05-01 ASSESSMENT — ENCOUNTER SYMPTOMS
RESPIRATORY NEGATIVE: 1
GASTROINTESTINAL NEGATIVE: 1

## 2019-05-01 NOTE — PROGRESS NOTES
strips (ONE TOUCH ULTRA TEST) strip USE TO TEST ONCE DAILY  E11.9 100 strip 3    B-D ULTRAFINE III SHORT PEN 31G X 8 MM MISC USE ONCE DAILY WITH LEVEMIR FLEX  each 2    ACCU-CHEK BILLY strip TEST TWO TIMES A DAY 50 strip 2     No current facility-administered medications for this visit.         No Known Allergies    Social History     Socioeconomic History    Marital status:      Spouse name: Luis F Howell Number of children: 2    Years of education: 25    Highest education level: Not on file   Occupational History    Occupation: retired   Social Needs    Financial resource strain: Not on file    Food insecurity:     Worry: Not on file     Inability: Not on file   Transgenomic needs:     Medical: Not on file     Non-medical: Not on file   Tobacco Use    Smoking status: Former Smoker     Packs/day: 1.00     Years: 13.00     Pack years: 13.00     Types: Cigarettes     Last attempt to quit: 1981     Years since quittin.8    Smokeless tobacco: Never Used   Substance and Sexual Activity    Alcohol use: Yes     Comment: rare    Drug use: No    Sexual activity: Not on file   Lifestyle    Physical activity:     Days per week: Not on file     Minutes per session: Not on file    Stress: Not on file   Relationships    Social connections:     Talks on phone: Not on file     Gets together: Not on file     Attends Latter-day service: Not on file     Active member of club or organization: Not on file     Attends meetings of clubs or organizations: Not on file     Relationship status: Not on file    Intimate partner violence:     Fear of current or ex partner: Not on file     Emotionally abused: Not on file     Physically abused: Not on file     Forced sexual activity: Not on file   Other Topics Concern    Not on file   Social History Narrative    Not on file       Family History   Problem Relation Age of Onset    Cancer Mother         Cancer    Cancer Father         Bone cancer    No Known Problems Sister     Cancer Sister         Lung cancer    No Known Problems Brother     No Known Problems Maternal Aunt     No Known Problems Maternal Uncle     No Known Problems Paternal Aunt     No Known Problems Paternal Uncle     No Known Problems Maternal Grandmother     No Known Problems Maternal Grandfather     No Known Problems Paternal Grandmother     No Known Problems Paternal Grandfather     No Known Problems Other     Anesth Problems Neg Hx     Broken Bones Neg Hx     Clotting Disorder Neg Hx     Collagen Disease Neg Hx     Diabetes Neg Hx     Dislocations Neg Hx     Osteoporosis Neg Hx     Rheumatologic Disease Neg Hx     Scoliosis Neg Hx     Severe Sprains Neg Hx          Review of Systems   Constitutional: Negative. Eyes: Positive for visual disturbance. Respiratory: Negative. Cardiovascular: Negative. Gastrointestinal: Negative. Objective:   Physical Exam   Constitutional: He is oriented to person, place, and time. He appears well-developed and well-nourished. No distress. Eyes: Pupils are equal, round, and reactive to light. Cataracts bilaterally. Neck: No JVD present. Cardiovascular: Normal rate, regular rhythm and normal heart sounds. Occasional prematures. Pulmonary/Chest: Effort normal and breath sounds normal. No stridor. No respiratory distress. He has no wheezes. Abdominal: Soft. Bowel sounds are normal.   Musculoskeletal: He exhibits no edema. Neurological: He is alert and oriented to person, place, and time. Skin: Skin is warm and dry. Assessment:      1. Pre-op examination    2. Cortical age-related cataract of both eyes    3. Controlled type 2 diabetes mellitus with complication, with long-term current use of insulin ([address]    4. Diabetic nephropathy with proteinuria ([address]    5. Diabetic peripheral neuropathy ([address]    6. Cerebellar infarct    7.  Essential hypertension            Plan:      Jorge Brewster seems stable for the anticipated surgical procedure. He has no history of anesthesia problems.         Elke Dueñas MD

## 2019-06-04 ENCOUNTER — OFFICE VISIT (OUTPATIENT)
Dept: INTERNAL MEDICINE CLINIC | Age: 69
End: 2019-06-04
Payer: MEDICARE

## 2019-06-04 VITALS
WEIGHT: 236 LBS | SYSTOLIC BLOOD PRESSURE: 114 MMHG | OXYGEN SATURATION: 98 % | BODY MASS INDEX: 28.73 KG/M2 | HEART RATE: 78 BPM | DIASTOLIC BLOOD PRESSURE: 76 MMHG

## 2019-06-04 DIAGNOSIS — L24.7 IRRITANT CONTACT DERMATITIS DUE TO PLANTS, EXCEPT FOOD: Primary | ICD-10-CM

## 2019-06-04 DIAGNOSIS — E16.2 HYPOGLYCEMIA: ICD-10-CM

## 2019-06-04 PROCEDURE — 4040F PNEUMOC VAC/ADMIN/RCVD: CPT | Performed by: INTERNAL MEDICINE

## 2019-06-04 PROCEDURE — G8427 DOCREV CUR MEDS BY ELIG CLIN: HCPCS | Performed by: INTERNAL MEDICINE

## 2019-06-04 PROCEDURE — 3017F COLORECTAL CA SCREEN DOC REV: CPT | Performed by: INTERNAL MEDICINE

## 2019-06-04 PROCEDURE — G8598 ASA/ANTIPLAT THER USED: HCPCS | Performed by: INTERNAL MEDICINE

## 2019-06-04 PROCEDURE — 1036F TOBACCO NON-USER: CPT | Performed by: INTERNAL MEDICINE

## 2019-06-04 PROCEDURE — 99213 OFFICE O/P EST LOW 20 MIN: CPT | Performed by: INTERNAL MEDICINE

## 2019-06-04 PROCEDURE — G8417 CALC BMI ABV UP PARAM F/U: HCPCS | Performed by: INTERNAL MEDICINE

## 2019-06-04 PROCEDURE — 1123F ACP DISCUSS/DSCN MKR DOCD: CPT | Performed by: INTERNAL MEDICINE

## 2019-06-04 RX ORDER — PREDNISONE 20 MG/1
20 TABLET ORAL 2 TIMES DAILY
Qty: 6 TABLET | Refills: 0 | Status: SHIPPED | OUTPATIENT
Start: 2019-06-04 | End: 2019-06-07

## 2019-08-23 RX ORDER — GLIPIZIDE 5 MG/1
TABLET ORAL
Qty: 360 TABLET | Refills: 2 | Status: SHIPPED | OUTPATIENT
Start: 2019-08-23 | End: 2020-06-22

## 2019-10-10 ENCOUNTER — OFFICE VISIT (OUTPATIENT)
Dept: INTERNAL MEDICINE CLINIC | Age: 69
End: 2019-10-10
Payer: MEDICARE

## 2019-10-10 ENCOUNTER — APPOINTMENT (OUTPATIENT)
Dept: GENERAL RADIOLOGY | Age: 69
End: 2019-10-10
Payer: MEDICARE

## 2019-10-10 ENCOUNTER — HOSPITAL ENCOUNTER (EMERGENCY)
Age: 69
Discharge: HOME | End: 2019-10-10
Attending: EMERGENCY MEDICINE
Payer: MEDICARE

## 2019-10-10 VITALS
OXYGEN SATURATION: 100 % | HEIGHT: 76 IN | SYSTOLIC BLOOD PRESSURE: 109 MMHG | HEART RATE: 90 BPM | WEIGHT: 250.44 LBS | RESPIRATION RATE: 16 BRPM | BODY MASS INDEX: 30.5 KG/M2 | DIASTOLIC BLOOD PRESSURE: 58 MMHG | TEMPERATURE: 98.6 F

## 2019-10-10 VITALS
BODY MASS INDEX: 28.42 KG/M2 | SYSTOLIC BLOOD PRESSURE: 84 MMHG | HEIGHT: 76 IN | DIASTOLIC BLOOD PRESSURE: 60 MMHG | WEIGHT: 233.4 LBS | RESPIRATION RATE: 18 BRPM | HEART RATE: 84 BPM | TEMPERATURE: 98 F | OXYGEN SATURATION: 99 %

## 2019-10-10 DIAGNOSIS — I95.9 HYPOTENSION, UNSPECIFIED HYPOTENSION TYPE: ICD-10-CM

## 2019-10-10 DIAGNOSIS — R19.7 DIARRHEA, UNSPECIFIED TYPE: Primary | ICD-10-CM

## 2019-10-10 DIAGNOSIS — E11.8 CONTROLLED TYPE 2 DIABETES MELLITUS WITH COMPLICATION, WITH LONG-TERM CURRENT USE OF INSULIN (HCC): ICD-10-CM

## 2019-10-10 DIAGNOSIS — Z79.4 CONTROLLED TYPE 2 DIABETES MELLITUS WITH COMPLICATION, WITH LONG-TERM CURRENT USE OF INSULIN (HCC): ICD-10-CM

## 2019-10-10 DIAGNOSIS — I48.20 CHRONIC ATRIAL FIBRILLATION (HCC): ICD-10-CM

## 2019-10-10 DIAGNOSIS — E86.0 DEHYDRATION: ICD-10-CM

## 2019-10-10 DIAGNOSIS — R53.83 FATIGUE, UNSPECIFIED TYPE: ICD-10-CM

## 2019-10-10 DIAGNOSIS — R09.89 CHEST CONGESTION: Primary | ICD-10-CM

## 2019-10-10 DIAGNOSIS — R51.9 NONINTRACTABLE HEADACHE, UNSPECIFIED CHRONICITY PATTERN, UNSPECIFIED HEADACHE TYPE: ICD-10-CM

## 2019-10-10 LAB
A/G RATIO: 1.3 (ref 1.1–2.2)
ALBUMIN SERPL-MCNC: 4.3 G/DL (ref 3.4–5)
ALBUMIN SERPL-MCNC: 4.4 G/DL (ref 3.4–5)
ALP BLD-CCNC: 96 U/L (ref 40–129)
ALP BLD-CCNC: 98 U/L (ref 40–129)
ALT SERPL-CCNC: 11 U/L (ref 10–40)
ALT SERPL-CCNC: 9 U/L (ref 10–40)
ANION GAP SERPL CALCULATED.3IONS-SCNC: 15 MMOL/L (ref 3–16)
ANION GAP SERPL CALCULATED.3IONS-SCNC: 17 MMOL/L (ref 3–16)
AST SERPL-CCNC: 11 U/L (ref 15–37)
AST SERPL-CCNC: 15 U/L (ref 15–37)
BASOPHILS ABSOLUTE: 0.2 K/UL (ref 0–0.2)
BASOPHILS ABSOLUTE: 0.3 K/UL (ref 0–0.2)
BASOPHILS RELATIVE PERCENT: 1 %
BASOPHILS RELATIVE PERCENT: 1.5 %
BILIRUB SERPL-MCNC: 1.1 MG/DL (ref 0–1)
BILIRUB SERPL-MCNC: 1.1 MG/DL (ref 0–1)
BILIRUBIN DIRECT: <0.2 MG/DL (ref 0–0.3)
BILIRUBIN, INDIRECT: ABNORMAL MG/DL (ref 0–1)
BUN BLDV-MCNC: 16 MG/DL (ref 7–20)
BUN BLDV-MCNC: 17 MG/DL (ref 7–20)
C DIFF TOXIN/ANTIGEN: NORMAL
CALCIUM SERPL-MCNC: 9.4 MG/DL (ref 8.3–10.6)
CALCIUM SERPL-MCNC: 9.4 MG/DL (ref 8.3–10.6)
CHLORIDE BLD-SCNC: 94 MMOL/L (ref 99–110)
CHLORIDE BLD-SCNC: 96 MMOL/L (ref 99–110)
CO2: 25 MMOL/L (ref 21–32)
CO2: 26 MMOL/L (ref 21–32)
CREAT SERPL-MCNC: 0.9 MG/DL (ref 0.8–1.3)
CREAT SERPL-MCNC: 0.9 MG/DL (ref 0.8–1.3)
EOSINOPHILS ABSOLUTE: 0 K/UL (ref 0–0.6)
EOSINOPHILS ABSOLUTE: 0.1 K/UL (ref 0–0.6)
EOSINOPHILS RELATIVE PERCENT: 0 %
EOSINOPHILS RELATIVE PERCENT: 0.4 %
GFR AFRICAN AMERICAN: >60
GFR AFRICAN AMERICAN: >60
GFR NON-AFRICAN AMERICAN: >60
GFR NON-AFRICAN AMERICAN: >60
GLOBULIN: 3.3 G/DL
GLUCOSE BLD-MCNC: 139 MG/DL (ref 70–99)
GLUCOSE BLD-MCNC: 152 MG/DL (ref 70–99)
HBA1C MFR BLD: 7.6 %
HCT VFR BLD CALC: 36.8 % (ref 40.5–52.5)
HCT VFR BLD CALC: 39.4 % (ref 40.5–52.5)
HEMOGLOBIN: 12.4 G/DL (ref 13.5–17.5)
HEMOGLOBIN: 13.3 G/DL (ref 13.5–17.5)
LACTIC ACID: 1.8 MMOL/L (ref 0.4–2)
LIPASE: 20 U/L (ref 13–60)
LYMPHOCYTES ABSOLUTE: 1.1 K/UL (ref 1–5.1)
LYMPHOCYTES ABSOLUTE: 1.1 K/UL (ref 1–5.1)
LYMPHOCYTES RELATIVE PERCENT: 6 %
LYMPHOCYTES RELATIVE PERCENT: 6.7 %
MCH RBC QN AUTO: 30.1 PG (ref 26–34)
MCH RBC QN AUTO: 30.3 PG (ref 26–34)
MCHC RBC AUTO-ENTMCNC: 33.6 G/DL (ref 31–36)
MCHC RBC AUTO-ENTMCNC: 33.8 G/DL (ref 31–36)
MCV RBC AUTO: 89 FL (ref 80–100)
MCV RBC AUTO: 90 FL (ref 80–100)
MONOCYTES ABSOLUTE: 0.9 K/UL (ref 0–1.3)
MONOCYTES ABSOLUTE: 1.6 K/UL (ref 0–1.3)
MONOCYTES RELATIVE PERCENT: 5 %
MONOCYTES RELATIVE PERCENT: 9.8 %
NEUTROPHILS ABSOLUTE: 13.6 K/UL (ref 1.7–7.7)
NEUTROPHILS ABSOLUTE: 16 K/UL (ref 1.7–7.7)
NEUTROPHILS RELATIVE PERCENT: 81.6 %
NEUTROPHILS RELATIVE PERCENT: 88 %
PDW BLD-RTO: 12.9 % (ref 12.4–15.4)
PDW BLD-RTO: 12.9 % (ref 12.4–15.4)
PLATELET # BLD: 333 K/UL (ref 135–450)
PLATELET # BLD: 427 K/UL (ref 135–450)
PLATELET SLIDE REVIEW: ADEQUATE
PMV BLD AUTO: 8.4 FL (ref 5–10.5)
PMV BLD AUTO: 8.8 FL (ref 5–10.5)
POTASSIUM REFLEX MAGNESIUM: 3.8 MMOL/L (ref 3.5–5.1)
POTASSIUM SERPL-SCNC: 4.3 MMOL/L (ref 3.5–5.1)
RBC # BLD: 4.09 M/UL (ref 4.2–5.9)
RBC # BLD: 4.42 M/UL (ref 4.2–5.9)
REASON FOR REJECTION: NORMAL
REJECTED TEST: NORMAL
SLIDE REVIEW: ABNORMAL
SODIUM BLD-SCNC: 135 MMOL/L (ref 136–145)
SODIUM BLD-SCNC: 138 MMOL/L (ref 136–145)
T4 FREE: 1.3 NG/DL (ref 0.9–1.8)
TOTAL PROTEIN: 7.1 G/DL (ref 6.4–8.2)
TOTAL PROTEIN: 7.7 G/DL (ref 6.4–8.2)
TROPONIN: <0.01 NG/ML
TSH REFLEX: 1.7 UIU/ML (ref 0.27–4.2)
WBC # BLD: 16.6 K/UL (ref 4–11)
WBC # BLD: 18.2 K/UL (ref 4–11)

## 2019-10-10 PROCEDURE — 36415 COLL VENOUS BLD VENIPUNCTURE: CPT

## 2019-10-10 PROCEDURE — 83605 ASSAY OF LACTIC ACID: CPT

## 2019-10-10 PROCEDURE — 87449 NOS EACH ORGANISM AG IA: CPT

## 2019-10-10 PROCEDURE — 87324 CLOSTRIDIUM AG IA: CPT

## 2019-10-10 PROCEDURE — 84484 ASSAY OF TROPONIN QUANT: CPT

## 2019-10-10 PROCEDURE — 71046 X-RAY EXAM CHEST 2 VIEWS: CPT

## 2019-10-10 PROCEDURE — 96361 HYDRATE IV INFUSION ADD-ON: CPT

## 2019-10-10 PROCEDURE — 2580000003 HC RX 258: Performed by: EMERGENCY MEDICINE

## 2019-10-10 PROCEDURE — 93005 ELECTROCARDIOGRAM TRACING: CPT | Performed by: EMERGENCY MEDICINE

## 2019-10-10 PROCEDURE — 99284 EMERGENCY DEPT VISIT MOD MDM: CPT

## 2019-10-10 PROCEDURE — 96360 HYDRATION IV INFUSION INIT: CPT

## 2019-10-10 PROCEDURE — 80053 COMPREHEN METABOLIC PANEL: CPT

## 2019-10-10 PROCEDURE — 93000 ELECTROCARDIOGRAM COMPLETE: CPT | Performed by: NURSE PRACTITIONER

## 2019-10-10 PROCEDURE — 85025 COMPLETE CBC W/AUTO DIFF WBC: CPT

## 2019-10-10 PROCEDURE — 36415 COLL VENOUS BLD VENIPUNCTURE: CPT | Performed by: NURSE PRACTITIONER

## 2019-10-10 PROCEDURE — 99213 OFFICE O/P EST LOW 20 MIN: CPT | Performed by: NURSE PRACTITIONER

## 2019-10-10 PROCEDURE — 83036 HEMOGLOBIN GLYCOSYLATED A1C: CPT | Performed by: NURSE PRACTITIONER

## 2019-10-10 PROCEDURE — 83690 ASSAY OF LIPASE: CPT

## 2019-10-10 RX ORDER — SODIUM CHLORIDE, SODIUM LACTATE, POTASSIUM CHLORIDE, CALCIUM CHLORIDE 600; 310; 30; 20 MG/100ML; MG/100ML; MG/100ML; MG/100ML
1000 INJECTION, SOLUTION INTRAVENOUS ONCE
Status: COMPLETED | OUTPATIENT
Start: 2019-10-10 | End: 2019-10-10

## 2019-10-10 RX ORDER — AMOXICILLIN AND CLAVULANATE POTASSIUM 875; 125 MG/1; MG/1
1 TABLET, FILM COATED ORAL 2 TIMES DAILY
Qty: 14 TABLET | Refills: 0 | Status: SHIPPED | OUTPATIENT
Start: 2019-10-10 | End: 2019-10-10 | Stop reason: HOSPADM

## 2019-10-10 RX ORDER — ONDANSETRON 4 MG/1
4-8 TABLET, ORALLY DISINTEGRATING ORAL EVERY 12 HOURS PRN
Qty: 12 TABLET | Refills: 0 | Status: SHIPPED | OUTPATIENT
Start: 2019-10-10 | End: 2020-05-18 | Stop reason: ALTCHOICE

## 2019-10-10 RX ADMIN — SODIUM CHLORIDE, POTASSIUM CHLORIDE, SODIUM LACTATE AND CALCIUM CHLORIDE 1000 ML: 600; 310; 30; 20 INJECTION, SOLUTION INTRAVENOUS at 18:24

## 2019-10-10 ASSESSMENT — PATIENT HEALTH QUESTIONNAIRE - PHQ9
SUM OF ALL RESPONSES TO PHQ9 QUESTIONS 1 & 2: 2
SUM OF ALL RESPONSES TO PHQ QUESTIONS 1-9: 2
SUM OF ALL RESPONSES TO PHQ QUESTIONS 1-9: 2
1. LITTLE INTEREST OR PLEASURE IN DOING THINGS: 0
2. FEELING DOWN, DEPRESSED OR HOPELESS: 2

## 2019-10-10 ASSESSMENT — ENCOUNTER SYMPTOMS
SHORTNESS OF BREATH: 1
VISUAL CHANGE: 0
VOMITING: 0
BACK PAIN: 0
SINUS PRESSURE: 0
WHEEZING: 0
CONSTIPATION: 0
NAUSEA: 0
RHINORRHEA: 1
ABDOMINAL PAIN: 0
PHOTOPHOBIA: 0
COUGH: 1
DIARRHEA: 1
BLURRED VISION: 0
COUGH: 0
EYES NEGATIVE: 1
SCALP TENDERNESS: 1
NAUSEA: 0
ABDOMINAL DISTENTION: 0
RESPIRATORY NEGATIVE: 1
SINUS PAIN: 0
DIARRHEA: 1
EYE PAIN: 0
SHORTNESS OF BREATH: 0
CHEST TIGHTNESS: 1
EYE DISCHARGE: 0
SORE THROAT: 0
VOMITING: 0
TROUBLE SWALLOWING: 0

## 2019-10-11 ENCOUNTER — TELEPHONE (OUTPATIENT)
Dept: INTERNAL MEDICINE CLINIC | Age: 69
End: 2019-10-11

## 2019-10-11 LAB
EKG ATRIAL RATE: 85 BPM
EKG DIAGNOSIS: NORMAL
EKG P AXIS: 55 DEGREES
EKG P-R INTERVAL: 134 MS
EKG Q-T INTERVAL: 370 MS
EKG QRS DURATION: 88 MS
EKG QTC CALCULATION (BAZETT): 440 MS
EKG R AXIS: -10 DEGREES
EKG T AXIS: -1 DEGREES
EKG VENTRICULAR RATE: 85 BPM

## 2019-10-11 PROCEDURE — 93010 ELECTROCARDIOGRAM REPORT: CPT | Performed by: INTERNAL MEDICINE

## 2019-10-11 RX ORDER — AMOXICILLIN AND CLAVULANATE POTASSIUM 875; 125 MG/1; MG/1
TABLET, FILM COATED ORAL
COMMUNITY
Start: 2019-10-10 | End: 2019-10-14 | Stop reason: ALTCHOICE

## 2019-10-11 RX ORDER — LISINOPRIL 10 MG/1
10 TABLET ORAL
COMMUNITY
Start: 2019-08-12 | End: 2020-05-18 | Stop reason: SINTOL

## 2019-10-11 RX ORDER — OFLOXACIN 3 MG/ML
1 SOLUTION/ DROPS OPHTHALMIC
COMMUNITY

## 2019-10-11 RX ORDER — PREDNISOLONE ACETATE 10 MG/ML
1 SUSPENSION/ DROPS OPHTHALMIC
COMMUNITY
End: 2019-10-14 | Stop reason: ALTCHOICE

## 2019-10-11 RX ORDER — KETOROLAC TROMETHAMINE 5 MG/ML
1 SOLUTION OPHTHALMIC
COMMUNITY
End: 2020-05-18

## 2019-10-14 ENCOUNTER — OFFICE VISIT (OUTPATIENT)
Dept: INTERNAL MEDICINE CLINIC | Age: 69
End: 2019-10-14
Payer: MEDICARE

## 2019-10-14 VITALS
BODY MASS INDEX: 28.15 KG/M2 | DIASTOLIC BLOOD PRESSURE: 62 MMHG | SYSTOLIC BLOOD PRESSURE: 108 MMHG | HEART RATE: 83 BPM | HEIGHT: 76 IN | TEMPERATURE: 98.1 F | RESPIRATION RATE: 18 BRPM | OXYGEN SATURATION: 99 % | WEIGHT: 231.2 LBS

## 2019-10-14 DIAGNOSIS — R10.31 RLQ ABDOMINAL PAIN: Primary | ICD-10-CM

## 2019-10-14 DIAGNOSIS — E86.0 DEHYDRATION: ICD-10-CM

## 2019-10-14 LAB
BASOPHILS ABSOLUTE: 0 K/UL (ref 0–0.2)
BASOPHILS RELATIVE PERCENT: 0.4 %
EOSINOPHILS ABSOLUTE: 0.2 K/UL (ref 0–0.6)
EOSINOPHILS RELATIVE PERCENT: 1.9 %
HCT VFR BLD CALC: 35.2 % (ref 40.5–52.5)
HEMOGLOBIN: 12.1 G/DL (ref 13.5–17.5)
LYMPHOCYTES ABSOLUTE: 1.1 K/UL (ref 1–5.1)
LYMPHOCYTES RELATIVE PERCENT: 11.6 %
MCH RBC QN AUTO: 30.6 PG (ref 26–34)
MCHC RBC AUTO-ENTMCNC: 34.3 G/DL (ref 31–36)
MCV RBC AUTO: 89 FL (ref 80–100)
MONOCYTES ABSOLUTE: 1.2 K/UL (ref 0–1.3)
MONOCYTES RELATIVE PERCENT: 12.2 %
NEUTROPHILS ABSOLUTE: 7 K/UL (ref 1.7–7.7)
NEUTROPHILS RELATIVE PERCENT: 73.9 %
PDW BLD-RTO: 13 % (ref 12.4–15.4)
PLATELET # BLD: 419 K/UL (ref 135–450)
PMV BLD AUTO: 8.4 FL (ref 5–10.5)
RBC # BLD: 3.95 M/UL (ref 4.2–5.9)
WBC # BLD: 9.5 K/UL (ref 4–11)

## 2019-10-14 PROCEDURE — 1123F ACP DISCUSS/DSCN MKR DOCD: CPT | Performed by: INTERNAL MEDICINE

## 2019-10-14 PROCEDURE — G8484 FLU IMMUNIZE NO ADMIN: HCPCS | Performed by: INTERNAL MEDICINE

## 2019-10-14 PROCEDURE — G8417 CALC BMI ABV UP PARAM F/U: HCPCS | Performed by: INTERNAL MEDICINE

## 2019-10-14 PROCEDURE — 3017F COLORECTAL CA SCREEN DOC REV: CPT | Performed by: INTERNAL MEDICINE

## 2019-10-14 PROCEDURE — G8427 DOCREV CUR MEDS BY ELIG CLIN: HCPCS | Performed by: INTERNAL MEDICINE

## 2019-10-14 PROCEDURE — 4040F PNEUMOC VAC/ADMIN/RCVD: CPT | Performed by: INTERNAL MEDICINE

## 2019-10-14 PROCEDURE — 1036F TOBACCO NON-USER: CPT | Performed by: INTERNAL MEDICINE

## 2019-10-14 PROCEDURE — 36415 COLL VENOUS BLD VENIPUNCTURE: CPT | Performed by: INTERNAL MEDICINE

## 2019-10-14 PROCEDURE — G8598 ASA/ANTIPLAT THER USED: HCPCS | Performed by: INTERNAL MEDICINE

## 2019-10-14 PROCEDURE — 99214 OFFICE O/P EST MOD 30 MIN: CPT | Performed by: INTERNAL MEDICINE

## 2019-10-14 ASSESSMENT — ENCOUNTER SYMPTOMS
BLOOD IN STOOL: 0
ABDOMINAL PAIN: 1
NAUSEA: 0
VOMITING: 0
DIARRHEA: 0
RESPIRATORY NEGATIVE: 1

## 2019-10-15 ENCOUNTER — HOSPITAL ENCOUNTER (OUTPATIENT)
Dept: CT IMAGING | Age: 69
Discharge: HOME | End: 2019-10-15
Payer: MEDICARE

## 2019-10-15 DIAGNOSIS — R10.31 RLQ ABDOMINAL PAIN: ICD-10-CM

## 2019-10-15 PROCEDURE — 74176 CT ABD & PELVIS W/O CONTRAST: CPT

## 2019-10-23 ENCOUNTER — NURSE ONLY (OUTPATIENT)
Dept: INTERNAL MEDICINE CLINIC | Age: 69
End: 2019-10-23
Payer: MEDICARE

## 2019-10-23 DIAGNOSIS — Z23 NEED FOR INFLUENZA VACCINATION: Primary | ICD-10-CM

## 2019-10-23 PROCEDURE — G0008 ADMIN INFLUENZA VIRUS VAC: HCPCS | Performed by: INTERNAL MEDICINE

## 2019-10-23 PROCEDURE — 90653 IIV ADJUVANT VACCINE IM: CPT | Performed by: INTERNAL MEDICINE

## 2019-10-28 RX ORDER — DONEPEZIL HYDROCHLORIDE 5 MG/1
TABLET, FILM COATED ORAL
Qty: 90 TABLET | Refills: 1 | Status: SHIPPED | OUTPATIENT
Start: 2019-10-28 | End: 2020-04-20 | Stop reason: SDUPTHER

## 2019-10-29 ENCOUNTER — OFFICE VISIT (OUTPATIENT)
Dept: INTERNAL MEDICINE CLINIC | Age: 69
End: 2019-10-29
Payer: MEDICARE

## 2019-10-29 ENCOUNTER — OFFICE VISIT (OUTPATIENT)
Dept: PSYCHOLOGY | Age: 69
End: 2019-10-29
Payer: MEDICARE

## 2019-10-29 VITALS
RESPIRATION RATE: 16 BRPM | SYSTOLIC BLOOD PRESSURE: 114 MMHG | DIASTOLIC BLOOD PRESSURE: 70 MMHG | HEART RATE: 68 BPM | WEIGHT: 228 LBS | HEIGHT: 76 IN | BODY MASS INDEX: 27.76 KG/M2

## 2019-10-29 DIAGNOSIS — G30.0 EARLY ONSET ALZHEIMER'S DEMENTIA WITHOUT BEHAVIORAL DISTURBANCE (HCC): ICD-10-CM

## 2019-10-29 DIAGNOSIS — F02.80 EARLY ONSET ALZHEIMER'S DEMENTIA WITHOUT BEHAVIORAL DISTURBANCE (HCC): ICD-10-CM

## 2019-10-29 DIAGNOSIS — F34.1 PERSISTENT DEPRESSIVE DISORDER WITH ANXIOUS DISTRESS, CURRENTLY MODERATE: ICD-10-CM

## 2019-10-29 DIAGNOSIS — R41.3 MEMORY CHANGES: Primary | ICD-10-CM

## 2019-10-29 DIAGNOSIS — G62.9 NEUROPATHY: ICD-10-CM

## 2019-10-29 DIAGNOSIS — F41.9 ANXIETY: Primary | ICD-10-CM

## 2019-10-29 LAB
FOLATE: 17.5 NG/ML (ref 4.78–24.2)
VITAMIN B-12: 306 PG/ML (ref 211–911)

## 2019-10-29 PROCEDURE — 4040F PNEUMOC VAC/ADMIN/RCVD: CPT | Performed by: NURSE PRACTITIONER

## 2019-10-29 PROCEDURE — G8427 DOCREV CUR MEDS BY ELIG CLIN: HCPCS | Performed by: NURSE PRACTITIONER

## 2019-10-29 PROCEDURE — G8482 FLU IMMUNIZE ORDER/ADMIN: HCPCS | Performed by: NURSE PRACTITIONER

## 2019-10-29 PROCEDURE — 90791 PSYCH DIAGNOSTIC EVALUATION: CPT | Performed by: PSYCHOLOGIST

## 2019-10-29 PROCEDURE — G8598 ASA/ANTIPLAT THER USED: HCPCS | Performed by: NURSE PRACTITIONER

## 2019-10-29 PROCEDURE — 3017F COLORECTAL CA SCREEN DOC REV: CPT | Performed by: NURSE PRACTITIONER

## 2019-10-29 PROCEDURE — 36415 COLL VENOUS BLD VENIPUNCTURE: CPT | Performed by: NURSE PRACTITIONER

## 2019-10-29 PROCEDURE — 1036F TOBACCO NON-USER: CPT | Performed by: NURSE PRACTITIONER

## 2019-10-29 PROCEDURE — 99213 OFFICE O/P EST LOW 20 MIN: CPT | Performed by: NURSE PRACTITIONER

## 2019-10-29 PROCEDURE — G8417 CALC BMI ABV UP PARAM F/U: HCPCS | Performed by: NURSE PRACTITIONER

## 2019-10-29 PROCEDURE — 1123F ACP DISCUSS/DSCN MKR DOCD: CPT | Performed by: NURSE PRACTITIONER

## 2019-10-29 ASSESSMENT — ENCOUNTER SYMPTOMS
WHEEZING: 0
TROUBLE SWALLOWING: 0
SHORTNESS OF BREATH: 0
COUGH: 0

## 2019-10-30 PROBLEM — F34.1 PERSISTENT DEPRESSIVE DISORDER WITH ANXIOUS DISTRESS, CURRENTLY MODERATE: Status: ACTIVE | Noted: 2019-10-30

## 2019-10-30 PROBLEM — F41.9 ANXIETY: Status: ACTIVE | Noted: 2019-10-30

## 2019-10-31 ENCOUNTER — TELEPHONE (OUTPATIENT)
Dept: INTERNAL MEDICINE CLINIC | Age: 69
End: 2019-10-31

## 2019-11-13 ENCOUNTER — TELEPHONE (OUTPATIENT)
Dept: INTERNAL MEDICINE CLINIC | Age: 69
End: 2019-11-13

## 2019-11-14 ENCOUNTER — TELEPHONE (OUTPATIENT)
Dept: INTERNAL MEDICINE CLINIC | Age: 69
End: 2019-11-14

## 2019-12-10 ENCOUNTER — HOSPITAL ENCOUNTER (OUTPATIENT)
Dept: OCCUPATIONAL THERAPY | Age: 69
Setting detail: THERAPIES SERIES
Discharge: HOME | End: 2019-12-10
Payer: MEDICARE

## 2019-12-10 ENCOUNTER — TELEPHONE (OUTPATIENT)
Dept: INTERNAL MEDICINE CLINIC | Age: 69
End: 2019-12-10

## 2019-12-10 PROCEDURE — 97537 COMMUNITY/WORK REINTEGRATION: CPT

## 2019-12-10 PROCEDURE — 97165 OT EVAL LOW COMPLEX 30 MIN: CPT

## 2019-12-29 DIAGNOSIS — Z79.4 CONTROLLED TYPE 2 DIABETES MELLITUS WITH COMPLICATION, WITH LONG-TERM CURRENT USE OF INSULIN (HCC): ICD-10-CM

## 2019-12-29 DIAGNOSIS — E11.8 CONTROLLED TYPE 2 DIABETES MELLITUS WITH COMPLICATION, WITH LONG-TERM CURRENT USE OF INSULIN (HCC): ICD-10-CM

## 2019-12-30 RX ORDER — ATORVASTATIN CALCIUM 10 MG/1
TABLET, FILM COATED ORAL
Qty: 90 TABLET | Refills: 0 | Status: SHIPPED | OUTPATIENT
Start: 2019-12-30 | End: 2020-05-04 | Stop reason: SDUPTHER

## 2020-01-13 ENCOUNTER — OFFICE VISIT (OUTPATIENT)
Dept: INTERNAL MEDICINE CLINIC | Age: 70
End: 2020-01-13
Payer: MEDICARE

## 2020-01-13 VITALS
DIASTOLIC BLOOD PRESSURE: 60 MMHG | WEIGHT: 244 LBS | RESPIRATION RATE: 16 BRPM | SYSTOLIC BLOOD PRESSURE: 102 MMHG | BODY MASS INDEX: 29.71 KG/M2 | HEIGHT: 76 IN

## 2020-01-13 LAB
ANION GAP SERPL CALCULATED.3IONS-SCNC: 15 MMOL/L (ref 3–16)
BILIRUBIN, POC: NORMAL
BLOOD URINE, POC: NORMAL
BUN BLDV-MCNC: 16 MG/DL (ref 7–20)
CALCIUM SERPL-MCNC: 9 MG/DL (ref 8.3–10.6)
CHLORIDE BLD-SCNC: 100 MMOL/L (ref 99–110)
CLARITY, POC: NORMAL
CO2: 24 MMOL/L (ref 21–32)
COLOR, POC: YELLOW
CREAT SERPL-MCNC: 0.9 MG/DL (ref 0.8–1.3)
GFR AFRICAN AMERICAN: >60
GFR NON-AFRICAN AMERICAN: >60
GLUCOSE BLD-MCNC: 228 MG/DL (ref 70–99)
GLUCOSE URINE, POC: NORMAL
HBA1C MFR BLD: 7.1 %
KETONES, POC: NORMAL
LEUKOCYTE EST, POC: NORMAL
NITRITE, POC: NORMAL
PH, POC: 6
POTASSIUM SERPL-SCNC: 4.9 MMOL/L (ref 3.5–5.1)
PROSTATE SPECIFIC ANTIGEN: 0.14 NG/ML (ref 0–4)
PROTEIN, POC: NORMAL
SODIUM BLD-SCNC: 139 MMOL/L (ref 136–145)
SPECIFIC GRAVITY, POC: 1.03
UROBILINOGEN, POC: 0.2

## 2020-01-13 PROCEDURE — 1123F ACP DISCUSS/DSCN MKR DOCD: CPT | Performed by: INTERNAL MEDICINE

## 2020-01-13 PROCEDURE — 4040F PNEUMOC VAC/ADMIN/RCVD: CPT | Performed by: INTERNAL MEDICINE

## 2020-01-13 PROCEDURE — 99213 OFFICE O/P EST LOW 20 MIN: CPT | Performed by: INTERNAL MEDICINE

## 2020-01-13 PROCEDURE — 2022F DILAT RTA XM EVC RTNOPTHY: CPT | Performed by: INTERNAL MEDICINE

## 2020-01-13 PROCEDURE — 3017F COLORECTAL CA SCREEN DOC REV: CPT | Performed by: INTERNAL MEDICINE

## 2020-01-13 PROCEDURE — 81002 URINALYSIS NONAUTO W/O SCOPE: CPT | Performed by: INTERNAL MEDICINE

## 2020-01-13 PROCEDURE — G8417 CALC BMI ABV UP PARAM F/U: HCPCS | Performed by: INTERNAL MEDICINE

## 2020-01-13 PROCEDURE — 36415 COLL VENOUS BLD VENIPUNCTURE: CPT | Performed by: INTERNAL MEDICINE

## 2020-01-13 PROCEDURE — 83036 HEMOGLOBIN GLYCOSYLATED A1C: CPT | Performed by: INTERNAL MEDICINE

## 2020-01-13 PROCEDURE — G8482 FLU IMMUNIZE ORDER/ADMIN: HCPCS | Performed by: INTERNAL MEDICINE

## 2020-01-13 PROCEDURE — 1036F TOBACCO NON-USER: CPT | Performed by: INTERNAL MEDICINE

## 2020-01-13 PROCEDURE — G8427 DOCREV CUR MEDS BY ELIG CLIN: HCPCS | Performed by: INTERNAL MEDICINE

## 2020-01-13 RX ORDER — SULFAMETHOXAZOLE AND TRIMETHOPRIM 800; 160 MG/1; MG/1
1 TABLET ORAL 2 TIMES DAILY
Qty: 10 TABLET | Refills: 0 | Status: SHIPPED | OUTPATIENT
Start: 2020-01-13 | End: 2020-01-18

## 2020-01-13 ASSESSMENT — ENCOUNTER SYMPTOMS: RESPIRATORY NEGATIVE: 1

## 2020-01-13 NOTE — PROGRESS NOTES
Subjective:      Patient ID: Trecia Brittle is a 78 y.o. male. HPI   Macho Albright is here with an increase in urinary frequency and especially nocturia (10-12 times). No dysuria. Feels that he empties his bladder. He has a previous UTI's. His blood sugars have not been too bad. He has also had an occasional fall at night when up to the bathroom. Not sure why or if any syncope involved. Review of Systems   Respiratory: Negative. Genitourinary: Positive for frequency and urgency. Negative for dysuria. Some hesitantcy. Neurological: Positive for numbness. Negative for dizziness. Objective:   Physical Exam  Constitutional:       Appearance: Normal appearance. Cardiovascular:      Rate and Rhythm: Normal rate. Rhythm irregular. Pulmonary:      Effort: Pulmonary effort is normal.      Breath sounds: Normal breath sounds. Abdominal:      General: Abdomen is flat. Palpations: Abdomen is soft. There is no mass. Tenderness: There is no tenderness. Musculoskeletal:         General: No swelling. Neurological:      Mental Status: He is alert. Assessment:      1. Urinary frequency    2. Controlled type 2 diabetes mellitus with complication, with long-term current use of insulin ([facility]            Plan:      Yohannes Carter was seen today for urinary frequency and fatigue. Diagnoses and all orders for this visit:    Urinary frequency  -     Basic Metabolic Panel  -     PSA, Prostatic Specific Antigen  -     URINE CULTURE  -     POCT Urinalysis no Micro - small leukocytes; High SG  -     sulfamethoxazole-trimethoprim (BACTRIM DS;SEPTRA DS) 800-160 MG per tablet; Take 1 tablet by mouth 2 times daily for 5 days    Controlled type 2 diabetes mellitus with complication, with long-term current use of insulin ([facility])  -     POCT glycosylated hemoglobin (Hb A1C) - 7.1.                     Nick León MD

## 2020-01-15 LAB
ORGANISM: ABNORMAL
URINE CULTURE, ROUTINE: ABNORMAL
URINE CULTURE, ROUTINE: ABNORMAL

## 2020-01-23 ENCOUNTER — TELEPHONE (OUTPATIENT)
Dept: INTERNAL MEDICINE CLINIC | Age: 70
End: 2020-01-23

## 2020-01-23 NOTE — TELEPHONE ENCOUNTER
Called and spoke with Nery Johnson will come tomorrow and  specimen cup for UA. Referral info given for Dr Brooke Chávez. Nery Johnson will call and schedule ASAP. Notified would like her to get this evaluated prior to leaving for [place].

## 2020-01-24 ENCOUNTER — NURSE ONLY (OUTPATIENT)
Dept: INTERNAL MEDICINE CLINIC | Age: 70
End: 2020-01-24

## 2020-01-24 LAB
BILIRUBIN URINE: NEGATIVE
BILIRUBIN, POC: NORMAL
BLOOD URINE, POC: NORMAL
BLOOD, URINE: NEGATIVE
CLARITY, POC: NORMAL
CLARITY: CLEAR
COLOR, POC: YELLOW
COLOR: YELLOW
EPITHELIAL CELLS, UA: 2 /HPF (ref 0–5)
GLUCOSE URINE, POC: NORMAL
GLUCOSE URINE: 100 MG/DL
HYALINE CASTS: 1 /LPF (ref 0–8)
KETONES, POC: NORMAL
KETONES, URINE: NEGATIVE MG/DL
LEUKOCYTE EST, POC: NORMAL
LEUKOCYTE ESTERASE, URINE: ABNORMAL
MICROSCOPIC EXAMINATION: YES
NITRITE, POC: NORMAL
NITRITE, URINE: NEGATIVE
PH UA: 6 (ref 5–8)
PH, POC: 5.5
PROTEIN UA: NEGATIVE MG/DL
PROTEIN, POC: NORMAL
RBC UA: 5 /HPF (ref 0–4)
SPECIFIC GRAVITY UA: 1.02 (ref 1–1.03)
SPECIFIC GRAVITY, POC: 1.02
URINE TYPE: ABNORMAL
UROBILINOGEN, POC: 0.2
UROBILINOGEN, URINE: 0.2 E.U./DL
WBC UA: 8 /HPF (ref 0–5)

## 2020-01-24 PROCEDURE — 81002 URINALYSIS NONAUTO W/O SCOPE: CPT | Performed by: NURSE PRACTITIONER

## 2020-01-24 RX ORDER — CEFDINIR 300 MG/1
300 CAPSULE ORAL 2 TIMES DAILY
Qty: 14 CAPSULE | Refills: 0 | Status: SHIPPED | OUTPATIENT
Start: 2020-01-24 | End: 2020-01-31

## 2020-01-25 LAB — URINE CULTURE, ROUTINE: NORMAL

## 2020-01-30 RX ORDER — FLASH GLUCOSE SENSOR
1 KIT MISCELLANEOUS CONTINUOUS
Qty: 2 EACH | Refills: 5 | Status: SHIPPED | OUTPATIENT
Start: 2020-01-30

## 2020-01-30 RX ORDER — FLASH GLUCOSE SCANNING READER
1 EACH MISCELLANEOUS CONTINUOUS
Qty: 1 DEVICE | Refills: 0 | Status: SHIPPED | OUTPATIENT
Start: 2020-01-30

## 2020-04-20 RX ORDER — DONEPEZIL HYDROCHLORIDE 5 MG/1
TABLET, FILM COATED ORAL
Qty: 30 TABLET | Refills: 0 | Status: SHIPPED | OUTPATIENT
Start: 2020-04-20 | End: 2020-06-01

## 2020-05-05 RX ORDER — ATORVASTATIN CALCIUM 10 MG/1
TABLET, FILM COATED ORAL
Qty: 90 TABLET | Refills: 1 | Status: SHIPPED | OUTPATIENT
Start: 2020-05-05

## 2020-05-18 ENCOUNTER — TELEPHONE (OUTPATIENT)
Dept: INTERNAL MEDICINE CLINIC | Age: 70
End: 2020-05-18

## 2020-05-18 ENCOUNTER — VIRTUAL VISIT (OUTPATIENT)
Dept: INTERNAL MEDICINE CLINIC | Age: 70
End: 2020-05-18
Payer: MEDICARE

## 2020-05-18 VITALS — WEIGHT: 224 LBS | BODY MASS INDEX: 27.27 KG/M2

## 2020-05-18 PROBLEM — N39.0 SEPSIS DUE TO URINARY TRACT INFECTION (HCC): Status: RESOLVED | Noted: 2018-01-05 | Resolved: 2020-05-18

## 2020-05-18 PROBLEM — E11.621 DIABETIC ULCER OF LEFT GREAT TOE (HCC): Status: RESOLVED | Noted: 2018-11-19 | Resolved: 2020-05-18

## 2020-05-18 PROBLEM — E83.42 HYPOMAGNESEMIA: Status: RESOLVED | Noted: 2018-05-23 | Resolved: 2020-05-18

## 2020-05-18 PROBLEM — L97.429 DIABETIC ULCER OF LEFT MIDFOOT ASSOCIATED WITH TYPE 2 DIABETES MELLITUS (HCC): Status: RESOLVED | Noted: 2018-11-19 | Resolved: 2020-05-18

## 2020-05-18 PROBLEM — A41.9 SEPSIS DUE TO URINARY TRACT INFECTION (HCC): Status: RESOLVED | Noted: 2018-01-05 | Resolved: 2020-05-18

## 2020-05-18 PROBLEM — L97.529 DIABETIC ULCER OF LEFT GREAT TOE (HCC): Status: RESOLVED | Noted: 2018-11-19 | Resolved: 2020-05-18

## 2020-05-18 PROBLEM — E11.621 DIABETIC ULCER OF LEFT MIDFOOT ASSOCIATED WITH TYPE 2 DIABETES MELLITUS (HCC): Status: RESOLVED | Noted: 2018-11-19 | Resolved: 2020-05-18

## 2020-05-18 PROCEDURE — G8417 CALC BMI ABV UP PARAM F/U: HCPCS | Performed by: NURSE PRACTITIONER

## 2020-05-18 PROCEDURE — 1123F ACP DISCUSS/DSCN MKR DOCD: CPT | Performed by: NURSE PRACTITIONER

## 2020-05-18 PROCEDURE — 1036F TOBACCO NON-USER: CPT | Performed by: NURSE PRACTITIONER

## 2020-05-18 PROCEDURE — 4040F PNEUMOC VAC/ADMIN/RCVD: CPT | Performed by: NURSE PRACTITIONER

## 2020-05-18 PROCEDURE — 3017F COLORECTAL CA SCREEN DOC REV: CPT | Performed by: NURSE PRACTITIONER

## 2020-05-18 PROCEDURE — 99214 OFFICE O/P EST MOD 30 MIN: CPT | Performed by: NURSE PRACTITIONER

## 2020-05-18 PROCEDURE — G8428 CUR MEDS NOT DOCUMENT: HCPCS | Performed by: NURSE PRACTITIONER

## 2020-05-18 PROCEDURE — 3051F HG A1C>EQUAL 7.0%<8.0%: CPT | Performed by: NURSE PRACTITIONER

## 2020-05-18 PROCEDURE — 2022F DILAT RTA XM EVC RTNOPTHY: CPT | Performed by: NURSE PRACTITIONER

## 2020-05-18 NOTE — PROGRESS NOTES
2020    TELEHEALTH EVALUATION -- Audio/Visual (During PCZSK-78 public health emergency)    HPI:    Jil Britton (:  1950) has requested an audio/video evaluation for the following concern(s):Establish care     CC: T2DM, HTN ,HLD, BPH, Alzheimer/ cognitive declined    History obtained via patient and wife . T2DM: tolerating medications . Some lightheadedness at times when he gets up fast . Reports that BS this am 130 . He would sukhi a continuous monitor to check BS. Denies any tingling in feet just lost of feeling at times. Does see podiatry for diabetic shoes . See the eye doctor every 6 weeks for macular degeneration . Denies any known highs or lows blood sugars . Graf seat balance meals and walks for exercise. ACE was DC due to low BP . HTN: resolved . BP todau 125/50 with a wrist machine. Lisinsopril was stopped by Dr Harriet Galaviz due to low BP and lightheadness at times . Denies sob, chest pain,  syncope , LE edema. HLD:  States that he is tolerating medications well. Aware that he needs to lose weight and manage diet. BPH: followed by Dr Yeni Siegel . Takes flomax . + nocturia . Alzheimers: Wife has concerns over language and memory recall . Would like a referral to alzheimers foundations. Takig donepezil as directed . Walks and play pulzzles regularly . Eats a balance diet . Will see Dr Tomy Wei in July for a eval.     Review of Systems  ROS:   Consitutional: No fevers, chills, change in appetite , fatigue  Cardiac: NO chest pain , palpations, LE edema. Respiratory: NO SOB, cough. Sputum production or wheezing  . GI: NO abdominal pain , N/V, Diarrhea or constipation  : no urinary issues  Musculoskeletal: no joint swelling or arthralgia  Neuro: no syncope, dizziness, numbness  PYSCH: no anxiety or dysthymia, or  sleep disturbance.  + memory recall and congitive decline per wife . Prior to Visit Medications    Medication Sig Taking?  Authorizing Provider   atorvastatin (LIPITOR) 10 No Known Allergies    PHYSICAL EXAMINATION:  [ INSTRUCTIONS:  \"[x]\" Indicates a positive item  \"[]\" Indicates a negative item  -- DELETE ALL ITEMS NOT EXAMINED]  Vital Signs: (As obtained by patient/caregiver or practitioner observation)    Blood pressure- 125/50 (wrist machine - per wife) Heart rate- 51     Temperature- 97      Constitutional: [x] Appears well-developed and well-nourished [x] No apparent distress        Mental status  [x] Alert and awake  [x] Oriented to person/place ( did not ask time)  [x]Able to follow commands      Eyes:  EOM    [x]  Normal    Sclera  [x]  Normal           Discharge [x]  None visible    HENT:   [x] Normocephalic, atraumatic. External Ears [x] Normal           Pulmonary/Chest: [x] Respiratory effort normal.  [x] No visualized signs of difficulty breathing or respiratory distress       Musculoskeletal:        [x] Normal range of motion of neck           Neurological:        [x] No Facial Asymmetry (Cranial nerve 7 motor function) (limited exam to video visit)          [x] No gaze palsy           Skin:        [x] No significant exanthematous lesions or discoloration noted on facial skin               Psychiatric:       [x] Normal Affect [x] No Hallucinations        Other pertinent observable physical exam findings- color pink . Talkative and smiles during visit . ASSESSMENT/PLAN:    1. Controlled type 2 diabetes mellitus with complication, with long-term current use of insulin ([address] chronic   Currently taking   Metformin, levemir 20 units , glipizide  Reports compliance   No dosage changes , will monitor   Last a1c 7.1 % 1/13/20.     2. Essential hypertension, (resolved )   No medications . Lisinopril was stopped by Dr Brittni Lo due to slight noted lower BP readings . 3. Mixed hyperlipidemia  Currently taking  Lipitor   Reports compliance   No dosage changes , will monitor   Needs a fasting lipid panel next visit . 4.  BPH with obstruction/lower urinary tract symptoms  Followed and managed by Dr Lety Modi   On flomax . Doing well . 5. Early onset Alzheimer's dementia without behavioral disturbance (HCC)  On Aricept . No dosage changes   Will assess cognitive health with dr Anju Miramontes July 31 , 2020. Referral to be placed to Alzheimer's association for support . Instructed to continue balance meals , physical activity and brain games . Gave website of [employer] for games and puzzles. Return in about 3 months (around 8/18/2020) for DM check and fasting labs . Christina Duke is a 69 y.o. male being evaluated by a Virtual Visit (video visit) encounter to address concerns as mentioned above. A caregiver was present when appropriate. Due to this being a TeleHealth encounter ([facility]), evaluation of the following organ systems was limited: Vitals/Constitutional/EENT/Resp/CV/GI//MS/Neuro/Skin/Heme-Lymph-Imm. Pursuant to the emergency declaration under the [address] [place] [address], [address] [place] [address] authority and the [employer] and [employer] Act, this Virtual Visit was conducted with patient's (and/or legal guardian's) consent, to reduce the patient's risk of exposure to COVID-19 and provide necessary medical care. The patient (and/or legal guardian) has also been advised to contact this office for worsening conditions or problems, and seek emergency medical treatment and/or call 911 if deemed necessary. Patient identification was verified at the start of the visit: Yes    Total time spent on this encounter: Not billed by time    Services were provided through a video synchronous discussion virtually to substitute for in-person clinic visit. Patient and provider were located at their individual homes. --Tom Keyes, APRN - CNP on 5/18/2020 at 3:56 PM    An electronic signature was used to authenticate this note.

## 2020-05-18 NOTE — TELEPHONE ENCOUNTER
Called Alzheimer association referral program online is not available. Attempted to call hotline unable to do verbal referral. Program noted to give phone number to family and will be able to assist them.

## 2020-05-19 ENCOUNTER — TELEPHONE (OUTPATIENT)
Dept: INTERNAL MEDICINE CLINIC | Age: 70
End: 2020-05-19

## 2020-06-01 RX ORDER — DONEPEZIL HYDROCHLORIDE 5 MG/1
TABLET, FILM COATED ORAL
Qty: 30 TABLET | Refills: 2 | Status: SHIPPED | OUTPATIENT
Start: 2020-06-01 | End: 2020-09-08

## 2020-06-01 NOTE — TELEPHONE ENCOUNTER
Refill request for aricept last filled 4/20/20   Last ov 5/18/20                                               Next ov not scheduled

## 2020-06-22 RX ORDER — GLIPIZIDE 5 MG/1
TABLET ORAL
Qty: 360 TABLET | Refills: 1 | Status: SHIPPED | OUTPATIENT
Start: 2020-06-22

## 2020-06-26 ENCOUNTER — TELEPHONE (OUTPATIENT)
Dept: INTERNAL MEDICINE CLINIC | Age: 70
End: 2020-06-26

## 2020-07-06 ENCOUNTER — TELEPHONE (OUTPATIENT)
Dept: INTERNAL MEDICINE CLINIC | Age: 70
End: 2020-07-06

## 2020-07-06 ENCOUNTER — OFFICE VISIT (OUTPATIENT)
Dept: INTERNAL MEDICINE CLINIC | Age: 70
End: 2020-07-06
Payer: MEDICARE

## 2020-07-06 VITALS
WEIGHT: 223 LBS | RESPIRATION RATE: 16 BRPM | TEMPERATURE: 97.6 F | BODY MASS INDEX: 27.16 KG/M2 | DIASTOLIC BLOOD PRESSURE: 64 MMHG | SYSTOLIC BLOOD PRESSURE: 90 MMHG | HEART RATE: 59 BPM | HEIGHT: 76 IN | OXYGEN SATURATION: 99 %

## 2020-07-06 LAB — HBA1C MFR BLD: 6.8 %

## 2020-07-06 PROCEDURE — 99214 OFFICE O/P EST MOD 30 MIN: CPT | Performed by: NURSE PRACTITIONER

## 2020-07-06 PROCEDURE — 1123F ACP DISCUSS/DSCN MKR DOCD: CPT | Performed by: NURSE PRACTITIONER

## 2020-07-06 PROCEDURE — G8427 DOCREV CUR MEDS BY ELIG CLIN: HCPCS | Performed by: NURSE PRACTITIONER

## 2020-07-06 PROCEDURE — 1036F TOBACCO NON-USER: CPT | Performed by: NURSE PRACTITIONER

## 2020-07-06 PROCEDURE — 2022F DILAT RTA XM EVC RTNOPTHY: CPT | Performed by: NURSE PRACTITIONER

## 2020-07-06 PROCEDURE — 3017F COLORECTAL CA SCREEN DOC REV: CPT | Performed by: NURSE PRACTITIONER

## 2020-07-06 PROCEDURE — G8417 CALC BMI ABV UP PARAM F/U: HCPCS | Performed by: NURSE PRACTITIONER

## 2020-07-06 PROCEDURE — 3044F HG A1C LEVEL LT 7.0%: CPT | Performed by: NURSE PRACTITIONER

## 2020-07-06 PROCEDURE — 36415 COLL VENOUS BLD VENIPUNCTURE: CPT | Performed by: NURSE PRACTITIONER

## 2020-07-06 PROCEDURE — 4040F PNEUMOC VAC/ADMIN/RCVD: CPT | Performed by: NURSE PRACTITIONER

## 2020-07-06 PROCEDURE — 83036 HEMOGLOBIN GLYCOSYLATED A1C: CPT | Performed by: NURSE PRACTITIONER

## 2020-07-06 SDOH — ECONOMIC STABILITY: INCOME INSECURITY: HOW HARD IS IT FOR YOU TO PAY FOR THE VERY BASICS LIKE FOOD, HOUSING, MEDICAL CARE, AND HEATING?: NOT HARD AT ALL

## 2020-07-06 SDOH — ECONOMIC STABILITY: FOOD INSECURITY: WITHIN THE PAST 12 MONTHS, YOU WORRIED THAT YOUR FOOD WOULD RUN OUT BEFORE YOU GOT MONEY TO BUY MORE.: NEVER TRUE

## 2020-07-06 SDOH — ECONOMIC STABILITY: TRANSPORTATION INSECURITY
IN THE PAST 12 MONTHS, HAS LACK OF TRANSPORTATION KEPT YOU FROM MEETINGS, WORK, OR FROM GETTING THINGS NEEDED FOR DAILY LIVING?: NO

## 2020-07-06 SDOH — ECONOMIC STABILITY: FOOD INSECURITY: WITHIN THE PAST 12 MONTHS, THE FOOD YOU BOUGHT JUST DIDN'T LAST AND YOU DIDN'T HAVE MONEY TO GET MORE.: NEVER TRUE

## 2020-07-06 SDOH — ECONOMIC STABILITY: TRANSPORTATION INSECURITY
IN THE PAST 12 MONTHS, HAS THE LACK OF TRANSPORTATION KEPT YOU FROM MEDICAL APPOINTMENTS OR FROM GETTING MEDICATIONS?: NO

## 2020-07-06 ASSESSMENT — ENCOUNTER SYMPTOMS
ABDOMINAL PAIN: 0
COUGH: 0
CHEST TIGHTNESS: 0
NAUSEA: 0
SHORTNESS OF BREATH: 1
CHOKING: 0
VOMITING: 0
TROUBLE SWALLOWING: 0
WHEEZING: 0

## 2020-07-06 NOTE — PROGRESS NOTES
2020     Boom Rubio (:  1950) is a 69 y.o. male, here for evaluation of the following medical concerns:    Chief Complaint   Patient presents with    Anxiety    Headache    Shortness of Breath    Weight Loss    Memory Loss       HPI     Anxiety: Wife states and patient agrees that he feels anxious about losing dome memory and does not like to be at home alone. Wife feels that his anxiety is worsening. They are interested in starting medications and see psychology . They have seen dr Kiera Cool in the past .     Alzheimers/memory loss: memory loss is worsening and he is having a hard time expressing himself at times. Wife has reached out to Alzheimer association but with covid19 there are no meeting . Suggested a one on one or zoom . Due for a cognitive testing with dr Anai Dorado  . Taking aricept as prescribed. SOB: wife reports that patient  has been having some SOB with activity . In the last month he has had fainting like spells when out in the heat or working outside . She contributes to not drinking enough fluids and mowing grass. He has not fallen or injured himself . Denies  chest pain , sob at rest , dizziness, or palpations . He did see Dr Juan Pablo Fry in march . Instructed to call him for an appt . Agreed to a stress echo to be ordered. Headaches : None specific head pain to left parietal area that are intermittent , these have been going on for years . His wife states that he is having them daily in the am at times then goes 2 days without one . He holds his head and c/o pain . She gives him 2 extra strength tylenol for pain that helps . He denies any vision changes , gait issues , vomiting , nausea with headaches . Weight loss: Wife states that patient has lost close to 22 lbs since march . He is eating well , needs to drink more fluid . DM2: Would like to get a freestyle demian glucometer but it keeps getting denied. IT would be easier to take glucose .  She states that they do not check it every day but it is never high or low when they do . He is tolerating  medications. She agrees to check for regularly . He is eating well and active . HLD:  States that he is tolerating medications well. Aware that he needs to  manage diet. Review of Systems   Constitutional: Positive for fatigue and unexpected weight change. Negative for appetite change, chills and fever. HENT: Negative for trouble swallowing. Eyes: Negative for visual disturbance. Respiratory: Positive for shortness of breath. Negative for cough, choking, chest tightness and wheezing. Cardiovascular: Negative for chest pain, palpitations and leg swelling. Gastrointestinal: Negative for abdominal pain, nausea and vomiting. Musculoskeletal: Negative for gait problem and myalgias. Neurological: Positive for headaches. Negative for dizziness, weakness, light-headedness and numbness. Psychiatric/Behavioral: Negative for sleep disturbance. The patient is nervous/anxious. Memory declined        Prior to Visit Medications    Medication Sig Taking?  Authorizing Provider   glipiZIDE (GLUCOTROL) 5 MG tablet TAKE TWO TABLETS BY MOUTH TWICE A DAY Yes TAINA Randolph CNP   donepezil (ARICEPT) 5 MG tablet TAKE ONE TABLET BY MOUTH EVERY NIGHT AT BEDTIME Yes TAINA Randolph CNP   atorvastatin (LIPITOR) 10 MG tablet Take 1 tablet by mouth every evening Yes TAINA Ku CNP   metFORMIN (GLUCOPHAGE) 850 MG tablet TAKE ONE TABLET BY MOUTH TWICE A DAY WITH MEALS Yes TAINA Randolph CNP   Insulin Pen Needle 31G X 8 MM MISC 1 each by Does not apply route daily Yes TAINA Kemp CNP   Continuous Blood Gluc  (FREESTYLE SHILOH 14 DAY READER) ANITRA 1 Device by Does not apply route continuous Yes Chika Beauchamp MD   Continuous Blood Gluc Sensor (FREESTYLE SHILOH 14 DAY SENSOR) MISC 1 each by Does not apply route continuous Yes Chika Beauchamp MD   insulin detemir (LEVEMIR FLEXTOUCH) 100 UNIT/ML injection pen Inject 20 Units into the skin nightly Yes Isadora Mccord MD   ofloxacin (OCUFLOX) 0.3 % solution Inject 1 drop into the eye Yes Historical Provider, MD   ONE TOUCH ULTRA TEST strip USE TO TEST ONCE DAILY Yes Birtha Alpers, MD   ELIQUIS 5 MG TABS tablet TAKE ONE TABLET BY MOUTH TWICE A DAY  Patient taking differently: Take 5 mg by mouth daily  Yes Johann De La Torre MD   Acetaminophen (TYLENOL PO) Take by mouth Yes Historical Provider, MD Ralf Horn USE TO TEST ONCE DAILY Yes Birtha Alpers, MD   glucose blood VI test strips (ONE TOUCH ULTRA TEST) strip USE TO TEST ONCE DAILY  E11.9 Yes Birtha Alpers, MD   B-D ULTRAFINE III SHORT PEN 31G X 8 MM MISC USE ONCE DAILY WITH LEVEMIR FLEX PEN Yes Birtha Alpers, MD   ACCU-CHEK BILLY strip TEST TWO TIMES A DAY Yes Birtha Alpers, MD        Social History     Tobacco Use    Smoking status: Former Smoker     Packs/day: 1.00     Years: 13.00     Pack years: 13.00     Types: Cigarettes     Last attempt to quit: 1981     Years since quittin.9    Smokeless tobacco: Never Used   Substance Use Topics    Alcohol use: Yes     Comment: rare        Vitals:    20 1127   BP: 90/64   Site: Left Upper Arm   Position: Sitting   Cuff Size: Medium Adult   Pulse: 59   Resp: 16   Temp: 97.6 °F (36.4 °C)   TempSrc: Oral   SpO2: 99%   Weight: 223 lb (101.2 kg)   Height: 6' 4\" (1.93 m)     Estimated body mass index is 27.14 kg/m² as calculated from the following:    Height as of this encounter: 6' 4\" (1.93 m). Weight as of this encounter: 223 lb (101.2 kg). Physical Exam  Vitals signs reviewed. Constitutional:       General: He is not in acute distress. Appearance: Normal appearance. He is not ill-appearing, toxic-appearing or diaphoretic. HENT:      Head: Normocephalic and atraumatic.       Nose: Nose normal.      Mouth/Throat:      Mouth: Mucous membranes are moist.   Eyes:      General: Right eye: No discharge. Left eye: No discharge. Extraocular Movements: Extraocular movements intact. Conjunctiva/sclera: Conjunctivae normal.      Pupils: Pupils are equal, round, and reactive to light. Neck:      Musculoskeletal: Normal range of motion and neck supple. No neck rigidity. Vascular: No carotid bruit. Cardiovascular:      Rate and Rhythm: Normal rate and regular rhythm. Pulses: Normal pulses. Radial pulses are 2+ on the right side and 2+ on the left side. Heart sounds: Normal heart sounds. No murmur. Pulmonary:      Effort: Pulmonary effort is normal. No respiratory distress. Breath sounds: Normal breath sounds. Abdominal:      General: Abdomen is flat. Bowel sounds are normal. There is no distension or abdominal bruit. Palpations: Abdomen is soft. There is no hepatomegaly, splenomegaly or mass. Tenderness: There is no abdominal tenderness. Musculoskeletal: Normal range of motion. Right lower leg: No edema. Left lower leg: No edema. Lymphadenopathy:      Cervical: No cervical adenopathy. Skin:     General: Skin is warm and dry. Capillary Refill: Capillary refill takes less than 2 seconds. Coloration: Skin is not pale. Neurological:      General: No focal deficit present. Mental Status: He is alert and oriented to person, place, and time. Cranial Nerves: Cranial nerves are intact. No cranial nerve deficit or facial asymmetry. Sensory: Sensation is intact. Motor: No weakness or abnormal muscle tone. Coordination: Coordination is intact. Coordination normal.      Gait: Gait is intact. Gait normal.      Deep Tendon Reflexes:      Reflex Scores:       Patellar reflexes are 2+ on the right side and 2+ on the left side. Comments: No rash , boggy, bruising or tenderness to the scalp noted . No step off or abnormal bony prominences.     Psychiatric:         Attention and Perception: Attention normal.         Mood and Affect: Mood normal.         Speech: Speech normal.         Behavior: Behavior normal.         Thought Content: Thought content normal.         Cognition and Memory: Cognition is impaired. Judgment: Judgment normal.         ASSESSMENT/PLAN:  1. Early onset Alzheimer's dementia without behavioral disturbance ([facility]  Currently taking  Aricept   Reports compliance   No dosage changes , will monitor   - TSH with Reflex; Future  Scheduled for cognitive testing with dr Amarjit Cole July 31     2. Persistent depressive disorder with anxious distress, currently moderate  Discussed starting celexa 5 mg after labs are back   Ref to psychology- joe      3. Controlled type 2 diabetes mellitus with complication, with long-term current use of insulin ([facility]  Results for POC orders placed in visit on 07/06/20   POCT glycosylated hemoglobin (Hb A1C)   Result Value Ref Range    Hemoglobin A1C 6.8 %   Currently taking  Glipizide, metformin , levemir   Reports compliance   No dosage changes , will monitor   - POCT glycosylated hemoglobin (Hb A1C)    - Comprehensive Metabolic Panel; Future    5. Mixed hyperlipidemia  Currently taking  lipitor   Reports compliance   No dosage changes , will monitor   - Lipid Panel; Future    6. Shortness of breath    - ECHO Stress Test; Future    7. Nonintractable headache, unspecified chronicity pattern, unspecified headache type    - MRI Brain WO Contrast; Future    8. Screening for deficiency anemia    - CBC Auto Differential; Future    9. Weight loss    - Sedimentation Rate; Future  - C-Reactive Protein; Future    10. At high risk for falls  On eliquis for CV infarction  Wife denies falls or head injury     If you develop worsening or concerning symptoms ( fevers, SOB, difficulty breathing , chest pain , etc)   go to the ER ASAP    Return in about 4 weeks (around 8/3/2020). All questions addresses and answered . Patient agrees with plan of care.      An Render Post-Care Instructions In Note?: no Detail Level: Detailed Duration Of Freeze Thaw-Cycle (Seconds): 5 Post-Care Instructions: I reviewed with the patient in detail post-care instructions. Patient is to wear sunprotection, and avoid picking at any of the treated lesions. Pt may apply Vaseline to crusted or scabbing areas. Consent: The patient's consent was obtained including but not limited to risks of crusting, scabbing, blistering, scarring, darker or lighter pigmentary change, recurrence, incomplete removal and infection. Number Of Freeze-Thaw Cycles: 1 freeze-thaw cycle

## 2020-07-07 LAB
A/G RATIO: 1.7 (ref 1.1–2.2)
ALBUMIN SERPL-MCNC: 4.3 G/DL (ref 3.4–5)
ALP BLD-CCNC: 76 U/L (ref 40–129)
ALT SERPL-CCNC: 14 U/L (ref 10–40)
ANION GAP SERPL CALCULATED.3IONS-SCNC: 12 MMOL/L (ref 3–16)
AST SERPL-CCNC: 17 U/L (ref 15–37)
BILIRUB SERPL-MCNC: 1.3 MG/DL (ref 0–1)
BUN BLDV-MCNC: 17 MG/DL (ref 7–20)
C-REACTIVE PROTEIN: 0.7 MG/L (ref 0–5.1)
CALCIUM SERPL-MCNC: 9.4 MG/DL (ref 8.3–10.6)
CHLORIDE BLD-SCNC: 101 MMOL/L (ref 99–110)
CHOLESTEROL, TOTAL: 102 MG/DL (ref 0–199)
CO2: 28 MMOL/L (ref 21–32)
CREAT SERPL-MCNC: 0.9 MG/DL (ref 0.8–1.3)
GFR AFRICAN AMERICAN: >60
GFR NON-AFRICAN AMERICAN: >60
GLOBULIN: 2.5 G/DL
GLUCOSE BLD-MCNC: 136 MG/DL (ref 70–99)
HDLC SERPL-MCNC: 44 MG/DL (ref 40–60)
LDL CHOLESTEROL CALCULATED: 47 MG/DL
POTASSIUM SERPL-SCNC: 4.8 MMOL/L (ref 3.5–5.1)
SODIUM BLD-SCNC: 141 MMOL/L (ref 136–145)
TOTAL PROTEIN: 6.8 G/DL (ref 6.4–8.2)
TRIGL SERPL-MCNC: 56 MG/DL (ref 0–150)
TSH REFLEX: 1.13 UIU/ML (ref 0.27–4.2)
VLDLC SERPL CALC-MCNC: 11 MG/DL

## 2020-07-08 ENCOUNTER — TELEPHONE (OUTPATIENT)
Dept: INTERNAL MEDICINE CLINIC | Age: 70
End: 2020-07-08

## 2020-07-08 LAB
REASON FOR REJECTION: NORMAL
REJECTED TEST: NORMAL

## 2020-07-08 RX ORDER — SERTRALINE HYDROCHLORIDE 25 MG/1
25 TABLET, FILM COATED ORAL DAILY
Qty: 90 TABLET | Refills: 1 | Status: SHIPPED | OUTPATIENT
Start: 2020-07-08

## 2020-07-08 NOTE — TELEPHONE ENCOUNTER
Called [facility] and notified that a script for zoloft will be sent to pharmacy for anxiety. Note lab appt for tomorrow.

## 2020-07-09 ENCOUNTER — NURSE ONLY (OUTPATIENT)
Dept: INTERNAL MEDICINE CLINIC | Age: 70
End: 2020-07-09
Payer: MEDICARE

## 2020-07-09 LAB
BASOPHILS ABSOLUTE: 0.1 K/UL (ref 0–0.2)
BASOPHILS RELATIVE PERCENT: 0.8 %
EOSINOPHILS ABSOLUTE: 0.1 K/UL (ref 0–0.6)
EOSINOPHILS RELATIVE PERCENT: 1.3 %
HCT VFR BLD CALC: 38.3 % (ref 40.5–52.5)
HEMOGLOBIN: 12.7 G/DL (ref 13.5–17.5)
LYMPHOCYTES ABSOLUTE: 0.8 K/UL (ref 1–5.1)
LYMPHOCYTES RELATIVE PERCENT: 10.7 %
MCH RBC QN AUTO: 30.4 PG (ref 26–34)
MCHC RBC AUTO-ENTMCNC: 33.3 G/DL (ref 31–36)
MCV RBC AUTO: 91.2 FL (ref 80–100)
MONOCYTES ABSOLUTE: 0.8 K/UL (ref 0–1.3)
MONOCYTES RELATIVE PERCENT: 10.3 %
NEUTROPHILS ABSOLUTE: 6 K/UL (ref 1.7–7.7)
NEUTROPHILS RELATIVE PERCENT: 76.9 %
PDW BLD-RTO: 13.8 % (ref 12.4–15.4)
PLATELET # BLD: 292 K/UL (ref 135–450)
PMV BLD AUTO: 9.2 FL (ref 5–10.5)
RBC # BLD: 4.2 M/UL (ref 4.2–5.9)
SEDIMENTATION RATE, ERYTHROCYTE: 26 MM/HR (ref 0–20)
WBC # BLD: 7.7 K/UL (ref 4–11)

## 2020-07-09 PROCEDURE — 36415 COLL VENOUS BLD VENIPUNCTURE: CPT | Performed by: NURSE PRACTITIONER

## 2020-07-22 ENCOUNTER — TELEPHONE (OUTPATIENT)
Dept: INTERNAL MEDICINE CLINIC | Age: 70
End: 2020-07-22

## 2020-07-22 NOTE — TELEPHONE ENCOUNTER
Called [facility] they need either e-scribed or hard copy sent to pharmacy. Can prepare for signature tomorrow.  If ok please advise

## 2020-07-27 NOTE — TELEPHONE ENCOUNTER
Last office visit:07/06/2020    Future Appointments   Date Time Provider Flor Almendarez   8/20/2020 10:30 AM SCHEDULE, [facility] STRESS ROOM 2 [facility] STRESS [facility]   8/20/2020  1:00 PM [facility] 2 [facility]   9/3/2020 11:00 AM Malachi Fields, [address]

## 2020-08-12 RX ORDER — INSULIN DETEMIR 100 [IU]/ML
INJECTION, SOLUTION SUBCUTANEOUS
Qty: 5 PEN | Refills: 0 | Status: SHIPPED | OUTPATIENT
Start: 2020-08-12 | End: 2020-08-24

## 2020-08-12 NOTE — TELEPHONE ENCOUNTER
Request kellee last filled 12/10/19           Last ov 7/6/20                                         Next ov 9/3/20

## 2020-08-19 RX ORDER — PEN NEEDLE, DIABETIC 31 GX5/16"
NEEDLE, DISPOSABLE MISCELLANEOUS
Qty: 100 EACH | Refills: 1 | Status: SHIPPED | OUTPATIENT
Start: 2020-08-19

## 2020-08-19 NOTE — TELEPHONE ENCOUNTER
Last office visit:07/06/2020    Future Appointments   Date Time Provider Flor Almendarez   8/20/2020 10:30 AM SCHEDULE, [facility] STRESS ROOM 2 [facility] STRESS [facility]   8/20/2020  1:00 PM [facility] 2 [facility]   9/3/2020 11:00 AM Rafael Wheat, [address]

## 2020-09-08 RX ORDER — DONEPEZIL HYDROCHLORIDE 5 MG/1
TABLET, FILM COATED ORAL
Qty: 90 TABLET | Refills: 0 | Status: SHIPPED | OUTPATIENT
Start: 2020-09-08

## 2021-03-26 ENCOUNTER — TELEPHONE (OUTPATIENT)
Dept: INTERNAL MEDICINE CLINIC | Age: 71
End: 2021-03-26

## 2021-04-01 DIAGNOSIS — Z79.4 CONTROLLED TYPE 2 DIABETES MELLITUS WITH COMPLICATION, WITH LONG-TERM CURRENT USE OF INSULIN (HCC): ICD-10-CM

## 2021-04-01 DIAGNOSIS — E11.8 CONTROLLED TYPE 2 DIABETES MELLITUS WITH COMPLICATION, WITH LONG-TERM CURRENT USE OF INSULIN (HCC): ICD-10-CM

## 2021-04-01 RX ORDER — PEN NEEDLE, DIABETIC 31 GX5/16"
NEEDLE, DISPOSABLE MISCELLANEOUS
Qty: 100 EACH | OUTPATIENT
Start: 2021-04-01

## 2021-07-20 DIAGNOSIS — Z79.4 CONTROLLED TYPE 2 DIABETES MELLITUS WITH COMPLICATION, WITH LONG-TERM CURRENT USE OF INSULIN (HCC): ICD-10-CM

## 2021-07-20 DIAGNOSIS — E11.8 CONTROLLED TYPE 2 DIABETES MELLITUS WITH COMPLICATION, WITH LONG-TERM CURRENT USE OF INSULIN (HCC): ICD-10-CM

## 2021-07-20 RX ORDER — PEN NEEDLE, DIABETIC 31 GX5/16"
NEEDLE, DISPOSABLE MISCELLANEOUS
Qty: 100 EACH | Refills: 0 | OUTPATIENT
Start: 2021-07-20

## 2022-11-28 ENCOUNTER — HOSPITAL ENCOUNTER (INPATIENT)
Age: 72
LOS: 10 days | Discharge: NURSING, REHAB OR LONG-TERM CARE | End: 2022-12-09
Attending: STUDENT IN AN ORGANIZED HEALTH CARE EDUCATION/TRAINING PROGRAM
Payer: MEDICARE

## 2022-11-28 DIAGNOSIS — R33.8 ACUTE URINARY RETENTION: ICD-10-CM

## 2022-11-28 DIAGNOSIS — G93.40 ACUTE ENCEPHALOPATHY: Primary | ICD-10-CM

## 2022-11-28 DIAGNOSIS — N17.9 AKI (ACUTE KIDNEY INJURY) (HCC): ICD-10-CM

## 2022-11-28 DIAGNOSIS — R79.89 ELEVATED PROCALCITONIN: ICD-10-CM

## 2022-11-28 PROCEDURE — 99285 EMERGENCY DEPT VISIT HI MDM: CPT

## 2022-11-28 PROCEDURE — 51798 US URINE CAPACITY MEASURE: CPT

## 2022-11-28 PROCEDURE — 96365 THER/PROPH/DIAG IV INF INIT: CPT

## 2022-11-28 PROCEDURE — 96366 THER/PROPH/DIAG IV INF ADDON: CPT

## 2022-11-28 PROCEDURE — 96361 HYDRATE IV INFUSION ADD-ON: CPT

## 2022-11-28 ASSESSMENT — PAIN SCALES - GENERAL: PAINLEVEL_OUTOF10: 5

## 2022-11-28 ASSESSMENT — PAIN - FUNCTIONAL ASSESSMENT: PAIN_FUNCTIONAL_ASSESSMENT: 0-10

## 2022-11-29 ENCOUNTER — APPOINTMENT (OUTPATIENT)
Dept: CT IMAGING | Age: 72
End: 2022-11-29
Payer: MEDICARE

## 2022-11-29 ENCOUNTER — APPOINTMENT (OUTPATIENT)
Dept: GENERAL RADIOLOGY | Age: 72
End: 2022-11-29
Payer: MEDICARE

## 2022-11-29 PROBLEM — N17.9 AKI (ACUTE KIDNEY INJURY) (HCC): Status: ACTIVE | Noted: 2022-11-29

## 2022-11-29 LAB
A/G RATIO: 1 (ref 1.1–2.2)
ALBUMIN SERPL-MCNC: 3.8 G/DL (ref 3.4–5)
ALP BLD-CCNC: 86 U/L (ref 40–129)
ALT SERPL-CCNC: 14 U/L (ref 10–40)
ANION GAP SERPL CALCULATED.3IONS-SCNC: 13 MMOL/L (ref 3–16)
AST SERPL-CCNC: 28 U/L (ref 15–37)
BACTERIA: ABNORMAL /HPF
BASOPHILS ABSOLUTE: 0.1 K/UL (ref 0–0.2)
BASOPHILS RELATIVE PERCENT: 0.3 %
BILIRUB SERPL-MCNC: 1.3 MG/DL (ref 0–1)
BILIRUBIN URINE: NEGATIVE
BLOOD, URINE: ABNORMAL
BUN BLDV-MCNC: 42 MG/DL (ref 7–20)
CALCIUM SERPL-MCNC: 9.2 MG/DL (ref 8.3–10.6)
CHLORIDE BLD-SCNC: 99 MMOL/L (ref 99–110)
CLARITY: ABNORMAL
CO2: 26 MMOL/L (ref 21–32)
COLOR: YELLOW
CREAT SERPL-MCNC: 1.6 MG/DL (ref 0.8–1.3)
EKG ATRIAL RATE: 67 BPM
EKG DIAGNOSIS: NORMAL
EKG P-R INTERVAL: 152 MS
EKG Q-T INTERVAL: 446 MS
EKG QRS DURATION: 102 MS
EKG QTC CALCULATION (BAZETT): 471 MS
EKG R AXIS: -19 DEGREES
EKG T AXIS: -22 DEGREES
EKG VENTRICULAR RATE: 67 BPM
EOSINOPHILS ABSOLUTE: 0 K/UL (ref 0–0.6)
EOSINOPHILS RELATIVE PERCENT: 0 %
ESTIMATED AVERAGE GLUCOSE: 157.1 MG/DL
GFR SERPL CREATININE-BSD FRML MDRD: 45 ML/MIN/{1.73_M2}
GLUCOSE BLD-MCNC: 130 MG/DL (ref 70–99)
GLUCOSE BLD-MCNC: 147 MG/DL (ref 70–99)
GLUCOSE BLD-MCNC: 148 MG/DL (ref 70–99)
GLUCOSE BLD-MCNC: 160 MG/DL (ref 70–99)
GLUCOSE BLD-MCNC: 164 MG/DL (ref 70–99)
GLUCOSE URINE: NEGATIVE MG/DL
HBA1C MFR BLD: 7.1 %
HCT VFR BLD CALC: 35.9 % (ref 40.5–52.5)
HEMOGLOBIN: 12 G/DL (ref 13.5–17.5)
KETONES, URINE: ABNORMAL MG/DL
LACTIC ACID, SEPSIS: 1.5 MMOL/L (ref 0.4–1.9)
LEUKOCYTE ESTERASE, URINE: ABNORMAL
LYMPHOCYTES ABSOLUTE: 0.6 K/UL (ref 1–5.1)
LYMPHOCYTES RELATIVE PERCENT: 3 %
MCH RBC QN AUTO: 29.5 PG (ref 26–34)
MCHC RBC AUTO-ENTMCNC: 33.6 G/DL (ref 31–36)
MCV RBC AUTO: 88 FL (ref 80–100)
MICROSCOPIC EXAMINATION: YES
MONOCYTES ABSOLUTE: 1.8 K/UL (ref 0–1.3)
MONOCYTES RELATIVE PERCENT: 8.4 %
NEUTROPHILS ABSOLUTE: 18.9 K/UL (ref 1.7–7.7)
NEUTROPHILS RELATIVE PERCENT: 88.3 %
NITRITE, URINE: NEGATIVE
PDW BLD-RTO: 14.8 % (ref 12.4–15.4)
PERFORMED ON: ABNORMAL
PH UA: 6.5 (ref 5–8)
PLATELET # BLD: 405 K/UL (ref 135–450)
PMV BLD AUTO: 7.4 FL (ref 5–10.5)
POTASSIUM REFLEX MAGNESIUM: 4.3 MMOL/L (ref 3.5–5.1)
PROCALCITONIN: 13.51 NG/ML (ref 0–0.15)
PROCALCITONIN: 25.64 NG/ML (ref 0–0.15)
PROTEIN UA: 30 MG/DL
RBC # BLD: 4.08 M/UL (ref 4.2–5.9)
RBC UA: ABNORMAL /HPF (ref 0–4)
SODIUM BLD-SCNC: 138 MMOL/L (ref 136–145)
SPECIFIC GRAVITY UA: 1.02 (ref 1–1.03)
TOTAL PROTEIN: 7.5 G/DL (ref 6.4–8.2)
TROPONIN: 0.02 NG/ML
TROPONIN: 0.02 NG/ML
TROPONIN: 0.03 NG/ML
TROPONIN: 0.04 NG/ML
URINE REFLEX TO CULTURE: YES
URINE TYPE: ABNORMAL
UROBILINOGEN, URINE: 0.2 E.U./DL
WBC # BLD: 21.4 K/UL (ref 4–11)
WBC UA: ABNORMAL /HPF (ref 0–5)

## 2022-11-29 PROCEDURE — 2580000003 HC RX 258: Performed by: STUDENT IN AN ORGANIZED HEALTH CARE EDUCATION/TRAINING PROGRAM

## 2022-11-29 PROCEDURE — 2580000003 HC RX 258: Performed by: NURSE PRACTITIONER

## 2022-11-29 PROCEDURE — 83605 ASSAY OF LACTIC ACID: CPT

## 2022-11-29 PROCEDURE — 83036 HEMOGLOBIN GLYCOSYLATED A1C: CPT

## 2022-11-29 PROCEDURE — 2580000003 HC RX 258: Performed by: INTERNAL MEDICINE

## 2022-11-29 PROCEDURE — 93010 ELECTROCARDIOGRAM REPORT: CPT | Performed by: INTERNAL MEDICINE

## 2022-11-29 PROCEDURE — 84484 ASSAY OF TROPONIN QUANT: CPT

## 2022-11-29 PROCEDURE — 81001 URINALYSIS AUTO W/SCOPE: CPT

## 2022-11-29 PROCEDURE — 72125 CT NECK SPINE W/O DYE: CPT

## 2022-11-29 PROCEDURE — 87040 BLOOD CULTURE FOR BACTERIA: CPT

## 2022-11-29 PROCEDURE — 6370000000 HC RX 637 (ALT 250 FOR IP): Performed by: NURSE PRACTITIONER

## 2022-11-29 PROCEDURE — 85025 COMPLETE CBC W/AUTO DIFF WBC: CPT

## 2022-11-29 PROCEDURE — 84145 PROCALCITONIN (PCT): CPT

## 2022-11-29 PROCEDURE — 80053 COMPREHEN METABOLIC PANEL: CPT

## 2022-11-29 PROCEDURE — 51702 INSERT TEMP BLADDER CATH: CPT

## 2022-11-29 PROCEDURE — 93005 ELECTROCARDIOGRAM TRACING: CPT | Performed by: STUDENT IN AN ORGANIZED HEALTH CARE EDUCATION/TRAINING PROGRAM

## 2022-11-29 PROCEDURE — 6360000002 HC RX W HCPCS: Performed by: STUDENT IN AN ORGANIZED HEALTH CARE EDUCATION/TRAINING PROGRAM

## 2022-11-29 PROCEDURE — 70450 CT HEAD/BRAIN W/O DYE: CPT

## 2022-11-29 PROCEDURE — 6370000000 HC RX 637 (ALT 250 FOR IP): Performed by: INTERNAL MEDICINE

## 2022-11-29 PROCEDURE — 1200000000 HC SEMI PRIVATE

## 2022-11-29 PROCEDURE — 71045 X-RAY EXAM CHEST 1 VIEW: CPT

## 2022-11-29 PROCEDURE — 87186 SC STD MICRODIL/AGAR DIL: CPT

## 2022-11-29 PROCEDURE — 87077 CULTURE AEROBIC IDENTIFY: CPT

## 2022-11-29 PROCEDURE — 6360000002 HC RX W HCPCS: Performed by: INTERNAL MEDICINE

## 2022-11-29 PROCEDURE — 6360000002 HC RX W HCPCS: Performed by: NURSE PRACTITIONER

## 2022-11-29 PROCEDURE — 36415 COLL VENOUS BLD VENIPUNCTURE: CPT

## 2022-11-29 PROCEDURE — 87086 URINE CULTURE/COLONY COUNT: CPT

## 2022-11-29 RX ORDER — DONEPEZIL HYDROCHLORIDE 5 MG/1
5 TABLET, FILM COATED ORAL NIGHTLY
Status: DISCONTINUED | OUTPATIENT
Start: 2022-11-29 | End: 2022-11-30

## 2022-11-29 RX ORDER — ACETAMINOPHEN 325 MG/1
650 TABLET ORAL EVERY 6 HOURS PRN
Status: DISCONTINUED | OUTPATIENT
Start: 2022-11-29 | End: 2022-12-09 | Stop reason: HOSPADM

## 2022-11-29 RX ORDER — INSULIN LISPRO 100 [IU]/ML
0-4 INJECTION, SOLUTION INTRAVENOUS; SUBCUTANEOUS NIGHTLY
Status: DISCONTINUED | OUTPATIENT
Start: 2022-11-29 | End: 2022-12-09 | Stop reason: HOSPADM

## 2022-11-29 RX ORDER — QUETIAPINE FUMARATE 25 MG/1
25 TABLET, FILM COATED ORAL
COMMUNITY

## 2022-11-29 RX ORDER — DIVALPROEX SODIUM 125 MG/1
125 CAPSULE, COATED PELLETS ORAL EVERY 12 HOURS SCHEDULED
Status: DISCONTINUED | OUTPATIENT
Start: 2022-11-29 | End: 2022-12-09 | Stop reason: HOSPADM

## 2022-11-29 RX ORDER — DIVALPROEX SODIUM 125 MG/1
125 CAPSULE, COATED PELLETS ORAL 2 TIMES DAILY
Status: ON HOLD | COMMUNITY
End: 2022-11-30 | Stop reason: CLARIF

## 2022-11-29 RX ORDER — ACETAMINOPHEN 650 MG/1
650 SUPPOSITORY RECTAL EVERY 6 HOURS PRN
Status: DISCONTINUED | OUTPATIENT
Start: 2022-11-29 | End: 2022-12-09 | Stop reason: HOSPADM

## 2022-11-29 RX ORDER — SODIUM CHLORIDE 0.9 % (FLUSH) 0.9 %
5-40 SYRINGE (ML) INJECTION PRN
Status: DISCONTINUED | OUTPATIENT
Start: 2022-11-29 | End: 2022-12-09 | Stop reason: HOSPADM

## 2022-11-29 RX ORDER — DEXTROSE MONOHYDRATE 100 MG/ML
INJECTION, SOLUTION INTRAVENOUS CONTINUOUS PRN
Status: DISCONTINUED | OUTPATIENT
Start: 2022-11-29 | End: 2022-12-09 | Stop reason: HOSPADM

## 2022-11-29 RX ORDER — INSULIN LISPRO 100 [IU]/ML
0-8 INJECTION, SOLUTION INTRAVENOUS; SUBCUTANEOUS
Status: DISCONTINUED | OUTPATIENT
Start: 2022-11-29 | End: 2022-12-09 | Stop reason: HOSPADM

## 2022-11-29 RX ORDER — ONDANSETRON 2 MG/ML
4 INJECTION INTRAMUSCULAR; INTRAVENOUS EVERY 6 HOURS PRN
Status: DISCONTINUED | OUTPATIENT
Start: 2022-11-29 | End: 2022-12-09 | Stop reason: HOSPADM

## 2022-11-29 RX ORDER — ONDANSETRON 4 MG/1
4 TABLET, ORALLY DISINTEGRATING ORAL EVERY 8 HOURS PRN
Status: DISCONTINUED | OUTPATIENT
Start: 2022-11-29 | End: 2022-12-09 | Stop reason: HOSPADM

## 2022-11-29 RX ORDER — SODIUM CHLORIDE, SODIUM LACTATE, POTASSIUM CHLORIDE, AND CALCIUM CHLORIDE .6; .31; .03; .02 G/100ML; G/100ML; G/100ML; G/100ML
1650 INJECTION, SOLUTION INTRAVENOUS ONCE
Status: COMPLETED | OUTPATIENT
Start: 2022-11-29 | End: 2022-11-29

## 2022-11-29 RX ORDER — SODIUM CHLORIDE 9 MG/ML
INJECTION, SOLUTION INTRAVENOUS CONTINUOUS
Status: DISCONTINUED | OUTPATIENT
Start: 2022-11-29 | End: 2022-12-04

## 2022-11-29 RX ORDER — LANOLIN ALCOHOL/MO/W.PET/CERES
10 CREAM (GRAM) TOPICAL DAILY
Status: ON HOLD | COMMUNITY
End: 2022-11-30 | Stop reason: CLARIF

## 2022-11-29 RX ORDER — SODIUM CHLORIDE 0.9 % (FLUSH) 0.9 %
5-40 SYRINGE (ML) INJECTION EVERY 12 HOURS SCHEDULED
Status: DISCONTINUED | OUTPATIENT
Start: 2022-11-29 | End: 2022-12-09 | Stop reason: HOSPADM

## 2022-11-29 RX ORDER — ATORVASTATIN CALCIUM 10 MG/1
10 TABLET, FILM COATED ORAL NIGHTLY
Status: DISCONTINUED | OUTPATIENT
Start: 2022-11-29 | End: 2022-12-09 | Stop reason: HOSPADM

## 2022-11-29 RX ORDER — QUETIAPINE FUMARATE 25 MG/1
25 TABLET, FILM COATED ORAL
Status: DISCONTINUED | OUTPATIENT
Start: 2022-11-29 | End: 2022-11-30

## 2022-11-29 RX ORDER — SODIUM CHLORIDE 9 MG/ML
INJECTION, SOLUTION INTRAVENOUS PRN
Status: DISCONTINUED | OUTPATIENT
Start: 2022-11-29 | End: 2022-12-09 | Stop reason: HOSPADM

## 2022-11-29 RX ORDER — INSULIN GLARGINE 100 [IU]/ML
5 INJECTION, SOLUTION SUBCUTANEOUS NIGHTLY
Status: DISCONTINUED | OUTPATIENT
Start: 2022-11-29 | End: 2022-12-09 | Stop reason: HOSPADM

## 2022-11-29 RX ORDER — ZIPRASIDONE MESYLATE 20 MG/ML
10 INJECTION, POWDER, LYOPHILIZED, FOR SOLUTION INTRAMUSCULAR ONCE
Status: COMPLETED | OUTPATIENT
Start: 2022-11-29 | End: 2022-11-29

## 2022-11-29 RX ORDER — ZIPRASIDONE MESYLATE 20 MG/ML
10 INJECTION, POWDER, LYOPHILIZED, FOR SOLUTION INTRAMUSCULAR EVERY 8 HOURS PRN
Status: DISCONTINUED | OUTPATIENT
Start: 2022-11-29 | End: 2022-12-09 | Stop reason: HOSPADM

## 2022-11-29 RX ORDER — ZIPRASIDONE MESYLATE 20 MG/ML
20 INJECTION, POWDER, LYOPHILIZED, FOR SOLUTION INTRAMUSCULAR ONCE
Status: COMPLETED | OUTPATIENT
Start: 2022-11-29 | End: 2022-11-29

## 2022-11-29 RX ORDER — SODIUM CHLORIDE, SODIUM LACTATE, POTASSIUM CHLORIDE, AND CALCIUM CHLORIDE .6; .31; .03; .02 G/100ML; G/100ML; G/100ML; G/100ML
1000 INJECTION, SOLUTION INTRAVENOUS ONCE
Status: COMPLETED | OUTPATIENT
Start: 2022-11-29 | End: 2022-11-29

## 2022-11-29 RX ADMIN — ZIPRASIDONE MESYLATE 10 MG: 20 INJECTION, POWDER, LYOPHILIZED, FOR SOLUTION INTRAMUSCULAR at 09:02

## 2022-11-29 RX ADMIN — ATORVASTATIN CALCIUM 10 MG: 10 TABLET, FILM COATED ORAL at 21:30

## 2022-11-29 RX ADMIN — ZIPRASIDONE MESYLATE 20 MG: 20 INJECTION, POWDER, LYOPHILIZED, FOR SOLUTION INTRAMUSCULAR at 14:00

## 2022-11-29 RX ADMIN — APIXABAN 5 MG: 5 TABLET, FILM COATED ORAL at 14:00

## 2022-11-29 RX ADMIN — SODIUM CHLORIDE, POTASSIUM CHLORIDE, SODIUM LACTATE AND CALCIUM CHLORIDE 1650 ML: 600; 310; 30; 20 INJECTION, SOLUTION INTRAVENOUS at 02:33

## 2022-11-29 RX ADMIN — ZIPRASIDONE MESYLATE 10 MG: 20 INJECTION, POWDER, LYOPHILIZED, FOR SOLUTION INTRAMUSCULAR at 18:44

## 2022-11-29 RX ADMIN — CEFEPIME 2000 MG: 2 INJECTION, POWDER, FOR SOLUTION INTRAVENOUS at 23:14

## 2022-11-29 RX ADMIN — VANCOMYCIN HYDROCHLORIDE 1500 MG: 10 INJECTION, POWDER, LYOPHILIZED, FOR SOLUTION INTRAVENOUS at 02:54

## 2022-11-29 RX ADMIN — INSULIN GLARGINE 5 UNITS: 100 INJECTION, SOLUTION SUBCUTANEOUS at 21:33

## 2022-11-29 RX ADMIN — CEFEPIME 2000 MG: 2 INJECTION, POWDER, FOR SOLUTION INTRAVENOUS at 02:35

## 2022-11-29 RX ADMIN — DIVALPROEX SODIUM 125 MG: 125 CAPSULE, COATED PELLETS ORAL at 17:27

## 2022-11-29 RX ADMIN — APIXABAN 5 MG: 5 TABLET, FILM COATED ORAL at 21:30

## 2022-11-29 RX ADMIN — SODIUM CHLORIDE: 9 INJECTION, SOLUTION INTRAVENOUS at 07:35

## 2022-11-29 RX ADMIN — QUETIAPINE FUMARATE 25 MG: 25 TABLET ORAL at 17:27

## 2022-11-29 RX ADMIN — SODIUM CHLORIDE, POTASSIUM CHLORIDE, SODIUM LACTATE AND CALCIUM CHLORIDE 1000 ML: 600; 310; 30; 20 INJECTION, SOLUTION INTRAVENOUS at 00:41

## 2022-11-29 RX ADMIN — VANCOMYCIN HYDROCHLORIDE 1250 MG: 10 INJECTION, POWDER, LYOPHILIZED, FOR SOLUTION INTRAVENOUS at 16:30

## 2022-11-29 RX ADMIN — DONEPEZIL HYDROCHLORIDE 5 MG: 5 TABLET, FILM COATED ORAL at 21:30

## 2022-11-29 RX ADMIN — SODIUM CHLORIDE, PRESERVATIVE FREE 10 ML: 5 INJECTION INTRAVENOUS at 21:31

## 2022-11-29 RX ADMIN — CEFEPIME 2000 MG: 2 INJECTION, POWDER, FOR SOLUTION INTRAVENOUS at 11:38

## 2022-11-29 RX ADMIN — SODIUM CHLORIDE: 9 INJECTION, SOLUTION INTRAVENOUS at 19:55

## 2022-11-29 NOTE — ED PROVIDER NOTES
[facility]  ED  EMERGENCY DEPARTMENT ENCOUNTER      Pt Name: Cecil Kamara  MRN: [id number]  [place] 1950  Date of evaluation: 11/28/2022  Provider: Carmine Cooper MD    CHIEF COMPLAINT       Chief Complaint   Patient presents with    Fall     Pt having multiple falls, found by his nightstand with it flipped over. Per staff pt acting different. Pt is in memory care at [facility]. Severe dementia. Laceration to back of head      Fall, altered mental status    HISTORY OF PRESENT ILLNESS   (Location/Symptom, Timing/Onset,Context/Setting, Quality, Duration, Modifying Factors, Severity)  Note limiting factors. Cecil Kamara is a 67 y.o. male who presents to the ED with a chief complaint of fall out of bed at his SNF, found on the ground by his nightstand. Abrasion to the back of his head. Not acting like himself for several days according to SNF staff and family at bedside. Described as increased confusion, somnolence. Pt denies pain. History otherwise unobtainable 2/2 dementia and AMS. NursingNotes were reviewed. REVIEW OF SYSTEMS    (2-9 systems for level 4, 10 or more for level 5)     Review of Systems   Unable to perform ROS: Dementia   Psychiatric/Behavioral:  Positive for confusion.        PAST MEDICAL HISTORY     Past Medical History:   Diagnosis Date    Acute cystitis without hematuria     Arthritis     Cerebellar infarct [facility])     Cerebral artery occlusion with cerebral infarction ([address]     2013  DIZZY  COULDN'T WALK   RESOLVED    Diabetes mellitus ([address]     Diabetic nephropathy with proteinuria ([address]     Diabetic peripheral neuropathy ([address]     Diabetic ulcer of left great toe ([address] 11/19/2018    Diabetic ulcer of left midfoot associated with type 2 diabetes mellitus ([address] 11/19/2018    Disease of blood and blood forming organ     Dizziness     Hyperlipidemia     Hypertension     Hypomagnesemia 5/23/2018    Moderate non-proliferative diabetic retinopathy (HCC) Movement disorder     Sepsis due to urinary tract infection ([address] 1/5/2018         SURGICALHISTORY       Past Surgical History:   Procedure Laterality Date    CATARACT REMOVAL  5/7/19 (L) 5/14/19 (R)    COLONOSCOPY      ENDOSCOPY, COLON, DIAGNOSTIC      FOOT SURGERY Right 2011    Ulcer surgery    HYPOSPADIAS CORRECTION      OTHER SURGICAL HISTORY Left 12/20/2016    INCISION AND DRAINAGE LEFT HALLUX         PILONIDAL CYST EXCISION      TONSILLECTOMY      UPPER GASTROINTESTINAL ENDOSCOPY  11/16/2017    Dr Herzog Both       Previous Medications    ACCU-CHEK BILLY STRIP    TEST TWO TIMES A DAY    ACETAMINOPHEN (TYLENOL PO)    Take by mouth    ATORVASTATIN (LIPITOR) 10 MG TABLET    Take 1 tablet by mouth every evening    B-D ULTRAFINE III SHORT PEN 31G X 8 MM MISC    USE ONCE DAILY WITH LEVEMIR FLEX PEN    B-D ULTRAFINE III SHORT PEN 31G X 8 MM MISC    USE DAILY    CONTINUOUS BLOOD GLUC  (FREESTYLE SHILOH 14 DAY READER) ANITRA    1 Device by Does not apply route continuous    CONTINUOUS BLOOD GLUC SENSOR (FREESTYLE SHILOH 14 DAY SENSOR) MISC    1 each by Does not apply route continuous    DONEPEZIL (ARICEPT) 5 MG TABLET    TAKE ONE TABLET BY MOUTH EVERY NIGHT AT BEDTIME    ELIQUIS 5 MG TABS TABLET    TAKE ONE TABLET BY MOUTH TWICE A DAY    GLIPIZIDE (GLUCOTROL) 5 MG TABLET    TAKE TWO TABLETS BY MOUTH TWICE A DAY    GLUCOSE BLOOD VI TEST STRIPS (ONE TOUCH ULTRA TEST) STRIP    USE TO TEST ONCE DAILY  E11.9    LEVEMIR FLEXTOUCH 100 UNIT/ML INJECTION PEN    INJECT 20 UNITS UNDER THE SKIN NIGHTLY    METFORMIN (GLUCOPHAGE) 850 MG TABLET    TAKE ONE TABLET BY MOUTH TWICE A DAY WITH MEALS    OFLOXACIN (OCUFLOX) 0.3 % SOLUTION    Inject 1 drop into the eye    ONE TOUCH ULTRA TEST STRIP    USE TO TEST ONCE DAILY    ONETOUCH DELICA LANCETS 42Q MISC    USE TO TEST ONCE DAILY    SERTRALINE (ZOLOFT) 25 MG TABLET    Take 1 tablet by mouth daily       ALLERGIES     Patient has no known allergies.     FAMILY HISTORY       Family History   Problem Relation Age of Onset    Cancer Mother         Cancer    Cancer Father         Bone cancer    No Known Problems Sister     Cancer Sister         Lung cancer    No Known Problems Brother     No Known Problems Maternal Aunt     No Known Problems Maternal Uncle     No Known Problems Paternal Aunt     No Known Problems Paternal Uncle     No Known Problems Maternal Grandmother     No Known Problems Maternal Grandfather     No Known Problems Paternal Grandmother     No Known Problems Paternal Grandfather     No Known Problems Other     Anesth Problems Neg Hx     Broken Bones Neg Hx     Clotting Disorder Neg Hx     Collagen Disease Neg Hx     Diabetes Neg Hx     Dislocations Neg Hx     Osteoporosis Neg Hx     Rheumatologic Disease Neg Hx     Scoliosis Neg Hx     Severe Sprains Neg Hx           SOCIAL HISTORY       Social History     Socioeconomic History    Marital status:      Spouse name: Elizabeth Waddell    Number of children: 2    Years of education: 25    Highest education level: Master's degree (e.g., MA, MS, Maya, MEd, MSW, IBRAHIMA)   Occupational History    Occupation: retired   Tobacco Use    Smoking status: Former     Packs/day: 1.00     Years: 13.00     Pack years: 13.00     Types: Cigarettes     Quit date: 1981     Years since quittin.3    Smokeless tobacco: Never   Vaping Use    Vaping Use: Never used   Substance and Sexual Activity    Alcohol use: Yes     Comment: rare    Drug use: No       SCREENINGS    Santiago Coma Scale  Eye Opening: Spontaneous  Best Verbal Response: Oriented  Best Motor Response: Obeys commands  Santiago Coma Scale Score: 15        PHYSICAL EXAM    (up to 7 for level 4, 8 or more for level 5)     ED Triage Vitals   BP Temp Temp Source Heart Rate Resp SpO2 Height Weight   22 2344 22 2344 22 2344 22 2344 22 2344 22 2344 22 2342 22 2342   (!) 94/53 98.1 °F (36.7 °C) Oral 66 16 99 % 6' 4\" (1.93 m) 223 lb (101.2 kg)       General: Alert and oriented to self only, No acute distress. Eye: Normal conjunctiva. Sclera anicteric. HENT: Oral mucosa is moist.  Posterior L scalp abrasion. Hemostatic with direct pressure. Respiratory: Respirations even and non-labored. CTAB. Cardiovascular: Normal rate, irregular rhythm. Intact peripheral pulses. No edema. Gastrointestinal: Soft, Non-tender, Non-distended. : hypospadias, no penile ttp, no testicular ttp or scrotal erythema. Musculoskeletal: No swelling. Integumentary: Warm, Dry. Neurologic: Alert and appropriate for age. No focal deficits. Psychiatric: Cooperative. DIAGNOSTIC RESULTS     EKG: All EKG's are interpreted by the Emergency Department Physician who either signs or Co-signsthis chart in the absence of a cardiologist.        The Ekg interpreted by me shows  Rhythm NSR with PAC  Rate of 67 bpm  Axis is  normal  Intervals and durations are unremarkable. ST Segments: Nonspecific ST abnormality, no ST elevation. Compared to prior EKG dated October 10, 2019, no significant change. RADIOLOGY:   Non-plain filmimages such as CT, Ultrasound and MRI are read by the radiologist. Plain radiographic images are visualized and preliminarily interpreted by the emergency physician with the below findings:      Interpretation per the Radiologist below, if available at the time ofthis note:    CT CERVICAL SPINE WO CONTRAST   Final Result   No acute abnormality of the cervical spine. CT HEAD WO CONTRAST   Final Result   No acute intracranial abnormality. Moderate diffuse cerebral atrophy. Mild cerebral white matter chronic microvascular ischemic disease. Bilateral maxillary chronic sinusitis. XR CHEST PORTABLE   Final Result   No acute cardiopulmonary abnormality. Lung parenchymal changes suggestive of chronic interstitial lung disease.                LABS:  Labs Reviewed   CBC WITH AUTO DIFFERENTIAL - Abnormal; Notable for the following components:       Result Value    WBC 21.4 (*)     RBC 4.08 (*)     Hemoglobin 12.0 (*)     Hematocrit 35.9 (*)     Neutrophils Absolute 18.9 (*)     Lymphocytes Absolute 0.6 (*)     Monocytes Absolute 1.8 (*)     All other components within normal limits   COMPREHENSIVE METABOLIC PANEL W/ REFLEX TO MG FOR LOW K - Abnormal; Notable for the following components:    Glucose 147 (*)     BUN 42 (*)     Creatinine 1.6 (*)     Est, Glom Filt Rate 45 (*)     Albumin/Globulin Ratio 1.0 (*)     Total Bilirubin 1.3 (*)     All other components within normal limits   TROPONIN - Abnormal; Notable for the following components:    Troponin 0.04 (*)     All other components within normal limits   PROCALCITONIN - Abnormal; Notable for the following components:    Procalcitonin 25.64 (*)     All other components within normal limits   CULTURE, BLOOD 1   CULTURE, BLOOD 2   LACTATE, SEPSIS   URINALYSIS WITH REFLEX TO CULTURE   LACTATE, SEPSIS       All other labs were within normal range or not returned as of this dictation. EMERGENCY DEPARTMENT COURSE and DIFFERENTIAL DIAGNOSIS/MDM:   Vitals:    Vitals:    11/29/22 0224 11/29/22 0255 11/29/22 0311 11/29/22 0401   BP: (!) 105/51  106/65 97/63   Pulse: 69 73 65 65   Resp:       Temp:       TempSrc:       SpO2:  100% 97%    Weight:       Height:             Medical decision making:  Cantonaguila Hendricks in 14 is a 68 yo M with dementia, BPH with h/o FELIX, hypospadias, HLD, CHF who p/w AMS from baseline, fell out of bed at SNF, w/u for traumatic injury negative. Suspect UTI with obstruction, pt with bladder scan 430 mL. + MIYA. Has no spontaneous voiding in the ED, unable to catheterize 2/2 altered anatomy (pt's hypospadias. multiple attempts by myself and nursing). BUN 42/Cr 1.6 up from 17/0.9 baseline. Leukocytosis to 21.4 K procal 25.6 Trop 0.04, suspect 2/2 MIYA. [facility] for assistance with catheterization.      Dr. Rom Dobbins keep NPO, will eval this AM.  Admitted to the hospitalist, went up in stable condition. Reviewed bladder condition prior to transportation of the floor showed slight increase of approximately 50 mL from 430 to nearly 480 mL. Admitted, went up in stable condtion. Medications   vancomycin 1500 mg in dextrose 5% 300 mL IVPB (1,500 mg IntraVENous New Bag 11/29/22 0254)   cefepime (MAXIPIME) 2000 mg IVPB minibag (0 mg IntraVENous Stopped 11/29/22 0307)   lactated ringers bolus (0 mLs IntraVENous Stopped 11/29/22 0154)   lactated ringers bolus (0 mLs IntraVENous Stopped 11/29/22 0439)           CRITICAL CARE TIME   I personally saw the patient and independently provided 44 minutes of non-concurrent critical care out of the total shared critical care time provided, excluding separately reportable procedures. There was a high probability of clinically significant/life threatening deterioration in the patient's condition which required my urgent intervention. Is this patient to be included in the SEP-1 Core Measure due to severe sepsis or septic shock? No, 2+ SIRS criteria not met. Only meets leukocytosis. CONSULTS:  IP CONSULT TO UROLOGY      PROCEDURES:  Urethral Catheterization    Date/Time: 12/1/2022 8:15 AM  Performed by: Dominic Mccracken MD  Authorized by: Dominic Mccracken MD     Consent:     Consent obtained:  Verbal  Universal protocol:     Patient identity confirmed:  Hospital-assigned identification number and arm band  Pre-procedure details:     Procedure purpose:  Diagnostic  Procedure details:     Provider performed due to: Altered anatomy, complicated insertion and nurse unable to complete    Altered anatomy details: hypospadias. Catheter insertion:  Temporary indwelling    Catheter type: Valenzuela    Catheter size:  16 Fr    Number of attempts:  5 or more    Urine appearance: unable to assess as catheritization remained unsuccessful.   Post-procedure details:     Procedure completion:  Procedure terminated electively by provider FINAL IMPRESSION      1. Acute encephalopathy    2. MIYA (acute kidney injury) ([facility]    3. Elevated procalcitonin    4.  Acute urinary retention          DISPOSITION/PLAN   DISPOSITION Admitted 11/29/2022 04:39:16 AM      (Please note that portions of this note were completed with a voice recognition program.Efforts were made to edit the dictations but occasionally words are mis-transcribed.)    Sylvia Shaffer MD (electronically signed)  Attending Emergency Physician          Sylvia hSaffer MD  12/01/22 [id number]

## 2022-11-29 NOTE — ED NOTES
Report called to [facility] of [facility], all questions addressed. Pt aware of admission status with no questions verbalized.       Elizabeth Turk, RN  11/29/22 2874

## 2022-11-29 NOTE — CONSULTS
Consult placed    Who:ISAURA GREER  Date:11/29/2022,  Time:2:19 PM        Electronically signed by Sima Montiel on 11/29/2022 at 2:19 PM

## 2022-11-29 NOTE — CONSULTS
Urology Consult Note      Reason for Consultation: retention, hypospadias    Chief Complaint: Patient confused with underlying dementia, could not provide history     HPI:  Antoinette Javed is a 67 y.o. male with dementia and BPH who presented to [facility] ER with AMS and multiple falls. ER w/u revealed MIYA and leukocytosis. Patient was unable to provide a urine sample, bladder scan 477 mL. Catheterization attempted by ER staff but unsuccessful due to anatomy. Has seen urology group in the past, most recently 2020, was on Flomax for BPH. Most recently saw [facility] (3/2022) and was given myrbetriq for urinary frequency/nocturia. Current med list in epic does not list either myrbetriq or flomax.   Patient passing small amounts of urine spontaneously since arriving to hospital floor       Past Medical History:   Diagnosis Date    Acute cystitis without hematuria     Arthritis     Cerebellar infarct [facility])     Cerebral artery occlusion with cerebral infarction ([address]     2013  DIZZY  COULDN'T WALK   RESOLVED    Diabetes mellitus ([address]     Diabetic nephropathy with proteinuria ([address]     Diabetic peripheral neuropathy ([address]     Diabetic ulcer of left great toe ([address] 11/19/2018    Diabetic ulcer of left midfoot associated with type 2 diabetes mellitus ([address] 11/19/2018    Disease of blood and blood forming organ     Dizziness     Hyperlipidemia     Hypertension     Hypomagnesemia 5/23/2018    Moderate non-proliferative diabetic retinopathy ([address]     Movement disorder     Sepsis due to urinary tract infection ([address] 1/5/2018       Past Surgical History:   Procedure Laterality Date    CATARACT REMOVAL  5/7/19 (L) 5/14/19 (R)    COLONOSCOPY      ENDOSCOPY, COLON, DIAGNOSTIC      FOOT SURGERY Right 2011    Ulcer surgery    HYPOSPADIAS CORRECTION      OTHER SURGICAL HISTORY Left 12/20/2016    INCISION AND DRAINAGE LEFT HALLUX         PILONIDAL CYST EXCISION      TONSILLECTOMY      UPPER GASTROINTESTINAL ENDOSCOPY  11/16/2017     [facility]       Medication List reviewed:      Current Facility-Administered Medications   Medication Dose Route Frequency Provider Last Rate Last Admin    atorvastatin (LIPITOR) tablet 10 mg  10 mg Oral Nightly Dilip Boardman, APRN - CNP        donepezil (ARICEPT) tablet 5 mg  5 mg Oral Nightly Dilip Boardman, APRN - CNP        apixaban (ELIQUIS) tablet 5 mg  5 mg Oral Daily Dilip Boardman, APRN - CNP        insulin glargine (LANTUS) injection vial 5 Units  5 Units SubCUTAneous Nightly Dilip Boardman, APRN - CNP        sodium chloride flush 0.9 % injection 5-40 mL  5-40 mL IntraVENous 2 times per day Dilip Boardman, APRN - CNP        sodium chloride flush 0.9 % injection 5-40 mL  5-40 mL IntraVENous PRN Dilip Boardman, APRN - CNP        0.9 % sodium chloride infusion   IntraVENous PRN Dilip Boardman, APRN - CNP        ondansetron (ZOFRAN-ODT) disintegrating tablet 4 mg  4 mg Oral Q8H PRN Dilip Boardman, APRN - CNP        Or    ondansetron (ZOFRAN) injection 4 mg  4 mg IntraVENous Q6H PRN Dilip Boardman, APRN - CNP        acetaminophen (TYLENOL) tablet 650 mg  650 mg Oral Q6H PRN Dilip Boardman, APRN - CNP        Or    acetaminophen (TYLENOL) suppository 650 mg  650 mg Rectal Q6H PRN Dilip Boardman, APRN - CNP        cefepime (MAXIPIME) 2000 mg IVPB minibag  2,000 mg IntraVENous Q12H Dilip Boardman, APRN - CNP        0.9 % sodium chloride infusion   IntraVENous Continuous Dilip Boardman, APRN - CNP 75 mL/hr at 11/29/22 0735 New Bag at 11/29/22 0735    glucose chewable tablet 16 g  4 tablet Oral PRN Dilip Boardman, APRN - CNP        dextrose bolus 10% 125 mL  125 mL IntraVENous PRN Dilip Boardman, APRN - CNP        Or    dextrose bolus 10% 250 mL  250 mL IntraVENous PRN Dilip Boardman, APRN - CNP        glucagon (rDNA) injection 1 mg  1 mg SubCUTAneous PRN Dilip Boardman, APRN - CNP        dextrose 10 % infusion   IntraVENous Continuous PRN Dilip Boardman, APRN - CNP        insulin lispro (HUMALOG) injection vial 0-8 Units  0-8 Units SubCUTAneous TID WC TAINA Grossman - CNP        insulin lispro (HUMALOG) injection vial 0-4 Units  0-4 Units SubCUTAneous Nightly Ronnikvng Brewster, APRN - CNP        vancomycin (VANCOCIN) 1,250 mg in dextrose 5 % 250 mL IVPB  1,250 mg IntraVENous Q24H Manju Herron MD           No Known Allergies    Family History   Problem Relation Age of Onset    Cancer Mother         Cancer    Cancer Father         Bone cancer    No Known Problems Sister     Cancer Sister         Lung cancer    No Known Problems Brother     No Known Problems Maternal Aunt     No Known Problems Maternal Uncle     No Known Problems Paternal Aunt     No Known Problems Paternal Uncle     No Known Problems Maternal Grandmother     No Known Problems Maternal Grandfather     No Known Problems Paternal Grandmother     No Known Problems Paternal Grandfather     No Known Problems Other     Anesth Problems Neg Hx     Broken Bones Neg Hx     Clotting Disorder Neg Hx     Collagen Disease Neg Hx     Diabetes Neg Hx     Dislocations Neg Hx     Osteoporosis Neg Hx     Rheumatologic Disease Neg Hx     Scoliosis Neg Hx     Severe Sprains Neg Hx        Social History     Tobacco Use    Smoking status: Former     Packs/day: 1.00     Years: 13.00     Pack years: 13.00     Types: Cigarettes     Quit date: 1981     Years since quittin.3    Smokeless tobacco: Never   Vaping Use    Vaping Use: Never used   Substance Use Topics    Alcohol use: Yes     Comment: rare    Drug use: No         Review of Systems: A 12 point ROS was performed and was unremarkable unless listed in the history of present illness. No intake/output data recorded.     Vitals:  BP (!) 125/57   Pulse 80   Temp 98.7 °F (37.1 °C) (Oral)   Resp 16   Ht 6' 4\" (1.93 m)   Wt 223 lb (101.2 kg)   SpO2 100%   BMI 27.14 kg/m²   Temp  Av.4 °F (36.9 °C)  Min: 98.1 °F (36.7 °C)  Max: 98.7 °F (37.1 °C)  No intake or output data in the 24 hours ending 22 0737      Physical:  Well developed, well nourished in no acute distress  Mood indicates no abnormalities.  Pt doesnt appear depressed  Pleasantly confused, mildly anxious   Neck is supple, trachea is midline  Respiratory effort is normal  Cardiovascular show no extremity swelling  Abdomen no masses or hernias are palpated, there is no tenderness  Skin show no abnormal lesions     Male :  Penis appears normal and is circumcised  Urethral meatus is located on dorsal aspect of penis at mid shaft         Labs:  WBC:    Lab Results   Component Value Date/Time    WBC 21.4 11/29/2022 12:30 AM     Hemoglobin/Hematocrit:    Lab Results   Component Value Date/Time    HGB 12.0 11/29/2022 12:30 AM    HCT 35.9 11/29/2022 12:30 AM     BMP:    Lab Results   Component Value Date/Time     11/29/2022 12:30 AM    K 4.3 11/29/2022 12:30 AM    CL 99 11/29/2022 12:30 AM    CO2 26 11/29/2022 12:30 AM    BUN 42 11/29/2022 12:30 AM    LABALBU 3.8 11/29/2022 12:30 AM    CREATININE 1.6 11/29/2022 12:30 AM    CALCIUM 9.2 11/29/2022 12:30 AM    GFRAA >60 07/06/2020 12:27 PM    GFRAA >60 04/30/2013 11:15 AM    LABGLOM 45 11/29/2022 12:30 AM     PT/INR:    Lab Results   Component Value Date/Time    PROTIME 12.3 01/05/2018 04:38 AM    INR 1.09 01/05/2018 04:38 AM     PTT:    Lab Results   Component Value Date/Time    APTT 27.5 01/05/2018 04:38 AM   [APTT    Urinalysis: PENDING     Antibiotic Therapy: Cefepime     Bladder scan  performed by myself: 500 mL    Impression/Plan:     Urinary retention  Hypospadias  MIYA   BPH  UTI    - Complex nickerson placement completed by Dr. Get Coffman at bedside 11/29/22   - Monitor UOP and kidney function response   - cultured directed abx for UTI   - consider adding flomax for BPH if BP remains stable    Eric Fails PA-C  11/29/2022

## 2022-11-29 NOTE — CONSULTS
Perfect served Urology @ 0686  RE: unable to catheterize, hypospadias, suspect bladder outlet obstruction, MIYA per MD Anel Pate MD called back @ 1620

## 2022-11-29 NOTE — CONSULTS
Pharmacy to Dose Vancomycin    Dx: UTI? Goal trough = 10-20  Pt wt = 101.2 kg  Recent Labs     11/29/22  0030   CREATININE 1.6*     Recent Labs     11/29/22  0030   WBC 21.4*     Regimen: 1250 mg IV every 24 hours. Start time: 15:00 on 11/29/2022  Exposure target: AUC24 (range)400-600 mg/L.hr   AUC24,ss: 484 mg/L.hr  Probability of AUC24 > 400: 71 %  Ctrough,ss: 15.3 mg/L  Probability of Ctrough,ss > 20: 27 %  Probability of nephrotoxicity (Lodise MARIBEL 2009): 11 %  [facility] 11/29/2022 7:52 AM    11/30  Vanc level = 6.5 at 14:09  Continue 1,250mg q24h as ordered  Predictions  AUC24,ss 464 mg/L.hr  Probability of AUC24 > 400 74 %  Ctrough,ss 14.3 mg/L  Laila Russo. 11/30/22 4:26 PM EST    12/1  -  Scr:0.8  -  Continue current regimen , 1250mg q24h   -  E. Faecalis > 100,000 CFU isolated from urine culture  -  Will check with MD if we can d/c cefepime at this time   Laila Russo. 12/1/22 9:16 AM EST

## 2022-11-29 NOTE — ED NOTES
Attempted to straight cath pt, was unsuccessful.  Socorro RN to attempt next     Elsy Clark RN  11/29/22 1288

## 2022-11-29 NOTE — ED NOTES
Valenzuela cath attempted by Dr. Eliana Sexton who was unsuccessful.       Misael Darby RN  11/29/22 9100

## 2022-11-29 NOTE — PROGRESS NOTES
4 Eyes Skin Assessment     NAME:  Hetal Washington  YOB: 1950  MEDICAL RECORD NUMBER:  [id number]    The patient is being assessed for  Admission    I agree that One RN have performed a thorough Head to Toe Skin Assessment on the patient. ALL assessment sites listed below have been assessed. Areas assessed by both nurses:    Head, Face, Ears, Shoulders, Back, Chest, Arms, Elbows, Hands, Sacrum. Buttock, Coccyx, Ischium, and Legs. Feet and Heels        Does the Patient have a Wound? Yes wound(s) were present on assessment.  LDA wound assessment was Initiated and completed by RN       Robert Prevention initiated by RN: Yes   Wound Care Orders initiated by RN: Yes    Pressure Injury (Stage 3,4, Unstageable, DTI, NWPT, and Complex wounds) if present place referral order by RN under : Yes    New and Established Ostomies, if present place, referral order under : No      Nurse 1 eSignature: Electronically signed by Augustine Ferrara RN on 11/29/22 at 6:09 AM EST    **SHARE this note so that the co-signing nurse is able to place an eSignature**    Nurse 2 eSignature: Electronically signed by Josh Cortez RN on 11/29/22 at 6:10 AM EST

## 2022-11-29 NOTE — H&P
Hospital Medicine History & Physical      PCP: No primary care provider on file. Date of Admission: 11/28/2022    Date of Service: Pt seen/examined on 11/29/2022 and Admitted to Inpatient with expected LOS greater than two midnights due to medical therapy. Chief Complaint: AMS    History Of Present Illness:     67 y.o. male, with past medical history of hypertension, hyperlipidemia, diabetes, CHF, who presented to [facility] with altered mental status. .  The patient is a poor historian due to his history of Alzheimer's, history obtained from the review of EMR. The patient is a resident at [address] [place] [address]. He was brought to the emergency room this evening due to multiple falls. Staff reported the patient does have Alzheimer's dementia and is in the memory care unit. Tonight he was found by staff acting different than his baseline. This evening the patient was found in his room with his nightstand flipped on top of him. He was brought to the emergency room where a CT of his head was obtained that revealed no acute intracranial abnormality. A CT cervical spine was also obtained that revealed no acute abnormality of the cervical spine. The patient was found to have an MIYA with a creatinine of 1.6. His baseline appears to be 0.9. However, a urinalysis was unable to be obtained due to the patient's hypospadias. It is likely that the patient has a UTI given his leukocytosis. Urology has been consulted for the a.m. The patient was started on cefepime and vancomycin. He was admitted for further evaluation and treatment. The patient denied any other associated symptoms as well as any aggravating and/or alleviating factors. At the time of this assessment, the patient was resting comfortably in bed. He currently denies any chest pain, back pain, abdominal pain, shortness of breath, numbness, tingling, N/V/C/D, fever and/or chills.      Past Medical History:          Diagnosis Date    Acute cystitis without hematuria     Arthritis     Cerebellar infarct [facility])     Cerebral artery occlusion with cerebral infarction ([facility]     2013  DIZZY  COULDN'T WALK   RESOLVED    Diabetes mellitus ([facility]     Diabetic nephropathy with proteinuria ([facility]     Diabetic peripheral neuropathy ([facility]     Diabetic ulcer of left great toe ([facility] 11/19/2018    Diabetic ulcer of left midfoot associated with type 2 diabetes mellitus ([facility] 11/19/2018    Disease of blood and blood forming organ     Dizziness     Hyperlipidemia     Hypertension     Hypomagnesemia 5/23/2018    Moderate non-proliferative diabetic retinopathy ([facility])     Movement disorder     Sepsis due to urinary tract infection ([facility] 1/5/2018     Past Surgical History:          Procedure Laterality Date    CATARACT REMOVAL  5/7/19 (L) 5/14/19 (R)    COLONOSCOPY      ENDOSCOPY, COLON, DIAGNOSTIC      FOOT SURGERY Right 2011    Ulcer surgery    HYPOSPADIAS CORRECTION      OTHER SURGICAL HISTORY Left 12/20/2016    INCISION AND DRAINAGE LEFT HALLUX         PILONIDAL CYST EXCISION      TONSILLECTOMY      UPPER GASTROINTESTINAL ENDOSCOPY  11/16/2017    Dr Lue Sandifer     Medications Prior to Admission:      Prior to Admission medications    Medication Sig Start Date End Date Taking?  Authorizing Provider   donepezil (ARICEPT) 5 MG tablet TAKE ONE TABLET BY MOUTH EVERY NIGHT AT BEDTIME 9/8/20   TAINA Randolph CNP   LEVEMIR FLEXTOUCH 100 UNIT/ML injection pen INJECT 20 UNITS UNDER THE SKIN NIGHTLY 8/25/20   [facility], APRN - CNP   B-D ULTRAFINE III SHORT PEN 31G X 8 MM MISC USE DAILY 8/19/20   TAINA Hansen CNP   metFORMIN (GLUCOPHAGE) 850 MG tablet TAKE ONE TABLET BY MOUTH TWICE A DAY WITH MEALS 7/27/20   TAINA Randolph CNP   sertraline (ZOLOFT) 25 MG tablet Take 1 tablet by mouth daily 7/8/20   TAINA Randolph CNP   glipiZIDE (GLUCOTROL) 5 MG tablet TAKE TWO TABLETS BY MOUTH TWICE A DAY 6/22/20   TAINA Hansen CNP   atorvastatin (LIPITOR) 10 MG tablet Take 1 tablet by mouth every evening 5/5/20   Hernán Harman Holly Soila, APRN - CNP   Continuous Blood Gluc  (FREESTYLE SHILOH 14 DAY READER) ANITRA 1 Device by Does not apply route continuous 1/30/20   Barrera Lloyd MD   Continuous Blood Gluc Sensor (FREESTYLE SHILOH 14 DAY SENSOR) MISC 1 each by Does not apply route continuous 1/30/20   Barrera Lloyd MD   ofloxacin (OCUFLOX) 0.3 % solution Inject 1 drop into the eye    Historical Provider, MD   ONE TOUCH ULTRA TEST strip USE TO TEST ONCE DAILY 3/26/19   Barrera Lloyd MD   ELIQUIS 5 MG TABS tablet TAKE ONE TABLET BY MOUTH TWICE A DAY  Patient taking differently: Take 5 mg by mouth daily  3/14/19   Noelle Jauregui MD   Acetaminophen (TYLENOL PO) Take by mouth    Historical Provider, MD   [facility] LANCETS 67J MISC USE TO TEST ONCE DAILY 3/28/18   Barrera Lloyd MD   glucose blood VI test strips (ONE TOUCH ULTRA TEST) strip USE TO TEST ONCE DAILY  E11.9 12/2/17   Barrera Lloyd MD   B-D ULTRAFINE III SHORT PEN 31G X 8 MM MISC USE ONCE DAILY WITH LEVEMIR FLEX PEN 8/22/13   Barrera Lloyd MD   ACCU-CHEK BILLY strip TEST TWO TIMES A DAY 7/21/11   Barrera Lloyd MD     Allergies:  Patient has no known allergies. Social History:      The patient currently lives at a nursing home    TOBACCO:   reports that he quit smoking about 41 years ago. His smoking use included cigarettes. He has a 13.00 pack-year smoking history. He has never used smokeless tobacco.  ETOH:   reports current alcohol use. E-cigarette/Vaping       Questions Responses    E-cigarette/Vaping Use Never User    Start Date     Passive Exposure     Quit Date     Counseling Given     Comments           Family History:      Reviewed and negative in regards to presenting illness/complaint.         Problem Relation Age of Onset    Cancer Mother         Cancer    Cancer Father         Bone cancer    No Known Problems Sister     Cancer Sister         Lung cancer    No Known Problems Brother     No Known Problems Maternal Aunt     No Known Problems Maternal Uncle     No Known Problems Paternal Aunt     No Known Problems Paternal Uncle     No Known Problems Maternal Grandmother     No Known Problems Maternal Grandfather     No Known Problems Paternal Grandmother     No Known Problems Paternal Grandfather     No Known Problems Other     Anesth Problems Neg Hx     Broken Bones Neg Hx     Clotting Disorder Neg Hx     Collagen Disease Neg Hx     Diabetes Neg Hx     Dislocations Neg Hx     Osteoporosis Neg Hx     Rheumatologic Disease Neg Hx     Scoliosis Neg Hx     Severe Sprains Neg Hx      REVIEW OF SYSTEMS COMPLETED:   Pertinent positives as noted in the HPI. All other systems reviewed and negative. PHYSICAL EXAM PERFORMED:    BP 97/63   Pulse 65   Temp 98.1 °F (36.7 °C) (Oral)   Resp 16   Ht 6' 4\" (1.93 m)   Wt 223 lb (101.2 kg)   SpO2 97%   BMI 27.14 kg/m²     General appearance: Pleasant male in no apparent distress, appears stated age and cooperative. HEENT:  Normal cephalic, atraumatic without obvious deformity. Pupils equal, round, and reactive to light. Extra ocular muscles intact. Conjunctivae/corneas clear. Neck: Supple, with full range of motion. No jugular venous distention. Trachea midline. Respiratory:  Normal respiratory effort. Clear to auscultation, bilaterally without Rales/Wheezes/Rhonchi. Cardiovascular:  Regular rate and rhythm with normal S1/S2 without murmurs, rubs or gallops. Abdomen: Soft, non-tender, non-distended with normal bowel sounds. Musculoskeletal:  No clubbing, cyanosis or edema bilaterally. Full range of motion without deformity. Skin: Skin color, texture, turgor normal.  No significant rashes or lesions. Neurologic:  Neurovascularly intact.  Cranial nerves: II-XII intact, grossly non-focal.  Psychiatric:  Alert and oriented, thought content appropriate, normal insight  Capillary Refill: Brisk,3 seconds, normal  Peripheral Pulses: +2 palpable, equal bilaterally     Labs:     Recent Labs     11/29/22 0030   WBC 21.4*   HGB 12.0*   HCT 35.9*        Recent Labs     11/29/22 0030      K 4.3   CL 99   CO2 26   BUN 42*   CREATININE 1.6*   CALCIUM 9.2     Recent Labs     11/29/22 0030   AST 28   ALT 14   BILITOT 1.3*   ALKPHOS 86     Recent Labs     11/29/22 0030   TROPONINI 0.04*     Urinalysis:      Lab Results   Component Value Date/Time    NITRU Negative 01/24/2020 11:47 AM    WBCUA 8 01/24/2020 11:47 AM    BACTERIA 1+ 01/05/2018 07:15 AM    RBCUA 5 01/24/2020 11:47 AM    BLOODU tr-int 01/24/2020 11:56 AM    BLOODU Negative 01/24/2020 11:47 AM    SPECGRAV 1.025 01/24/2020 11:56 AM    SPECGRAV 1.020 01/24/2020 11:47 AM    GLUCOSEU 100mg/dl 01/24/2020 11:56 AM    GLUCOSEU 100 01/24/2020 11:47 AM     Radiology:     CXR: I have reviewed the CXR with the following interpretation: No acute cardiopulmonary abnormality    EKG:  I have reviewed the EKG with the following interpretation: The Ekg interpreted in the absence of a cardiologist shows. Sinus rhythm with Premature atrial complexes. Otherwise normal ECG. When compared with ECG of 10-OCT-2019 17:18, No significant change was found    CT CERVICAL SPINE WO CONTRAST   Final Result   No acute abnormality of the cervical spine. CT HEAD WO CONTRAST   Final Result   No acute intracranial abnormality. Moderate diffuse cerebral atrophy. Mild cerebral white matter chronic microvascular ischemic disease. Bilateral maxillary chronic sinusitis. XR CHEST PORTABLE   Final Result   No acute cardiopulmonary abnormality. Lung parenchymal changes suggestive of chronic interstitial lung disease.            Consults:    IP CONSULT TO UROLOGY    ASSESSMENT:    Active Hospital Problems    Diagnosis Date Noted    MIYA (acute kidney injury) ([facility] [N17.9] 11/29/2022     Priority: Medium    Diabetic peripheral neuropathy ([facility] [E11.42]      Priority: Medium    Acute combined systolic and diastolic heart failure ([facility] [I50.41] 05/25/2018    Alzheimer's disease ([facility] [G30.9, F02.80] 05/23/2018    Essential hypertension [I10] 12/19/2016    Mixed hyperlipidemia [E78.2] 12/19/2016    BPH with obstruction/lower urinary tract symptoms [N40.1, N13.8] 04/30/2013     PLAN:    MIYA likely secondary to acute cystitis with obstruction in patient with known BPH, creatinine 1.6 on admission  -Unable to obtain urine specimen due to hypospadias  -Urology consulted in ED  -Baseline creatinine appears to be 0.9  -N.p.o.  -Blood cultures ordered in ED  -2.5 L LR given in ED  -Vancomycin and cefepime given in ED and continued 11/29/2022  -MIVF    Fall out of bed  -CT cervical spine revealed: No acute abnormality of cervical spine  -CT head revealed: No acute intracranial abnormality    Alzheimer's dementia  -Supportive care  -Continue Aricept    DM2, uncontrolled  -  -hemglobin a1c in a.m.  -hold home metformin  -mdssi  -poct ac/hs  -hypoglycemia protocol  -carb control diet    Essential hypertension  -Continue home medication    Hyperlipidemia  -Continue atorvastatin    Acute combined systolic and diastolic CHF, stable  -Does not appear to be in acute exacerbation at this time  -Strict I&O  -Daily weights     Leukocytosis  -Secondary to above  -Antibiotics as indicated above  -CBC in a.m. Normocytic anemia  -No signs or symptoms of bleeding at this time  -CBC in a.m. Elevated troponin, 0.04 on admission  -No complaints of chest pain at this time  -Likely secondary to demand ischemia related to MIYA  -Trend troponin  -Telemetry monitoring    DVT Prophylaxis: Eliquis    Diet: No diet orders on file    Code Status: Prior    PT/OT Eval Status: No indication for need at this time    Dispo -2-3 days pending clinical improvement     TAINA Velasco - CNP    Thank you No primary care provider on file. for the opportunity to be involved in this patient's care.  If you have any questions or concerns please feel free to contact me at [phone].  ------------------dr. Adrian Jacobsen-------------------

## 2022-11-29 NOTE — CARE COORDINATION
CASE MANAGEMENT INITIAL ASSESSMENT      Reviewed chart and completed assessment with patient:bedside  Family present: wife/Juliet  Explained Case Management role/services. Primary contact information:Juliet/wife    Health Care Decision Maker :   Primary Decision Maker: Cesilia Joshua Spouse - [phone]    Secondary Decision Maker: Yehuda Bennett - [phone]    Supplemental (Other) Decision Maker: Radha Cook - [phone]          Can this person be reached and be able to respond quickly, such as within a few minutes or hours? Yes    Admit date/status:11/29/22 INPT  Diagnosis:MIYA   Is this a Readmission?:  No      Insurance:Medicare A&B, [facility] AARP   Precert required for SNF: No       3 night stay required: No    Living arrangements, Adls, care needs, prior to admission: pt lives at [facility], [facility] care unit. Durable Medical Equipment at home:  none    Services in the home and/or outpatient, prior to admission: per [address] [place] [address]                                Medications: Prescription coverage? Yes Will pt require financial assistance with medications No     Transportation needs: none/private. Wife provides transportation       PT/OT recs:needed    Hospital Exemption Notification (HEN):needed for SNF    Barriers to discharge:none    Plan/comments: AMS, falls, MIYA, UTI. Management per urology, psych and IM. IV ABX. Pt lives at [facility] care unit. Wife states he has been there since 10/10/22. Per wife, plan is to return. CM spoke with Fifi at [facility] [employer], will need PT/OT notes and pt needs to be back to baseline before returning. CM will continue to follow for needs.  Karen Harmon RN     ECOC on chart for MD signature

## 2022-11-30 LAB
ANION GAP SERPL CALCULATED.3IONS-SCNC: 9 MMOL/L (ref 3–16)
BASOPHILS ABSOLUTE: 0 K/UL (ref 0–0.2)
BASOPHILS RELATIVE PERCENT: 0.2 %
BUN BLDV-MCNC: 24 MG/DL (ref 7–20)
CALCIUM SERPL-MCNC: 8.9 MG/DL (ref 8.3–10.6)
CHLORIDE BLD-SCNC: 101 MMOL/L (ref 99–110)
CO2: 27 MMOL/L (ref 21–32)
CREAT SERPL-MCNC: 0.8 MG/DL (ref 0.8–1.3)
EOSINOPHILS ABSOLUTE: 0 K/UL (ref 0–0.6)
EOSINOPHILS RELATIVE PERCENT: 0.3 %
GFR SERPL CREATININE-BSD FRML MDRD: >60 ML/MIN/{1.73_M2}
GLUCOSE BLD-MCNC: 140 MG/DL (ref 70–99)
GLUCOSE BLD-MCNC: 146 MG/DL (ref 70–99)
GLUCOSE BLD-MCNC: 160 MG/DL (ref 70–99)
GLUCOSE BLD-MCNC: 177 MG/DL (ref 70–99)
GLUCOSE BLD-MCNC: 213 MG/DL (ref 70–99)
HCT VFR BLD CALC: 34.1 % (ref 40.5–52.5)
HEMOGLOBIN: 11.6 G/DL (ref 13.5–17.5)
LYMPHOCYTES ABSOLUTE: 0.5 K/UL (ref 1–5.1)
LYMPHOCYTES RELATIVE PERCENT: 5 %
MAGNESIUM: 1.2 MG/DL (ref 1.8–2.4)
MCH RBC QN AUTO: 29.5 PG (ref 26–34)
MCHC RBC AUTO-ENTMCNC: 34 G/DL (ref 31–36)
MCV RBC AUTO: 87 FL (ref 80–100)
MONOCYTES ABSOLUTE: 1 K/UL (ref 0–1.3)
MONOCYTES RELATIVE PERCENT: 10.2 %
NEUTROPHILS ABSOLUTE: 8 K/UL (ref 1.7–7.7)
NEUTROPHILS RELATIVE PERCENT: 84.3 %
PDW BLD-RTO: 14.5 % (ref 12.4–15.4)
PERFORMED ON: ABNORMAL
PLATELET # BLD: 344 K/UL (ref 135–450)
PMV BLD AUTO: 7.4 FL (ref 5–10.5)
POTASSIUM SERPL-SCNC: 3.8 MMOL/L (ref 3.5–5.1)
RBC # BLD: 3.92 M/UL (ref 4.2–5.9)
SODIUM BLD-SCNC: 137 MMOL/L (ref 136–145)
VANCOMYCIN TROUGH: 6.5 UG/ML (ref 10–20)
WBC # BLD: 9.5 K/UL (ref 4–11)

## 2022-11-30 PROCEDURE — 2580000003 HC RX 258: Performed by: NURSE PRACTITIONER

## 2022-11-30 PROCEDURE — 6360000002 HC RX W HCPCS: Performed by: INTERNAL MEDICINE

## 2022-11-30 PROCEDURE — 80202 ASSAY OF VANCOMYCIN: CPT

## 2022-11-30 PROCEDURE — 99223 1ST HOSP IP/OBS HIGH 75: CPT | Performed by: PSYCHIATRY & NEUROLOGY

## 2022-11-30 PROCEDURE — 6370000000 HC RX 637 (ALT 250 FOR IP): Performed by: NURSE PRACTITIONER

## 2022-11-30 PROCEDURE — 6370000000 HC RX 637 (ALT 250 FOR IP): Performed by: INTERNAL MEDICINE

## 2022-11-30 PROCEDURE — 2580000003 HC RX 258: Performed by: INTERNAL MEDICINE

## 2022-11-30 PROCEDURE — 1200000000 HC SEMI PRIVATE

## 2022-11-30 PROCEDURE — 83735 ASSAY OF MAGNESIUM: CPT

## 2022-11-30 PROCEDURE — 85025 COMPLETE CBC W/AUTO DIFF WBC: CPT

## 2022-11-30 PROCEDURE — 6360000002 HC RX W HCPCS: Performed by: NURSE PRACTITIONER

## 2022-11-30 PROCEDURE — 80048 BASIC METABOLIC PNL TOTAL CA: CPT

## 2022-11-30 PROCEDURE — 36415 COLL VENOUS BLD VENIPUNCTURE: CPT

## 2022-11-30 RX ORDER — SERTRALINE HYDROCHLORIDE 25 MG/1
75 TABLET, FILM COATED ORAL DAILY
COMMUNITY

## 2022-11-30 RX ORDER — LANOLIN ALCOHOL/MO/W.PET/CERES
1000 CREAM (GRAM) TOPICAL DAILY
COMMUNITY

## 2022-11-30 RX ORDER — METOPROLOL SUCCINATE 25 MG/1
25 TABLET, EXTENDED RELEASE ORAL DAILY
COMMUNITY

## 2022-11-30 RX ORDER — QUETIAPINE FUMARATE 25 MG/1
50 TABLET, FILM COATED ORAL
Status: DISCONTINUED | OUTPATIENT
Start: 2022-12-01 | End: 2022-12-09 | Stop reason: HOSPADM

## 2022-11-30 RX ORDER — MAGNESIUM SULFATE IN WATER 40 MG/ML
2000 INJECTION, SOLUTION INTRAVENOUS PRN
Status: DISCONTINUED | OUTPATIENT
Start: 2022-11-30 | End: 2022-12-05

## 2022-11-30 RX ORDER — QUETIAPINE FUMARATE 25 MG/1
12.5 TABLET, FILM COATED ORAL DAILY
Status: DISCONTINUED | OUTPATIENT
Start: 2022-11-30 | End: 2022-12-09 | Stop reason: HOSPADM

## 2022-11-30 RX ORDER — DIVALPROEX SODIUM 125 MG/1
125 CAPSULE, COATED PELLETS ORAL 2 TIMES DAILY
COMMUNITY

## 2022-11-30 RX ORDER — CHOLECALCIFEROL (VITAMIN D3) 125 MCG
10 CAPSULE ORAL
COMMUNITY

## 2022-11-30 RX ORDER — ATORVASTATIN CALCIUM 40 MG/1
40 TABLET, FILM COATED ORAL
COMMUNITY

## 2022-11-30 RX ORDER — INSULIN GLARGINE 300 U/ML
18 INJECTION, SOLUTION SUBCUTANEOUS
COMMUNITY

## 2022-11-30 RX ADMIN — VANCOMYCIN HYDROCHLORIDE 1250 MG: 10 INJECTION, POWDER, LYOPHILIZED, FOR SOLUTION INTRAVENOUS at 16:51

## 2022-11-30 RX ADMIN — APIXABAN 5 MG: 5 TABLET, FILM COATED ORAL at 10:27

## 2022-11-30 RX ADMIN — SODIUM CHLORIDE: 9 INJECTION, SOLUTION INTRAVENOUS at 12:19

## 2022-11-30 RX ADMIN — SODIUM CHLORIDE: 9 INJECTION, SOLUTION INTRAVENOUS at 10:42

## 2022-11-30 RX ADMIN — CEFEPIME 2000 MG: 2 INJECTION, POWDER, FOR SOLUTION INTRAVENOUS at 14:24

## 2022-11-30 RX ADMIN — SODIUM CHLORIDE: 9 INJECTION, SOLUTION INTRAVENOUS at 14:24

## 2022-11-30 RX ADMIN — MAGNESIUM SULFATE HEPTAHYDRATE 2000 MG: 40 INJECTION, SOLUTION INTRAVENOUS at 12:19

## 2022-11-30 RX ADMIN — DIVALPROEX SODIUM 125 MG: 125 CAPSULE, COATED PELLETS ORAL at 10:28

## 2022-11-30 RX ADMIN — QUETIAPINE FUMARATE 25 MG: 25 TABLET ORAL at 16:28

## 2022-11-30 RX ADMIN — INSULIN LISPRO 2 UNITS: 100 INJECTION, SOLUTION INTRAVENOUS; SUBCUTANEOUS at 13:16

## 2022-11-30 RX ADMIN — INSULIN GLARGINE 5 UNITS: 100 INJECTION, SOLUTION SUBCUTANEOUS at 21:41

## 2022-11-30 RX ADMIN — CEFEPIME 2000 MG: 2 INJECTION, POWDER, FOR SOLUTION INTRAVENOUS at 22:42

## 2022-11-30 RX ADMIN — SERTRALINE HYDROCHLORIDE 25 MG: 50 TABLET ORAL at 10:27

## 2022-11-30 RX ADMIN — COLLAGENASE SANTYL: 250 OINTMENT TOPICAL at 15:11

## 2022-11-30 ASSESSMENT — PAIN SCALES - GENERAL: PAINLEVEL_OUTOF10: 0

## 2022-11-30 NOTE — CARE COORDINATION
Chart reviewed. AMS, falls, MIYA, UTI. Management per urology, psych and IM. IV ABX. Pt lives at [facility]. Pt now has nickerson catheter placed by urology for retention. Per urology, nickerson should remain in place for 1 week. Jason Foil will not be able to manage or remove nickerson. Wife requesting short skilled stay, until nickerson removed. CM called [facility], they are reviewing chart. CM will continue to follow.  Karen Hernandez RN

## 2022-11-30 NOTE — PLAN OF CARE
Pt confused and non-receptive to education at this time, board updated with discharge barriers to aid wife in following plan of care during visiting hours.

## 2022-11-30 NOTE — PROGRESS NOTES
Patient's EF (Ejection Fraction) is 40%    Heart Failure Medications:  Diuretics[de-identified] None    (One of the following REQUIRED for EF </= 40%/SYSTOLIC FAILURE but MAY be used in EF% >40%/DIASTOLIC FAILURE)        ACE[de-identified] None        ARB[de-identified] None         ARNI[de-identified] None    (Beta Blockers)  NON- Evidenced Based Beta Blocker (for EF% >40%/DIASTOLIC FAILURE): None    Evidenced Based Beta Blocker::(REQUIRED for EF% <40%/SYSTOLIC FAILURE) None  . .................................................................................................................................................. Patient's weights and intake/output reviewed: Yes    Patient's Last Weight: 183 lb 13.8 oz obtained by bed scale. Difference of 30 lbs less than pt stated weight. Intake/Output Summary (Last 24 hours) at 11/30/2022 0601  Last data filed at 11/30/2022 7621  Gross per 24 hour   Intake 720 ml   Output 3745 ml   Net -3025 ml       Education Booklet Provided: yes    Comorbidities Reviewed Yes    Patient has a past medical history of Acute cystitis without hematuria, Arthritis, Cerebellar infarct ([address] Cerebral artery occlusion with cerebral infarction ([address] Diabetes mellitus ([address] Diabetic nephropathy with proteinuria ([address] Diabetic peripheral neuropathy ([address] Diabetic ulcer of left great toe ([address] Diabetic ulcer of left midfoot associated with type 2 diabetes mellitus ([address] Disease of blood and blood forming organ, Dizziness, Hyperlipidemia, Hypertension, Hypomagnesemia, Moderate non-proliferative diabetic retinopathy ([address] Movement disorder, and Sepsis due to urinary tract infection ([facility]      >>For CHF and Comorbidity documentation on Education Time and Topics, please see Education Tab    Progressive Mobility Assessment:  What is this patient's Current Level of Mobility?: Ambulatory- with Assistance  How was this patient Mobilized today?:  turned in bed and assisted by staff to turn as pt safety allowed for , ambulated 0 ft With Whom? Nurse and PCA                 Level of Difficulty/Assistance: 1x Assist     Pt resting in bed at this time on room air. Pt denies shortness of breath. Pt without lower extremity edema.      Patient and/or Family's stated Goal of Care this Admission: be more comfortable prior to discharge        :

## 2022-11-30 NOTE — CONSULTS
Full note dictated. Met with patient and his wife. Dx:  delirium          Dementia alzheimers    Rec:  1). I will stop the arricept. His wife doesn't feel these meds have helped and generally are designed to prevent the need for long term care which he currently needs. I will adjust the seroquel. He will continue to see Narcisa Graham at [facility] upon discharge. I will follow.      Bethany Mcdonald MD

## 2022-11-30 NOTE — CONSULTS
[address] [address] Continence Nurse  Consult Note       NAME:  Karen Mcghee  MEDICAL RECORD NUMBER:  [id number]  AGE: 67 y.o. GENDER: male  : 1950  TODAY'S DATE:  2022    Subjective  Stated name and birth date. Then confused conversation. Reason for WOCN Evaluation and Assessment: stage IV to coccyx/open area inner L foot      Karen Mcghee is a 67 y.o. male referred by:   [x] Physician  [] Nursing  [] Other:     Wound Identification:  Wound Type: Pre-existing Unstageable pressure injury mid coccyx. Old dry scab posterior head (unknown etiology but not pressure related). No wound noted on left foot. Contributing Factors: diabetes, chronic pressure, decreased mobility, shear force, and obesity    Wound History:  hx of htn, hld dm2, chf, dementia p/w change in mentation. Pt lives at [facility] and was brought in due to ongoing falls. Staff noted on day of arrival that pt had a nightstand flipped over on top of him. Admitted with wound.   Current Wound Care Treatment:  foam dressing    Patient Goal of Care:  [x] Wound Healing  [] Odor Control  [] Palliative Care  [] Pain Control   [x] Other: off loading        PAST MEDICAL HISTORY        Diagnosis Date    Acute cystitis without hematuria     Arthritis     Cerebellar infarct [facility])     Cerebral artery occlusion with cerebral infarction ([address]       DIZZY  COULDN'T WALK   RESOLVED    Diabetes mellitus ([address]     Diabetic nephropathy with proteinuria ([address]     Diabetic peripheral neuropathy ([address]     Diabetic ulcer of left great toe ([address] 2018    Diabetic ulcer of left midfoot associated with type 2 diabetes mellitus ([address] 2018    Disease of blood and blood forming organ     Dizziness     Hyperlipidemia     Hypertension     Hypomagnesemia 2018    Moderate non-proliferative diabetic retinopathy ([address]     Movement disorder     Sepsis due to urinary tract infection ([address] 2018       PAST SURGICAL HISTORY    Past Surgical History:   Procedure Laterality Date    CATARACT REMOVAL  19 (L) 19 (R)    COLONOSCOPY      ENDOSCOPY, COLON, DIAGNOSTIC      FOOT SURGERY Right     Ulcer surgery    HYPOSPADIAS CORRECTION      OTHER SURGICAL HISTORY Left 2016    INCISION AND DRAINAGE LEFT HALLUX         PILONIDAL CYST EXCISION      TONSILLECTOMY      UPPER GASTROINTESTINAL ENDOSCOPY  2017    Dr Irene Marques HISTORY    Family History   Problem Relation Age of Onset    Cancer Mother         Cancer    Cancer Father         Bone cancer    No Known Problems Sister     Cancer Sister         Lung cancer    No Known Problems Brother     No Known Problems Maternal Aunt     No Known Problems Maternal Uncle     No Known Problems Paternal Aunt     No Known Problems Paternal Uncle     No Known Problems Maternal Grandmother     No Known Problems Maternal Grandfather     No Known Problems Paternal Grandmother     No Known Problems Paternal Grandfather     No Known Problems Other     Anesth Problems Neg Hx     Broken Bones Neg Hx     Clotting Disorder Neg Hx     Collagen Disease Neg Hx     Diabetes Neg Hx     Dislocations Neg Hx     Osteoporosis Neg Hx     Rheumatologic Disease Neg Hx     Scoliosis Neg Hx     Severe Sprains Neg Hx        SOCIAL HISTORY    Social History     Tobacco Use    Smoking status: Former     Packs/day: 1.00     Years: 13.00     Pack years: 13.00     Types: Cigarettes     Quit date: 1981     Years since quittin.3    Smokeless tobacco: Never   Vaping Use    Vaping Use: Never used   Substance Use Topics    Alcohol use: Yes     Comment: rare    Drug use: No       ALLERGIES    No Known Allergies    MEDICATIONS    No current facility-administered medications on file prior to encounter.      Current Outpatient Medications on File Prior to Encounter   Medication Sig Dispense Refill    melatonin 3 MG TABS tablet Take 10 mg by mouth daily      divalproex (DEPAKOTE SPRINKLE) 125 MG capsule Take 125 mg by mouth 2 times daily      QUEtiapine (SEROQUEL) 25 MG tablet Take 25 mg by mouth Daily with supper      donepezil (ARICEPT) 5 MG tablet TAKE ONE TABLET BY MOUTH EVERY NIGHT AT BEDTIME 90 tablet 0    LEVEMIR FLEXTOUCH 100 UNIT/ML injection pen INJECT 20 UNITS UNDER THE SKIN NIGHTLY 3 pen 1    B-D ULTRAFINE III SHORT PEN 31G X 8 MM MISC USE DAILY 100 each 1    metFORMIN (GLUCOPHAGE) 850 MG tablet TAKE ONE TABLET BY MOUTH TWICE A DAY WITH MEALS 180 tablet 1    sertraline (ZOLOFT) 25 MG tablet Take 1 tablet by mouth daily (Patient taking differently: Take 75 mg by mouth daily) 90 tablet 1    glipiZIDE (GLUCOTROL) 5 MG tablet TAKE TWO TABLETS BY MOUTH TWICE A  tablet 1    atorvastatin (LIPITOR) 10 MG tablet Take 1 tablet by mouth every evening (Patient taking differently: 40 mg Take 1 tablet by mouth every evening) 90 tablet 1    Continuous Blood Gluc  (FREESTYLE SHILOH 14 DAY READER) ANITRA 1 Device by Does not apply route continuous 1 Device 0    Continuous Blood Gluc Sensor (FREESTYLE SHILOH 14 DAY SENSOR) MISC 1 each by Does not apply route continuous 2 each 5    ofloxacin (OCUFLOX) 0.3 % solution Inject 1 drop into the eye      ONE TOUCH ULTRA TEST strip USE TO TEST ONCE DAILY 100 strip 3    ELIQUIS 5 MG TABS tablet TAKE ONE TABLET BY MOUTH TWICE A DAY (Patient taking differently: Take 5 mg by mouth 2 times daily) 60 tablet 2    Acetaminophen (TYLENOL PO) Take by mouth      ONETOUCH DELICA LANCETS 01I MISC USE TO TEST ONCE DAILY 100 each 4    glucose blood VI test strips (ONE TOUCH ULTRA TEST) strip USE TO TEST ONCE DAILY  E11.9 100 strip 3    B-D ULTRAFINE III SHORT PEN 31G X 8 MM MISC USE ONCE DAILY WITH LEVEMIR FLEX  each 2    ACCU-CHEK BILLY strip TEST TWO TIMES A DAY 50 strip 2       Objective  Valenzuela placed by urology. 3 point wrist restraints. Wiggling about in bed.     BP (!) 166/80   Pulse 83   Temp 98 °F (36.7 °C) (Axillary)   Resp 17   Ht 6' 4\" (1.93 m)   Wt 183 lb 13.8 oz (83.4 kg) SpO2 98%   BMI 22.38 kg/m²     LABS:  WBC:    Lab Results   Component Value Date/Time    WBC 9.5 11/30/2022 07:48 AM     H/H:    Lab Results   Component Value Date/Time    HGB 11.6 11/30/2022 07:48 AM    HCT 34.1 11/30/2022 07:48 AM     PTT:    Lab Results   Component Value Date/Time    APTT 27.5 01/05/2018 04:38 AM   [APTT}  PT/INR:    Lab Results   Component Value Date/Time    PROTIME 12.3 01/05/2018 04:38 AM    INR 1.09 01/05/2018 04:38 AM     HgBA1c:    Lab Results   Component Value Date/Time    LABA1C 7.1 11/29/2022 12:30 AM       Assessment   Back of head dry scab, no need for treatment of this wound. Mid coccyx with 100% yellow slough. Angie wound slightly red.  See photos   Robert Risk Score: Robert Scale Score: 19    Patient Active Problem List   Diagnosis    BPH with obstruction/lower urinary tract symptoms    ED (erectile dysfunction)    Cerebellar infarct    Diabetic nephropathy with proteinuria (HCC)    Diabetic peripheral neuropathy ([address]    Essential hypertension    Controlled type 2 diabetes mellitus with complication, with long-term current use of insulin (HCC)    Mixed hyperlipidemia    SVT (supraventricular tachycardia) (HCC)    Alzheimer's disease ([address]    Acute combined systolic and diastolic heart failure (HCC)    Persistent depressive disorder with anxious distress, currently moderate    Anxiety    MIYA (acute kidney injury) ([address]       Measurements:  Wound 11/29/22 Head Posterior (Active)   Wound Image   11/30/22 0953   Wound Etiology Other 11/30/22 0953   Dressing/Treatment Open to air 11/30/22 0953   Wound Length (cm) 1 cm 11/30/22 0953   Wound Width (cm) 1.5 cm 11/30/22 0953   Wound Depth (cm) 0 cm 11/30/22 0953   Wound Surface Area (cm^2) 1.5 cm^2 11/30/22 0953   Wound Volume (cm^3) 0 cm^3 11/30/22 0953   Distance Tunneling (cm) 0 cm 11/30/22 0953   Tunneling Position ___ O'Clock 0 11/30/22 0953   Undermining Starts ___ O'Clock 0 11/30/22 0953   Undermining Ends___ O'Clock 0 11/30/22 6667 Undermining Maxium Distance (cm) 0 11/30/22 0953   Wound Assessment Eschar dry 11/30/22 0953   Drainage Amount None 11/30/22 0953   Odor None 11/30/22 0953   Angie-wound Assessment Dry/flaky; Intact 11/30/22 0953   Margins Attached edges; Defined edges 11/30/22 0953   Number of days: 1      Back of head:         Wound 11/30/22 Coccyx Mid slough 100% (Active)   Wound Image   11/30/22 0953   Wound Etiology Pressure Unstageable 11/30/22 0953   Dressing Status Clean;Dry; Intact 11/30/22 0953   Wound Cleansed Not Cleansed 11/30/22 0953   Dressing/Treatment Foam 11/30/22 0953   Offloading for Diabetic Foot Ulcers Offloading ordered 11/30/22 0953   Dressing Change Due 11/30/22 11/30/22 0953   Wound Length (cm) 4 cm 11/30/22 0953   Wound Width (cm) 1.2 cm 11/30/22 0953   Wound Depth (cm) 0.1 cm 11/30/22 0953   Wound Surface Area (cm^2) 4.8 cm^2 11/30/22 0953   Wound Volume (cm^3) 0.48 cm^3 11/30/22 0953   Distance Tunneling (cm) 0 cm 11/30/22 0953   Tunneling Position ___ O'Clock 0 11/30/22 0953   Undermining Starts ___ O'Clock 0 11/30/22 0953   Undermining Ends___ O'Clock 0 11/30/22 0953   Undermining Maxium Distance (cm) 0 11/30/22 0953   Wound Assessment Slough 11/30/22 0953   Drainage Amount Scant 11/30/22 0953   Drainage Description Serous 11/30/22 0953   Odor None 11/30/22 0953   Angie-wound Assessment Blanchable erythema 11/30/22 0953   Margins Attached edges; Defined edges 11/30/22 0953   Wound Thickness Description not for Pressure Injury Partial thickness 11/30/22 0953   Number of days: 0      Mid coccyx        Response to treatment:  Well tolerated by patient. Well tolerated by patient. Pain Assessment:  Severity:  0 / 10  Quality of pain: N/A  Wound Pain Timing/Severity: none  Premedicated: No    Plan   Plan of Care: Wound 11/29/22 Head Posterior-Dressing/Treatment: Open to air  Wound 11/30/22 Coccyx Mid slough 100%-Dressing/Treatment: Foam (santyl ordered)    Dr Oskar Grande here and viewed photo.   Aware of pre-existing unstageable now. Santyl ordered for treatment. Recommend:  Clean mid coccyx with normal saline. Apply santyl (lew thick) cover with dry dressing and Medipore tape (please DO NOT use foam dressing and medication will be absorbed in the dressing). Pressure prevention protocol. Off load coccyx with wedge pillow. Call wound care for deterioration [phone] or call [phone] and leave message. Wound care to follow. Specialty Bed Required : Yes   [] Low Air Loss   [x] Pressure Redistribution  [] Fluid Immersion  [] Bariatric  [] Total Pressure Relief  [x] Other: [place] over lay for pulling up in bed. Current Diet: ADULT DIET; Regular  Dietician consult:  Yes    Discharge Plan:  Placement for patient upon discharge: skilled nursing    Patient appropriate for Outpatient [address]: Yes    Referrals:  []  / discharge planner will return to [address] [place] [address]  [] [address] [place] [employer] [facility]  [] Supplies  [] Other    Patient/Caregiver Teaching: instructed importance of off loading. No family present.   Level of patient/caregiver understanding able to:   [] Indicates understanding       [] Needs reinforcement  [] Unsuccessful      [] Verbal Understanding  [] Demonstrated understanding       [x] No evidence of learning  [] Refused teaching         [] N/A       Electronically signed by Valeria Coffman RN, MSN, Kat Dunlap on 11/30/2022 at 9:57 AM

## 2022-11-30 NOTE — PROGRESS NOTES
Physician Progress Note      Derick Bolden  [facility] #:                  [id number]  :                       1950  ADMIT DATE:       2022 11:41 PM  DISCH DATE:  RESPONDING  PROVIDER #:        Richa Francis MD          QUERY TEXT:    Pt admitted with AMS. Pt noted to have UTI and arrives with WBC 21.4, PCT   25.64, and MIYA. If possible, please document in the progress notes and   discharge summary if you are evaluating and /or treating any of the following: The medical record reflects the following:  Risk Factors: BPH, Hypospadias, dementia  Clinical Indicators: on arrival to ED- WBC 21.4, PCT 25.64, MIYA w/ creat 1.6   and AMS  Urine Cx- Enterococcus faecalis  Treatment: Urology consult, nickerson placed per urology, 1 L LR IVF bolus, NS 75   ml/hr cont. infusion, Cefepime/Vancomycin, labs, supportive monitored care    Thank you, Travon Multani RN, BSN  [email or web address]  Options provided:  -- Sepsis POA d/t UTI  -- UTI without Sepsis  -- Other - I will add my own diagnosis  -- Disagree - Not applicable / Not valid  -- Disagree - Clinically unable to determine / Unknown  -- Refer to Clinical Documentation Reviewer    PROVIDER RESPONSE TEXT:    This patient has UTI without Sepsis.     Query created by: Tory Albarado on 2022 3:10 PM      Electronically signed by:  Richa Francis MD 2022 4:14 PM

## 2022-11-30 NOTE — PROGRESS NOTES
Hospitalist Progress Note      PCP: Sergio Bolaños    Date of Admission: 11/28/2022    Chief Complaint: Confusion, urinary retention    Hospital Course: Presented with confusion. Found to have acute kidney injury and urinary retention. Improved after Valenzuela catheter insertion by urology. Also noted to have elevated troponin and elevated procalcitonin. On empiric antibiotics. Subjective: Confused, hallucinating, denies chest pain or shortness of breath. Medications:  Reviewed    Infusion Medications    sodium chloride 25 mL/hr at 11/30/22 1219    sodium chloride 75 mL/hr at 11/30/22 1042    dextrose       Scheduled Medications    collagenase   Topical Daily    atorvastatin  10 mg Oral Nightly    donepezil  5 mg Oral Nightly    insulin glargine  5 Units SubCUTAneous Nightly    sodium chloride flush  5-40 mL IntraVENous 2 times per day    cefepime  2,000 mg IntraVENous Q12H    insulin lispro  0-8 Units SubCUTAneous TID WC    insulin lispro  0-4 Units SubCUTAneous Nightly    vancomycin  1,250 mg IntraVENous Q24H    apixaban  5 mg Oral BID    QUEtiapine  25 mg Oral Dinner    sertraline  25 mg Oral Daily    divalproex  125 mg Oral 2 times per day     PRN Meds: magnesium sulfate, sodium chloride flush, sodium chloride, ondansetron **OR** ondansetron, acetaminophen **OR** acetaminophen, glucose, dextrose bolus **OR** dextrose bolus, glucagon (rDNA), dextrose, ziprasidone      Intake/Output Summary (Last 24 hours) at 11/30/2022 1224  Last data filed at 11/30/2022 0537  Gross per 24 hour   Intake 720 ml   Output 3225 ml   Net -2505 ml       Physical Exam Performed:    /70   Pulse 80   Temp 98.1 °F (36.7 °C) (Oral)   Resp 16   Ht 6' 4\" (1.93 m)   Wt 183 lb 13.8 oz (83.4 kg)   SpO2 99%   BMI 22.38 kg/m²     General appearance: No apparent distress, appears stated age and cooperative. HEENT: Pupils equal, round, and reactive to light. Conjunctivae/corneas clear.   Neck: Supple, with full range of motion. No jugular venous distention. Trachea midline. Respiratory:  Normal respiratory effort. Clear to auscultation, bilaterally without Rales/Wheezes/Rhonchi. Cardiovascular: Regular rate and rhythm with normal S1/S2 without murmurs, rubs or gallops. Abdomen: Soft, non-tender, non-distended with normal bowel sounds. Musculoskeletal: No clubbing, cyanosis or edema bilaterally. Full range of motion without deformity. Skin: unstageable 4 cm sacral decubitus that was present on arrival.  Neurologic:  Neurovascularly intact without any focal sensory/motor deficits. Cranial nerves: II-XII intact, grossly non-focal.  Psychiatric: Alert and pleasantly confused with hallucinations \"there was a big party here\", \"people got a lot of money\". Capillary Refill: Brisk, 3 seconds, normal   Peripheral Pulses: +2 palpable, equal bilaterally       Labs:   Recent Labs     11/29/22  0030 11/30/22  0748   WBC 21.4* 9.5   HGB 12.0* 11.6*   HCT 35.9* 34.1*    344     Recent Labs     11/29/22  0030 11/30/22  0748    137   K 4.3 3.8   CL 99 101   CO2 26 27   BUN 42* 24*   CREATININE 1.6* 0.8   CALCIUM 9.2 8.9     Recent Labs     11/29/22  0030   AST 28   ALT 14   BILITOT 1.3*   ALKPHOS 86     No results for input(s): INR in the last 72 hours. Recent Labs     11/29/22  1102 11/29/22  1631 11/29/22  2220   TROPONINI 0.03* 0.02* 0.02*       Urinalysis:      Lab Results   Component Value Date/Time    NITRU Negative 11/29/2022 10:50 AM    WBCUA  11/29/2022 10:50 AM    BACTERIA 4+ 11/29/2022 10:50 AM    RBCUA 5-10 11/29/2022 10:50 AM    BLOODU SMALL 11/29/2022 10:50 AM    SPECGRAV 1.020 11/29/2022 10:50 AM    GLUCOSEU Negative 11/29/2022 10:50 AM       Radiology:  CT CERVICAL SPINE WO CONTRAST   Final Result   No acute abnormality of the cervical spine. CT HEAD WO CONTRAST   Final Result   No acute intracranial abnormality. Moderate diffuse cerebral atrophy.       Mild cerebral white matter chronic microvascular ischemic disease. Bilateral maxillary chronic sinusitis. XR CHEST PORTABLE   Final Result   No acute cardiopulmonary abnormality. Lung parenchymal changes suggestive of chronic interstitial lung disease. IP CONSULT TO UROLOGY  IP CONSULT TO PHARMACY  IP CONSULT TO UROLOGY  IP CONSULT TO PSYCHIATRY    Assessment/Plan:    Active Hospital Problems    Diagnosis     MIYA (acute kidney injury) ([facility] [N17.9]      Priority: Medium    Diabetic peripheral neuropathy ([facility] [E11.42]      Priority: Medium    Acute combined systolic and diastolic heart failure (HCC) [I50.41]     Alzheimer's disease ([facility] [G30.9, F02.80]     Essential hypertension [I10]     Mixed hyperlipidemia [E78.2]     BPH with obstruction/lower urinary tract symptoms [N40.1, N13.8]      PLAN    Acute post renal kidney failure  Creatinine normalized following Valenzuela catheter insertion. Urology has evaluated and recommends outpatient follow-up. Patient to keep Valenzuela catheter for a week before voiding trial    Unstageable sacral decubitus ulcer  Discussed with wound care. Orders entered. Mental status changes  Has advanced dementia. Already in memory unit. However, current functional status does not seem appropriate for memory unit. May require short-term rehab. Psychiatry consulted by admitting physician. Opinion pending    Paroxysmal atrial fibrillation  Continue Eliquis    Suspected UTI  Abnormal UA, elevated procalcitonin. Started on empiric antibiotics. If patient has a UTI, this could explain the patient's worsening mental status. Cultures are pending at this time. Hypomagnesemia  Monitor and replace. Elevated troponin  Hemodynamically stable. No active chest pain. Consistent with demand ischemia and possibly impaired clearance. Nothing further from cardiac standpoint. Diabetes mellitus type 2  Baseline hemoglobin A1c 7.1%. Continue basal bolus insulin regimen.     Discussed with nursing    DVT Prophylaxis: Eliquis  Diet: ADULT DIET; Regular  Code Status: Full Code  PT/OT Eval Status: Ordered    Dispo - Unclear as of today.     Appropriate for A1 Discharge Unit: Bernadette Ca MD

## 2022-12-01 LAB
ANION GAP SERPL CALCULATED.3IONS-SCNC: 15 MMOL/L (ref 3–16)
BUN BLDV-MCNC: 18 MG/DL (ref 7–20)
CALCIUM SERPL-MCNC: 8.8 MG/DL (ref 8.3–10.6)
CHLORIDE BLD-SCNC: 101 MMOL/L (ref 99–110)
CO2: 21 MMOL/L (ref 21–32)
CREAT SERPL-MCNC: 0.8 MG/DL (ref 0.8–1.3)
GFR SERPL CREATININE-BSD FRML MDRD: >60 ML/MIN/{1.73_M2}
GLUCOSE BLD-MCNC: 140 MG/DL (ref 70–99)
GLUCOSE BLD-MCNC: 146 MG/DL (ref 70–99)
GLUCOSE BLD-MCNC: 202 MG/DL (ref 70–99)
GLUCOSE BLD-MCNC: 246 MG/DL (ref 70–99)
GLUCOSE BLD-MCNC: 252 MG/DL (ref 70–99)
MAGNESIUM: 1.6 MG/DL (ref 1.8–2.4)
ORGANISM: ABNORMAL
PERFORMED ON: ABNORMAL
POTASSIUM SERPL-SCNC: 4.1 MMOL/L (ref 3.5–5.1)
PROCALCITONIN: 2.94 NG/ML (ref 0–0.15)
SODIUM BLD-SCNC: 137 MMOL/L (ref 136–145)
URINE CULTURE, ROUTINE: ABNORMAL

## 2022-12-01 PROCEDURE — 97162 PT EVAL MOD COMPLEX 30 MIN: CPT

## 2022-12-01 PROCEDURE — 83735 ASSAY OF MAGNESIUM: CPT

## 2022-12-01 PROCEDURE — 1200000000 HC SEMI PRIVATE

## 2022-12-01 PROCEDURE — 2580000003 HC RX 258: Performed by: INTERNAL MEDICINE

## 2022-12-01 PROCEDURE — 36415 COLL VENOUS BLD VENIPUNCTURE: CPT

## 2022-12-01 PROCEDURE — 6360000002 HC RX W HCPCS: Performed by: INTERNAL MEDICINE

## 2022-12-01 PROCEDURE — 2580000003 HC RX 258: Performed by: NURSE PRACTITIONER

## 2022-12-01 PROCEDURE — 6370000000 HC RX 637 (ALT 250 FOR IP): Performed by: NURSE PRACTITIONER

## 2022-12-01 PROCEDURE — 6370000000 HC RX 637 (ALT 250 FOR IP): Performed by: INTERNAL MEDICINE

## 2022-12-01 PROCEDURE — 6370000000 HC RX 637 (ALT 250 FOR IP): Performed by: PSYCHIATRY & NEUROLOGY

## 2022-12-01 PROCEDURE — 80048 BASIC METABOLIC PNL TOTAL CA: CPT

## 2022-12-01 PROCEDURE — 97167 OT EVAL HIGH COMPLEX 60 MIN: CPT

## 2022-12-01 PROCEDURE — 97530 THERAPEUTIC ACTIVITIES: CPT

## 2022-12-01 PROCEDURE — 99233 SBSQ HOSP IP/OBS HIGH 50: CPT | Performed by: PSYCHIATRY & NEUROLOGY

## 2022-12-01 PROCEDURE — 6360000002 HC RX W HCPCS: Performed by: NURSE PRACTITIONER

## 2022-12-01 PROCEDURE — 97535 SELF CARE MNGMENT TRAINING: CPT

## 2022-12-01 PROCEDURE — 97116 GAIT TRAINING THERAPY: CPT

## 2022-12-01 PROCEDURE — 84145 PROCALCITONIN (PCT): CPT

## 2022-12-01 RX ORDER — LANOLIN ALCOHOL/MO/W.PET/CERES
400 CREAM (GRAM) TOPICAL 2 TIMES DAILY
Status: COMPLETED | OUTPATIENT
Start: 2022-12-01 | End: 2022-12-01

## 2022-12-01 RX ADMIN — SERTRALINE HYDROCHLORIDE 25 MG: 50 TABLET ORAL at 10:31

## 2022-12-01 RX ADMIN — DIVALPROEX SODIUM 125 MG: 125 CAPSULE, COATED PELLETS ORAL at 20:09

## 2022-12-01 RX ADMIN — APIXABAN 5 MG: 5 TABLET, FILM COATED ORAL at 20:09

## 2022-12-01 RX ADMIN — QUETIAPINE FUMARATE 50 MG: 25 TABLET ORAL at 18:26

## 2022-12-01 RX ADMIN — APIXABAN 5 MG: 5 TABLET, FILM COATED ORAL at 10:32

## 2022-12-01 RX ADMIN — SODIUM CHLORIDE, PRESERVATIVE FREE 10 ML: 5 INJECTION INTRAVENOUS at 20:10

## 2022-12-01 RX ADMIN — SODIUM CHLORIDE: 9 INJECTION, SOLUTION INTRAVENOUS at 12:33

## 2022-12-01 RX ADMIN — MAGNESIUM SULFATE HEPTAHYDRATE 2000 MG: 40 INJECTION, SOLUTION INTRAVENOUS at 10:41

## 2022-12-01 RX ADMIN — Medication 400 MG: at 20:10

## 2022-12-01 RX ADMIN — DIVALPROEX SODIUM 125 MG: 125 CAPSULE, COATED PELLETS ORAL at 10:32

## 2022-12-01 RX ADMIN — SODIUM CHLORIDE: 9 INJECTION, SOLUTION INTRAVENOUS at 06:07

## 2022-12-01 RX ADMIN — INSULIN LISPRO 2 UNITS: 100 INJECTION, SOLUTION INTRAVENOUS; SUBCUTANEOUS at 18:26

## 2022-12-01 RX ADMIN — VANCOMYCIN HYDROCHLORIDE 1250 MG: 10 INJECTION, POWDER, LYOPHILIZED, FOR SOLUTION INTRAVENOUS at 15:40

## 2022-12-01 RX ADMIN — QUETIAPINE FUMARATE 12.5 MG: 25 TABLET ORAL at 10:31

## 2022-12-01 RX ADMIN — CEFEPIME 2000 MG: 2 INJECTION, POWDER, FOR SOLUTION INTRAVENOUS at 12:34

## 2022-12-01 RX ADMIN — CEFEPIME 2000 MG: 2 INJECTION, POWDER, FOR SOLUTION INTRAVENOUS at 22:04

## 2022-12-01 RX ADMIN — SODIUM CHLORIDE, PRESERVATIVE FREE 10 ML: 5 INJECTION INTRAVENOUS at 10:42

## 2022-12-01 RX ADMIN — Medication 400 MG: at 15:39

## 2022-12-01 RX ADMIN — INSULIN GLARGINE 5 UNITS: 100 INJECTION, SOLUTION SUBCUTANEOUS at 22:04

## 2022-12-01 RX ADMIN — SODIUM CHLORIDE: 9 INJECTION, SOLUTION INTRAVENOUS at 20:15

## 2022-12-01 RX ADMIN — ATORVASTATIN CALCIUM 10 MG: 10 TABLET, FILM COATED ORAL at 20:09

## 2022-12-01 RX ADMIN — COLLAGENASE SANTYL: 250 OINTMENT TOPICAL at 14:57

## 2022-12-01 RX ADMIN — SODIUM CHLORIDE: 9 INJECTION, SOLUTION INTRAVENOUS at 10:40

## 2022-12-01 RX ADMIN — INSULIN LISPRO 2 UNITS: 100 INJECTION, SOLUTION INTRAVENOUS; SUBCUTANEOUS at 14:46

## 2022-12-01 ASSESSMENT — PAIN SCALES - GENERAL: PAINLEVEL_OUTOF10: 0

## 2022-12-01 NOTE — PROGRESS NOTES
Comprehensive Nutrition Assessment    Type and Reason for Visit:  Initial, Positive Nutrition Screen, Wound    Nutrition Recommendations/Plan:   Continue general diet - monitor need for carb control diet   Add Glucerna BID - monitor acceptance   Encourage PO intakes as tolerated   Assist with meals as needed   Monitor nutrition adequacy, pertinent labs, bowel habits, wt changes, and clinical progress     Malnutrition Assessment:  Malnutrition Status:  Insufficient data (12/01/22 1446)      Nutrition Assessment:    66 yo male admitted with diabetic neuropathy. Hx od DM and dementia. Pt nutritionally compromised AEB wound. Confused, sitter at bedside. Pt ate most of breakfast this AM. Denies any nausea today. Pt agreeable to ONS, RD to add to promote wound healing. No further nutrition questions or concerns at this time. Will monitor. Nutrition Related Findings:    -213 since admission. A1c of 7.1% on 11/29/22. Wound Type: Pressure Injury, Unstageable       Current Nutrition Intake & Therapies:    Average Meal Intake: %  Average Supplements Intake: None Ordered  ADULT DIET; Regular    Anthropometric Measures:  Height: 6' 4\" (193 cm)  Ideal Body Weight (IBW): 202 lbs (92 kg)       Current Body Weight: 182 lb (82.6 kg), 90.1 % IBW.  Weight Source: Bed Scale  Current BMI (kg/m2): 22.2  Usual Body Weight:  (stated weight hx)                       BMI Categories: Normal Weight (BMI 22.0 to 24.9) age over 72    Estimated Daily Nutrient Needs:  Energy Requirements Based On: Kcal/kg (25-30)  Weight Used for Energy Requirements: Current  Energy (kcal/day): 8445-0194 kcal  Weight Used for Protein Requirements: Current (1.2-1.4 g/kg)  Protein (g/day):  g  Method Used for Fluid Requirements: 1 ml/kcal  Fluid (ml/day): 0461-1316 mL    Nutrition Diagnosis:   Increased nutrient needs related to increase demand for energy/nutrients as evidenced by wounds    Nutrition Interventions:   Food and/or Nutrient Delivery: Continue Current Diet, Start Oral Nutrition Supplement  Nutrition Education/Counseling: No recommendation at this time, Education not appropriate  Coordination of Nutrition Care: Continue to monitor while inpatient       Goals:     Goals: PO intake 50% or greater, prior to discharge       Nutrition Monitoring and Evaluation:   Behavioral-Environmental Outcomes: None Identified  Food/Nutrient Intake Outcomes: Food and Nutrient Intake, Supplement Intake  Physical Signs/Symptoms Outcomes: Biochemical Data, Nutrition Focused Physical Findings, Skin, Weight    Discharge Planning:    Continue current diet, Continue Oral Nutrition Supplement     Shannan Cohen, MS, RD, LD  Contact: [phone]

## 2022-12-01 NOTE — PROGRESS NOTES
Hospitalist Progress Note      PCP: [facility] Elizondo    Date of Admission: 11/28/2022    Chief Complaint: Confusion, urinary retention    Hospital Course: Presented with confusion. Found to have acute kidney injury and urinary retention. Improved after Valenzuela catheter insertion by urology. Also noted to have elevated troponin and elevated procalcitonin. On empiric antibiotics. Subjective:     Patient alert and oriented x2. He denies any complaints. No nausea vomiting. No fevers or chills. Patient denies dysuria. Patient does have Valenzuela catheter in place.       Medications:  Reviewed    Infusion Medications    sodium chloride 25 mL/hr at 12/01/22 1233    sodium chloride 75 mL/hr at 12/01/22 0607    dextrose       Scheduled Medications    magnesium oxide  400 mg Oral BID    collagenase   Topical Daily    QUEtiapine  12.5 mg Oral Daily    QUEtiapine  50 mg Oral Dinner    atorvastatin  10 mg Oral Nightly    insulin glargine  5 Units SubCUTAneous Nightly    sodium chloride flush  5-40 mL IntraVENous 2 times per day    cefepime  2,000 mg IntraVENous Q12H    insulin lispro  0-8 Units SubCUTAneous TID WC    insulin lispro  0-4 Units SubCUTAneous Nightly    vancomycin  1,250 mg IntraVENous Q24H    apixaban  5 mg Oral BID    sertraline  25 mg Oral Daily    divalproex  125 mg Oral 2 times per day     PRN Meds: magnesium sulfate, sodium chloride flush, sodium chloride, ondansetron **OR** ondansetron, acetaminophen **OR** acetaminophen, glucose, dextrose bolus **OR** dextrose bolus, glucagon (rDNA), dextrose, ziprasidone      Intake/Output Summary (Last 24 hours) at 12/1/2022 1528  Last data filed at 12/1/2022 1044  Gross per 24 hour   Intake 240 ml   Output 1350 ml   Net -1110 ml         Physical Exam Performed:    /67   Pulse 77   Temp 97.5 °F (36.4 °C) (Oral)   Resp 16   Ht 6' 4\" (1.93 m)   Wt 182 lb 1.6 oz (82.6 kg)   SpO2 96%   BMI 22.17 kg/m²     General appearance: No apparent distress, appears stated age and cooperative. HEENT: Pupils equal, round, and reactive to light. Conjunctivae/corneas clear. Neck: Supple, with full range of motion. No jugular venous distention. Trachea midline. Respiratory:  Normal respiratory effort. Clear to auscultation, bilaterally without Rales/Wheezes/Rhonchi. Cardiovascular: Regular rate and rhythm with normal S1/S2 without murmurs, rubs or gallops. Abdomen: Soft, non-tender, non-distended with normal bowel sounds. Musculoskeletal: No clubbing, cyanosis or edema bilaterally. Full range of motion without deformity. Skin: unstageable 4 cm sacral decubitus that was present on arrival.  : Valenzuela catheter in place. Neurologic:  Neurovascularly intact without any focal sensory/motor deficits. Cranial nerves: II-XII intact, grossly non-focal.  Psychiatric: Alert and pleasantly confused with hallucinations \"there was a big party here\", \"people got a lot of money\". Capillary Refill: Brisk, 3 seconds, normal   Peripheral Pulses: +2 palpable, equal bilaterally       Labs:   Recent Labs     11/29/22  0030 11/30/22  0748   WBC 21.4* 9.5   HGB 12.0* 11.6*   HCT 35.9* 34.1*    344       Recent Labs     11/29/22  0030 11/30/22  0748 12/01/22  0650    137 137   K 4.3 3.8 4.1   CL 99 101 101   CO2 26 27 21   BUN 42* 24* 18   CREATININE 1.6* 0.8 0.8   CALCIUM 9.2 8.9 8.8       Recent Labs     11/29/22  0030   AST 28   ALT 14   BILITOT 1.3*   ALKPHOS 86       No results for input(s): INR in the last 72 hours.   Recent Labs     11/29/22  1102 11/29/22  1631 11/29/22  2220   TROPONINI 0.03* 0.02* 0.02*         Urinalysis:      Lab Results   Component Value Date/Time    NITRU Negative 11/29/2022 10:50 AM    WBCUA  11/29/2022 10:50 AM    BACTERIA 4+ 11/29/2022 10:50 AM    RBCUA 5-10 11/29/2022 10:50 AM    BLOODU SMALL 11/29/2022 10:50 AM    SPECGRAV 1.020 11/29/2022 10:50 AM    GLUCOSEU Negative 11/29/2022 10:50 AM       Radiology:  CT CERVICAL SPINE WO CONTRAST   Final Result   No acute abnormality of the cervical spine. CT HEAD WO CONTRAST   Final Result   No acute intracranial abnormality. Moderate diffuse cerebral atrophy. Mild cerebral white matter chronic microvascular ischemic disease. Bilateral maxillary chronic sinusitis. XR CHEST PORTABLE   Final Result   No acute cardiopulmonary abnormality. Lung parenchymal changes suggestive of chronic interstitial lung disease. IP CONSULT TO UROLOGY  IP CONSULT TO PHARMACY  IP CONSULT TO UROLOGY  IP CONSULT TO PSYCHIATRY    Assessment/Plan:    Active Hospital Problems    Diagnosis     MIYA (acute kidney injury) ([facility] [N17.9]      Priority: Medium    Diabetic peripheral neuropathy ([facility] [E11.42]      Priority: Medium    Acute combined systolic and diastolic heart failure (HCC) [I50.41]     Alzheimer's disease ([facility] [G30.9, F02.80]     Essential hypertension [I10]     Mixed hyperlipidemia [E78.2]     BPH with obstruction/lower urinary tract symptoms [N40.1, N13.8]      PLAN    1. Probable UTI. Patient with elevated procalcitonin. Patient on empiric antibiotics. Continue supportive care. 2.  Unstageable sacral decubitus ulcer. Wound care team is following. We will continue frequent turns. 3.  Metabolic encephalopathy with underlying dementia. Patient lives in the memory care unit. Patient may need SNF placement prior to discharge back to memory care unit. We will correct underlying disorders. Continue supportive care. 4.  MIYA secondary to postobstructive uropathy? .  Patient will continue Valenzuela catheter for 1 week. Urology is following. 5.  Paroxysmal atrial fibrillation. Patient on Eliquis. Will monitor rate closely. Continue telemetry. 6.  Type 2 diabetes. Continue insulin sliding scale. Continue basal insulin. 7.  Electrolyte abnormalities. We will correct and continue to monitor. DVT Prophylaxis: Eliquis  Diet: ADULT DIET;  Regular  ADULT ORAL NUTRITION SUPPLEMENT; Breakfast, Dinner; Diabetic Oral Supplement  Code Status: Full Code  PT/OT Eval Status: Ordered    Dispo - SNF when stable.     Appropriate for A1 Discharge Unit: Bernadette Guidry MD

## 2022-12-01 NOTE — PLAN OF CARE
Problem: Discharge Planning  Goal: Discharge to home or other facility with appropriate resources  12/1/2022 1608 by Yohannes Pulido RN  Outcome: Progressing  Flowsheets (Taken 12/1/2022 1044)  Discharge to home or other facility with appropriate resources:   Identify barriers to discharge with patient and caregiver   Arrange for needed discharge resources and transportation as appropriate   Identify discharge learning needs (meds, wound care, etc)     Problem: Pain  Goal: Verbalizes/displays adequate comfort level or baseline comfort level  12/1/2022 1608 by Yohannes Pulido RN  Outcome: Progressing     Problem: Safety - Adult  Goal: Free from fall injury  12/1/2022 1608 by Yohannes Pulido RN  Outcome: Progressing     Problem: Chronic Conditions and Co-morbidities  Goal: Patient's chronic conditions and co-morbidity symptoms are monitored and maintained or improved  12/1/2022 1608 by Yohannes Pulido RN  Outcome: Progressing  Flowsheets (Taken 12/1/2022 1044)  Care Plan - Patient's Chronic Conditions and Co-Morbidity Symptoms are Monitored and Maintained or Improved: Monitor and assess patient's chronic conditions and comorbid symptoms for stability, deterioration, or improvement

## 2022-12-01 NOTE — PROGRESS NOTES
Physical Therapy  Facility/Department: [address] SURG  Physical Therapy Initial Assessment/Treatment    Name: Zayra Read  : 1950  MRN: [id number]  Date of Service: 2022    Discharge Recommendations:  Subacute/Skilled Nursing Facility   PT Equipment Recommendations  Equipment Needed: No  Other: defer to next level of care      Patient Diagnosis(es): The primary encounter diagnosis was Acute encephalopathy. Diagnoses of MIYA (acute kidney injury) ([address] Elevated procalcitonin, and Acute urinary retention were also pertinent to this visit. Past Medical History:  has a past medical history of Acute cystitis without hematuria, Arthritis, Cerebellar infarct ([address] Cerebral artery occlusion with cerebral infarction ([address] Diabetes mellitus ([address] Diabetic nephropathy with proteinuria ([address] Diabetic peripheral neuropathy ([address] Diabetic ulcer of left great toe ([address] Diabetic ulcer of left midfoot associated with type 2 diabetes mellitus ([address] Disease of blood and blood forming organ, Dizziness, Hyperlipidemia, Hypertension, Hypomagnesemia, Moderate non-proliferative diabetic retinopathy ([address] Movement disorder, and Sepsis due to urinary tract infection ([address] Past Surgical History:  has a past surgical history that includes Pilonidal cyst excision; Tonsillectomy; Hypospadius correction; Foot surgery (Right, ); other surgical history (Left, 2016); Colonoscopy; Endoscopy, colon, diagnostic; Upper gastrointestinal endoscopy (2017); and Cataract removal (19 (L) 19 (R)). Assessment   Body Structures, Functions, Activity Limitations Requiring Skilled Therapeutic Intervention: Decreased functional mobility ; Decreased cognition;Decreased coordination;Decreased endurance;Decreased ROM; Increased pain;Decreased balance;Decreased posture;Decreased strength;Decreased safe awareness  Assessment: Pt is a 67 y.o. [facility] to fall and encephalopathy.  Pt is unable to provide hx and limited social hx/PLOF listed in chart. Per CM pt resides in memory care unit at [facility], anticipate pt was likely ambulatory at baseline possibly without assistance (based on info in chart) but would benefit from clarification. Pt is currently functioning below his expected baseline demonstrating bed mobility with Latrice x 2, t/f with Latrice x 2 and gait x 20' with HHA with Latrice x 2. Pt is unsteady with gait and requires cues for safety with all mobility. Pt is limited by cognition, balance and pain. Pt will benefit from continued skilled PT in acute care setting to address above deficits. D/t pts current deficits recommend SNF at d/c. Treatment Diagnosis: Impaired balance and gait  Specific Instructions for Next Treatment: Progress mobility as tolerated  Therapy Prognosis: Fair  Decision Making: Medium Complexity  Barriers to Learning: cognition, hearing  Requires PT Follow-Up: Yes  Activity Tolerance  Activity Tolerance: Patient tolerated evaluation without incident;Patient tolerated treatment well;Patient limited by pain; Patient limited by endurance  Activity Tolerance Comments: Pt reports neck pain with mobility, impoved with return to supine, RN aware     Plan   Physcial Therapy Plan  General Plan: 3-5 times per week  Specific Instructions for Next Treatment: Progress mobility as tolerated  Current Treatment Recommendations: Strengthening, Home exercise program, Balance training, Gait training, Safety education & training, Therapeutic activities, Functional mobility training, Patient/Caregiver education & training, Neuromuscular re-education, Transfer training, Equipment evaluation, education, & procurement, Positioning, Endurance training  Safety Devices  Type of Devices: Left in bed, Bed alarm in place, All fall risk precautions in place, Gait belt, Nurse notified, Sitter present, Call light within reach, Patient at risk for falls Restrictions  Restrictions/Precautions  Restrictions/Precautions: Fall Risk, Up as Tolerated, General Precautions  Position Activity Restriction  Other position/activity restrictions: Sitter at bedside, risk of elopement, nickerson, Stage IV wound on buttocks, IV     Subjective   General  Chart Reviewed: Yes  Patient assessed for rehabilitation services?: Yes  Additional Pertinent Hx: Per Dr. Breann Loza H&P \"49 y.o. male who presents to the ED with a chief complaint of fall out of bed at his SNF, found on the ground by his nightstand. Abrasion to the back of his head. Not acting like himself for several days according to SNF staff and family at bedside. Described as increased confusion, somnolence\"  Response To Previous Treatment: Not applicable  Family / Caregiver Present: No  Referring Practitioner: Latrice Villarreal MD  Referral Date : 12/01/22  Diagnosis: Fall, encephalopathy, MIYA  Follows Commands: Impaired  General Comment  Comments: RN cleared pt for session  Subjective  Subjective: Pt resting in bed on approach, pleasantly confused, agreeable to PT evaluation         Social/Functional History  Social/Functional History  Lives With: Alone  Type of Home: Facility ([facility] in [address] [place] [address])  Additional Comments: Pt lives at [facility]. Wife states he has been there since 10/10/22. Pt appears to be ambulatory but cannot provide details on prior level of function d/t dementia.   Vision/Hearing  Vision  Vision: Impaired  Vision Exceptions: Wears glasses at all times  Hearing  Hearing: Exceptions to [facility]  Hearing Exceptions: Hard of hearing/hearing concerns    Cognition   Orientation  Overall Orientation Status: Impaired  Orientation Level: Oriented to person;Disoriented to situation;Disoriented to time;Disoriented to place (Oriented to name only)     Objective   Heart Rate: 77  Heart Rate Source: Monitor  BP: 110/67  BP Location: Left upper arm  BP Method: Automatic  [facility]  MAP (Calculated): 81  Resp: 16  SpO2: 96 %  O2 Device: None (Room air)     Observation/Palpation  Posture: Fair  Gross Assessment  AROM: Within functional limits  PROM: Within functional limits  Strength: Generally decreased, functional                 Bed Mobility Training  Bed Mobility Training: Yes  Interventions: Manual cues; Tactile cues; Verbal cues; Safety awareness training  Supine to Sit: Minimum assistance; Additional time; Adaptive equipment;Assist X2 (Latrice x 2, HOB elevated, use of BR, increased time to complete, cues for sequence)  Sit to Supine: Minimum assistance;Assist X2;Adaptive equipment; Additional time (Latrice x 2, HOB elevated, use of BR, increased time to complete, cues for sequence)    Balance  Sitting: Impaired  Sitting - Static: Good (unsupported)  Sitting - Dynamic: Good (unsupported)  Standing: Impaired  Standing - Static: Fair;Constant support  Standing - Dynamic: Poor;Constant support  Grossly SBA to CGA sitting EOB, no overt LOB. Pt requires Latrice x 2 for static to dynamic standing balance with B HHA. Transfer Training  Transfer Training: Yes  Interventions: Manual cues; Verbal cues; Weight shifting training/pressure relief  Sit to Stand: Minimum assistance;Assist X2;Additional time (from EOB without AD, provided with HHA once standing)  Stand to Sit: Minimum assistance;Assist X2;Additional time (Latrice x 2, cues for safety and initiation)  Completed multiple STS from EOB    Gait Training  Gait Training: Yes  Gait  Overall Level of Assistance: Minimum assistance;Assist X2;Additional time  Interventions: Manual cues; Safety awareness training; Tactile cues; Verbal cues; Weight shifting training/pressure relief  Base of Support: Widened  Speed/Nathalie: Slow;Shuffled  Step Length: Left shortened;Right shortened  Stance: Left increased;Right increased;Time  Gait Abnormalities: Path deviations; Shuffling gait; Decreased step clearance; Step to gait (Maintains step to pattern, B decreased toe clearance and heel strike, B decreased TKE and hip extension. Unsteady with grossly Latrice x 2 for safety and balance)  Distance (ft): 20 Feet  Assistive Device: Gait belt (B HHA)                              AM-PAC Score  AM-PAC Inpatient Mobility Raw Score : 12 (12/01/22 1640)  AM-PAC Inpatient T-Scale Score : 35.33 (12/01/22 1640)  Mobility Inpatient CMS 0-100% Score: 68.66 (12/01/22 1640)  Mobility Inpatient CMS G-Code Modifier : CL (12/01/22 1640)              Goals  Short Term Goals  Time Frame for Short Term Goals: 1 week 12/08/22 (unless otherwise specified)  Short Term Goal 1: 12/05/22: Pt will complete bed mobility with SBA  Short Term Goal 2: Pt will complete sit to/from stand with LRAD with CGA  Short Term Goal 3: Pt will ambulate 48' with LRAD with CGA without LOB  Patient Goals   Patient Goals : Pt is unable to state d/t cognition       Education  Patient Education  Education Given To: Patient;Staff  Education Provided: Role of Therapy;Plan of Care;Orientation;Precautions;Transfer Training  Education Provided Comments: Educated on safety with mobility, PCA educated on safety with bed mobility and t/f  Education Method: Demonstration;Verbal  Barriers to Learning: Cognition; Hearing  Education Outcome: Unable to demonstrate understanding      Therapy Time   Individual Concurrent Group Co-treatment   Time In 1416         Time Out 1449         Minutes 33         Timed Code Treatment Minutes: 23 Minutes (10 minutes for eval)       If pt is unable to be seen after this session, please let this note serve as discharge summary. Please see case management note for discharge disposition. Thank you.     Marta Oswald, PT, DPT

## 2022-12-01 NOTE — PROGRESS NOTES
Department of Psychiatry  Consultant Progress Note  Chief Complaint: follow-up delirium and dementia. Lightly asleep and easily arousable. Seemed to do well today and nothing documented about agitation/psychosis etc.  Tolerating meds and staff report he has been awake all day. Patient's chart was reviewed and collaborated with  about the treatment plan. .  SUBJECTIVE:    Patient is feeling better. Suicidal ideation: denies suicidal ideation  Patient denies    ROS: Patient has new complaints: no  Sleeping adequately:  Yes  Appetite adequate: yes  Attending groups: N/A  Visitors:no    OBJECTIVE    Physical  VITALS:  /67   Pulse 77   Temp 97.5 °F (36.4 °C) (Oral)   Resp 16   Ht 6' 4\" (1.93 m)   Wt 182 lb 1.6 oz (82.6 kg)   SpO2 96%   BMI 22.17 kg/m²   Patients appearance hospital attire and lying in bed.     Musculoskeletal  Patient gait not safe to test   STRENGTH: normal throughout upper and lower extremities TONE normal    Mental Status Examination:   No current loose associations  ConcentrationLimited   Thoughts are disorganized  Insight and Judgement both poor  Knowledge: good  Language: 0 - no aphasia, normal  Speech: appropriate   Mood within normal limits, affect congruent with mood  Orientation: oriented to person   Hallucinations Absent   Thought Processes: Tangential  Memory short term memory loss  suicidal ideations absent with no plan  Homicidal ideations no           Data  Labs:   Admission on 11/28/2022   Component Date Value Ref Range Status    WBC 11/29/2022 21.4 (A)  4.0 - 11.0 K/uL Final    RBC 11/29/2022 4.08 (A)  4.20 - 5.90 M/uL Final    Hemoglobin 11/29/2022 12.0 (A)  13.5 - 17.5 g/dL Final    Hematocrit 11/29/2022 35.9 (A)  40.5 - 52.5 % Final    MCV 11/29/2022 88.0  80.0 - 100.0 fL Final    MCH 11/29/2022 29.5  26.0 - 34.0 pg Final    MCHC 11/29/2022 33.6  31.0 - 36.0 g/dL Final    RDW 11/29/2022 14.8  12.4 - 15.4 % Final    Platelets 83/53/8283 405  135 - 450 K/uL Final    MPV 11/29/2022 7.4  5.0 - 10.5 fL Final    Neutrophils % 11/29/2022 88.3  % Final    Lymphocytes % 11/29/2022 3.0  % Final    Monocytes % 11/29/2022 8.4  % Final    Eosinophils % 11/29/2022 0.0  % Final    Basophils % 11/29/2022 0.3  % Final    Neutrophils Absolute 11/29/2022 18.9 (A)  1.7 - 7.7 K/uL Final    Lymphocytes Absolute 11/29/2022 0.6 (A)  1.0 - 5.1 K/uL Final    Monocytes Absolute 11/29/2022 1.8 (A)  0.0 - 1.3 K/uL Final    Eosinophils Absolute 11/29/2022 0.0  0.0 - 0.6 K/uL Final    Basophils Absolute 11/29/2022 0.1  0.0 - 0.2 K/uL Final    Sodium 11/29/2022 138  136 - 145 mmol/L Final    Potassium reflex Magnesium 11/29/2022 4.3  3.5 - 5.1 mmol/L Final    Chloride 11/29/2022 99  99 - 110 mmol/L Final    CO2 11/29/2022 26  21 - 32 mmol/L Final    Anion Gap 11/29/2022 13  3 - 16 Final    Glucose 11/29/2022 147 (A)  70 - 99 mg/dL Final    BUN 11/29/2022 42 (A)  7 - 20 mg/dL Final    Creatinine 11/29/2022 1.6 (A)  0.8 - 1.3 mg/dL Final    Est, Glom Filt Rate 11/29/2022 45 (A)  >60 Final    Comment: Pediatric calculator link  [facility].at. org/professionals/kdoqi/gfr_calculatorped  Effective Oct 3, 2022  These results are not intended for use in patients  <25years of age. eGFR results are calculated without  a race factor using the 2021 CKD-EPI equation. Careful  clinical correlation is recommended, particularly when  comparing to results calculated using previous equations. The CKD-EPI equation is less accurate in patients with  extremes of muscle mass, extra-renal metabolism of  creatinine, excessive creatinine ingestion, or following  therapy that affects renal tubular secretion.       Calcium 11/29/2022 9.2  8.3 - 10.6 mg/dL Final    Total Protein 11/29/2022 7.5  6.4 - 8.2 g/dL Final    Albumin 11/29/2022 3.8  3.4 - 5.0 g/dL Final    Albumin/Globulin Ratio 11/29/2022 1.0 (A)  1.1 - 2.2 Final    Total Bilirubin 11/29/2022 1.3 (A)  0.0 - 1.0 mg/dL Final    Alkaline Phosphatase 11/29/2022 86  40 - 129 U/L Final    ALT 11/29/2022 14  10 - 40 U/L Final    AST 11/29/2022 28  15 - 37 U/L Final    Color, UA 11/29/2022 Yellow  Straw/Yellow Final    Clarity, UA 11/29/2022 CLOUDY (A)  Clear Final    Glucose, Ur 11/29/2022 Negative  Negative mg/dL Final    Bilirubin Urine 11/29/2022 Negative  Negative Final    Ketones, Urine 11/29/2022 TRACE (A)  Negative mg/dL Final    Specific Gravity, UA 11/29/2022 1.020  1.005 - 1.030 Final    Blood, Urine 11/29/2022 SMALL (A)  Negative Final    pH, UA 11/29/2022 6.5  5.0 - 8.0 Final    Protein, UA 11/29/2022 30 (A)  Negative mg/dL Final    Urobilinogen, Urine 11/29/2022 0.2  <2.0 E.U./dL Final    Nitrite, Urine 11/29/2022 Negative  Negative Final    Leukocyte Esterase, Urine 11/29/2022 LARGE (A)  Negative Final    Microscopic Examination 11/29/2022 YES   Final    Urine Type 11/29/2022 NotGiven   Final    Urine received in a container without preservatives. Urine Reflex to Culture 11/29/2022 Yes   Final    Troponin 11/29/2022 0.04 (A)  <0.01 ng/mL Final    Methodology by Troponin T    Ventricular Rate 11/29/2022 67  BPM Final    Atrial Rate 11/29/2022 67  BPM Final    P-R Interval 11/29/2022 152  ms Final    QRS Duration 11/29/2022 102  ms Final    Q-T Interval 11/29/2022 446  ms Final    QTc Calculation (Bazett) 11/29/2022 471  ms Final    R Axis 11/29/2022 -19  degrees Final    T Axis 11/29/2022 -22  degrees Final    Diagnosis 11/29/2022 Sinus rhythm with Sinus arrythmiaPremature atrial complexesNon-specific intra-ventricular conduction delayWhen compared with ECG of 10-OCT-2019 17:18,No significant change was foundConfirmed by JULIO CESAR Patel MD ([id number]) on 11/29/2022 7:11:03 AM   Final    Blood Culture, Routine 11/29/2022 No Growth to date. Any change in status will be called. Preliminary    Culture, Blood 2 11/29/2022 No Growth to date. Any change in status will be called.    Preliminary    Procalcitonin 11/29/2022 25.64 (A)  0.00 - 0.15 ng/mL Final    Comment: Suspected Sepsis:  Low likelihood of sepsis  <.50 ng/mL    Increased likelihood of sepsis 0.50-2.00 ng/mL  Antibiotics encouraged    High risk of sepsis/shock   >2.00 ng/mL  Antibiotics strongly encouraged    Suspected Lower Respiratory Tract Infections:  Low likelihood of bacterial infection  <0.24 ng/mL    Increased likelihood of bacterial infection >0.24 ng/mL  Antibiotics encouraged    With successful antibiotic therapy, PCT levels should decrease  rapidly. (Half-life of 24 to 36 hours.)    Procalcitonin values from samples collected within the first  6 hours of systemic infection may still be low. Retesting may be indicated. Values from day 1 and day 4 can be entered into the Change in  Procalcitonin Calculator to determine the patient's  Mortality Risk Prognosis  (www.[email or web address]-calculator. com)    In healthy neonates, plasma Procalcitonin (PCT) concentrations  increase gradually after birth, reaching peak values at about  24 hours of age then decrease to normal values below 0.5                            ng/mL  by 48-72 hours of age.       Lactic Acid, Sepsis 11/29/2022 1.5  0.4 - 1.9 mmol/L Final    Troponin 11/29/2022 0.03 (A)  <0.01 ng/mL Final    Methodology by Troponin T    Troponin 11/29/2022 0.02 (A)  <0.01 ng/mL Final    Methodology by Troponin T    Hemoglobin A1C 11/29/2022 7.1  See comment % Final    Comment: Comment:  Diagnosis of Diabetes: > or = 6.5%  Increased risk of diabetes (Prediabetes): 5.7-6.4%  Glycemic Control: Nonpregnant Adults: <7.0%                    Pregnant: <6.0%        eAG 11/29/2022 157.1  mg/dL Final    POC Glucose 11/29/2022 160 (A)  70 - 99 mg/dl Final    Performed on 11/29/2022 ACCU-CHEK   Final    Troponin 11/29/2022 0.02 (A)  <0.01 ng/mL Final    Methodology by Troponin T    WBC, UA 11/29/2022  (A)  0 - 5 /HPF Final    RBC, UA 11/29/2022 5-10 (A)  0 - 4 /HPF Final    Bacteria, UA 11/29/2022 4+ (A)  None Seen /HPF Final    Organism 11/29/2022 Enterococcus faecalis (A)   Final    Urine Culture, Routine 11/29/2022 >100,000 CFU/ml   Final    POC Glucose 11/29/2022 130 (A)  70 - 99 mg/dl Final    Performed on 11/29/2022 ACCU-CHEK   Final    Procalcitonin 11/29/2022 13.51 (A)  0.00 - 0.15 ng/mL Final    Comment: Suspected Sepsis:  Low likelihood of sepsis  <.50 ng/mL    Increased likelihood of sepsis 0.50-2.00 ng/mL  Antibiotics encouraged    High risk of sepsis/shock   >2.00 ng/mL  Antibiotics strongly encouraged    Suspected Lower Respiratory Tract Infections:  Low likelihood of bacterial infection  <0.24 ng/mL    Increased likelihood of bacterial infection >0.24 ng/mL  Antibiotics encouraged    With successful antibiotic therapy, PCT levels should decrease  rapidly. (Half-life of 24 to 36 hours.)    Procalcitonin values from samples collected within the first  6 hours of systemic infection may still be low. Retesting may be indicated. Values from day 1 and day 4 can be entered into the Change in  Procalcitonin Calculator to determine the patient's  Mortality Risk Prognosis  ([email or web address]-pct-[email or web address]. com)    In healthy neonates, plasma Procalcitonin (PCT) concentrations  increase gradually after birth, reaching peak values at about  24 hours of age then decrease to normal values below 0.5                            ng/mL  by 48-72 hours of age. POC Glucose 11/29/2022 164 (A)  70 - 99 mg/dl Final    Performed on 11/29/2022 ACCU-CHEK   Final    Sodium 11/30/2022 137  136 - 145 mmol/L Final    Potassium 11/30/2022 3.8  3.5 - 5.1 mmol/L Final    Chloride 11/30/2022 101  99 - 110 mmol/L Final    CO2 11/30/2022 27  21 - 32 mmol/L Final    Anion Gap 11/30/2022 9  3 - 16 Final    Glucose 11/30/2022 146 (A)  70 - 99 mg/dL Final    BUN 11/30/2022 24 (A)  7 - 20 mg/dL Final    Creatinine 11/30/2022 0.8  0.8 - 1.3 mg/dL Final    Est, Glom Filt Rate 11/30/2022 >60  >60 Final    Comment: Pediatric calculator link  [email or web address]/gfr_calculatorped  Effective Oct 3, 2022  These results are not intended for use in patients  <25years of age. eGFR results are calculated without  a race factor using the 2021 CKD-EPI equation. Careful  clinical correlation is recommended, particularly when  comparing to results calculated using previous equations. The CKD-EPI equation is less accurate in patients with  extremes of muscle mass, extra-renal metabolism of  creatinine, excessive creatinine ingestion, or following  therapy that affects renal tubular secretion.       Calcium 11/30/2022 8.9  8.3 - 10.6 mg/dL Final    Magnesium 11/30/2022 1.20 (A)  1.80 - 2.40 mg/dL Final    WBC 11/30/2022 9.5  4.0 - 11.0 K/uL Final    RBC 11/30/2022 3.92 (A)  4.20 - 5.90 M/uL Final    Hemoglobin 11/30/2022 11.6 (A)  13.5 - 17.5 g/dL Final    Hematocrit 11/30/2022 34.1 (A)  40.5 - 52.5 % Final    MCV 11/30/2022 87.0  80.0 - 100.0 fL Final    MCH 11/30/2022 29.5  26.0 - 34.0 pg Final    MCHC 11/30/2022 34.0  31.0 - 36.0 g/dL Final    RDW 11/30/2022 14.5  12.4 - 15.4 % Final    Platelets 36/19/3861 344  135 - 450 K/uL Final    MPV 11/30/2022 7.4  5.0 - 10.5 fL Final    Neutrophils % 11/30/2022 84.3  % Final    Lymphocytes % 11/30/2022 5.0  % Final    Monocytes % 11/30/2022 10.2  % Final    Eosinophils % 11/30/2022 0.3  % Final    Basophils % 11/30/2022 0.2  % Final    Neutrophils Absolute 11/30/2022 8.0 (A)  1.7 - 7.7 K/uL Final    Lymphocytes Absolute 11/30/2022 0.5 (A)  1.0 - 5.1 K/uL Final    Monocytes Absolute 11/30/2022 1.0  0.0 - 1.3 K/uL Final    Eosinophils Absolute 11/30/2022 0.0  0.0 - 0.6 K/uL Final    Basophils Absolute 11/30/2022 0.0  0.0 - 0.2 K/uL Final    POC Glucose 11/29/2022 148 (A)  70 - 99 mg/dl Final    Performed on 11/29/2022 ACCU-CHEK   Final    Vancomycin Tr 11/30/2022 6.5 (A)  10.0 - 20.0 ug/mL Final    Comment: Trough - 10-20  Toxic  - >20.0      POC Glucose 11/30/2022 140 (A)  70 - 99 mg/dl Final    Performed on 11/30/2022 ACCU-CHEK   Final    POC Glucose 11/30/2022 213 (A)  70 - 99 mg/dl Final    Performed on 11/30/2022 ACCU-CHEK   Final    POC Glucose 11/30/2022 177 (A)  70 - 99 mg/dl Final    Performed on 11/30/2022 ACCU-CHEK   Final    Sodium 12/01/2022 137  136 - 145 mmol/L Final    Potassium 12/01/2022 4.1  3.5 - 5.1 mmol/L Final    Chloride 12/01/2022 101  99 - 110 mmol/L Final    CO2 12/01/2022 21  21 - 32 mmol/L Final    Anion Gap 12/01/2022 15  3 - 16 Final    Glucose 12/01/2022 146 (A)  70 - 99 mg/dL Final    BUN 12/01/2022 18  7 - 20 mg/dL Final    Creatinine 12/01/2022 0.8  0.8 - 1.3 mg/dL Final    Est, Glom Filt Rate 12/01/2022 >60  >60 Final    Comment: Pediatric calculator link  [employer].[email or web address]. org/[email or web address]/gfr_calculatorped  Effective Oct 3, 2022  These results are not intended for use in patients  <25years of age. eGFR results are calculated without  a race factor using the 2021 CKD-EPI equation. Careful  clinical correlation is recommended, particularly when  comparing to results calculated using previous equations. The CKD-EPI equation is less accurate in patients with  extremes of muscle mass, extra-renal metabolism of  creatinine, excessive creatinine ingestion, or following  therapy that affects renal tubular secretion. Calcium 12/01/2022 8.8  8.3 - 10.6 mg/dL Final    Magnesium 12/01/2022 1.60 (A)  1.80 - 2.40 mg/dL Final    Procalcitonin 12/01/2022 2.94 (A)  0.00 - 0.15 ng/mL Final    Comment: Suspected Sepsis:  Low likelihood of sepsis  <.50 ng/mL    Increased likelihood of sepsis 0.50-2.00 ng/mL  Antibiotics encouraged    High risk of sepsis/shock   >2.00 ng/mL  Antibiotics strongly encouraged    Suspected Lower Respiratory Tract Infections:  Low likelihood of bacterial infection  <0.24 ng/mL    Increased likelihood of bacterial infection >0.24 ng/mL  Antibiotics encouraged    With successful antibiotic therapy, PCT levels should decrease  rapidly.  (Half-life of 24 to 36 hours.)    Procalcitonin values from samples collected within the first  6 hours of systemic infection may still be low. Retesting may be indicated. Values from day 1 and day 4 can be entered into the Change in  Procalcitonin Calculator to determine the patient's  Mortality Risk Prognosis  ([email or web address]-pct-[email or web address]. com)    In healthy neonates, plasma Procalcitonin (PCT) concentrations  increase gradually after birth, reaching peak values at about  24 hours of age then decrease to normal values below 0.5                            ng/mL  by 48-72 hours of age.       POC Glucose 11/30/2022 160 (A)  70 - 99 mg/dl Final    Performed on 11/30/2022 ACCU-CHEK   Final    POC Glucose 12/01/2022 140 (A)  70 - 99 mg/dl Final    Performed on 12/01/2022 ACCU-CHEK   Final    POC Glucose 12/01/2022 246 (A)  70 - 99 mg/dl Final    Performed on 12/01/2022 ACCU-CHEK   Final            Medications  Current Facility-Administered Medications: magnesium oxide (MAG-OX) tablet 400 mg, 400 mg, Oral, BID  magnesium sulfate 2000 mg in 50 mL IVPB premix, 2,000 mg, IntraVENous, PRN  collagenase ointment, , Topical, Daily  QUEtiapine (SEROQUEL) tablet 12.5 mg, 12.5 mg, Oral, Daily  QUEtiapine (SEROQUEL) tablet 50 mg, 50 mg, Oral, Dinner  atorvastatin (LIPITOR) tablet 10 mg, 10 mg, Oral, Nightly  insulin glargine (LANTUS) injection vial 5 Units, 5 Units, SubCUTAneous, Nightly  sodium chloride flush 0.9 % injection 5-40 mL, 5-40 mL, IntraVENous, 2 times per day  sodium chloride flush 0.9 % injection 5-40 mL, 5-40 mL, IntraVENous, PRN  0.9 % sodium chloride infusion, , IntraVENous, PRN  ondansetron (ZOFRAN-ODT) disintegrating tablet 4 mg, 4 mg, Oral, Q8H PRN **OR** ondansetron (ZOFRAN) injection 4 mg, 4 mg, IntraVENous, Q6H PRN  acetaminophen (TYLENOL) tablet 650 mg, 650 mg, Oral, Q6H PRN **OR** acetaminophen (TYLENOL) suppository 650 mg, 650 mg, Rectal, Q6H PRN  cefepime (MAXIPIME) 2000 mg IVPB minibag, 2,000 mg, IntraVENous, Q12H  0.9 % sodium chloride infusion, , IntraVENous, Continuous  glucose chewable tablet 16 g, 4 tablet, Oral, PRN  dextrose bolus 10% 125 mL, 125 mL, IntraVENous, PRN **OR** dextrose bolus 10% 250 mL, 250 mL, IntraVENous, PRN  glucagon (rDNA) injection 1 mg, 1 mg, SubCUTAneous, PRN  dextrose 10 % infusion, , IntraVENous, Continuous PRN  insulin lispro (HUMALOG) injection vial 0-8 Units, 0-8 Units, SubCUTAneous, TID WC  insulin lispro (HUMALOG) injection vial 0-4 Units, 0-4 Units, SubCUTAneous, Nightly  vancomycin (VANCOCIN) 1,250 mg in dextrose 5 % 250 mL IVPB, 1,250 mg, IntraVENous, Q24H  apixaban (ELIQUIS) tablet 5 mg, 5 mg, Oral, BID  sertraline (ZOLOFT) tablet 25 mg, 25 mg, Oral, Daily  divalproex (DEPAKOTE SPRINKLE) capsule 125 mg, 125 mg, Oral, 2 times per day  ziprasidone (GEODON) injection 10 mg, 10 mg, IntraMUSCular, Q8H PRN    ASSESSMENT AND PLAN    PRIMARY PSYCH DIAGNOSIS: delirium resolving                                                        dementia    Active Problems:    Diabetic peripheral neuropathy (HCC)    MIYA (acute kidney injury) ([facility] [address]    BPH with obstruction/lower urinary tract symptoms    Essential hypertension    Mixed hyperlipidemia    Alzheimer's disease ([address]    Acute combined systolic and diastolic heart failure ([address]  Resolved Problems:    * No resolved hospital problems. *       1. Patient s symptoms better. I would keep the current dose of the seroquel. He can be discharged once he is medically stable. 2.Probable discharge soon   3. Discharge planning is complete  4 Suicidal ideation is denies suicidal ideation  5 total time 40 minutes more than 50% was spent in direct time with the patient     Cris Murillo MD

## 2022-12-01 NOTE — PLAN OF CARE
Patient's EF (Ejection Fraction) is 40%    Heart Failure Medications:  Diuretics[de-identified] None    (One of the following REQUIRED for EF </= 40%/SYSTOLIC FAILURE but MAY be used in EF% >40%/DIASTOLIC FAILURE)        ACE[de-identified] None        ARB[de-identified] None         ARNI[de-identified] None    (Beta Blockers)  NON- Evidenced Based Beta Blocker (for EF% >40%/DIASTOLIC FAILURE): None    Evidenced Based Beta Blocker::(REQUIRED for EF% <40%/SYSTOLIC FAILURE) None  . .................................................................................................................................................. Patient's weights and intake/output reviewed: Yes    Patient's Last Weight: 182 lbs obtained by bed scale. Difference of 1 lbs less than last documented weight. Intake/Output Summary (Last 24 hours) at 12/1/2022 0529  Last data filed at 12/1/2022 0138  Gross per 24 hour   Intake 120 ml   Output 1400 ml   Net -1280 ml       Education Booklet Provided: yes    Comorbidities Reviewed Yes    Patient has a past medical history of Acute cystitis without hematuria, Arthritis, Cerebellar infarct ([address] Cerebral artery occlusion with cerebral infarction ([address] Diabetes mellitus ([address] Diabetic nephropathy with proteinuria ([address] Diabetic peripheral neuropathy ([address] Diabetic ulcer of left great toe ([address] Diabetic ulcer of left midfoot associated with type 2 diabetes mellitus ([address] Disease of blood and blood forming organ, Dizziness, Hyperlipidemia, Hypertension, Hypomagnesemia, Moderate non-proliferative diabetic retinopathy ([address] Movement disorder, and Sepsis due to urinary tract infection ([facility] [address] >>For CHF and Comorbidity documentation on Education Time and Topics, please see Education Tab    Progressive Mobility Assessment:  What is this patient's Current Level of Mobility?: Ambulatory- with Assistance  How was this patient Mobilized today?: Patient Refuses to Mobilize, ambulated 0 ft                 With Whom?  Nurse and PCA Level of Difficulty/Assistance: 1x Assist     Pt resting in bed at this time on room air. Pt denies shortness of breath. Pt without lower extremity edema.      Patient and/or Family's stated Goal of Care this Admission: increase activity tolerance and be more comfortable prior to discharge        :

## 2022-12-01 NOTE — PROGRESS NOTES
Occupational Therapy  Facility/Department: [place] [address] - [facility]  Occupational Therapy Initial Assessment and Treatment Note     Name: Magda Banks  : 1950  MRN: [id number]  Date of Service: 2022    Discharge Recommendations:  [address]      Completed co-tx with PT to safely address ADLs and mobility d/t extent of performance deficits. Patient Diagnosis(es): The primary encounter diagnosis was Acute encephalopathy. Diagnoses of MIYA (acute kidney injury) ([address] Elevated procalcitonin, and Acute urinary retention were also pertinent to this visit. Past Medical History:  has a past medical history of Acute cystitis without hematuria, Arthritis, Cerebellar infarct ([address] Cerebral artery occlusion with cerebral infarction ([address] Diabetes mellitus ([address] Diabetic nephropathy with proteinuria ([address] Diabetic peripheral neuropathy ([address] Diabetic ulcer of left great toe ([address] Diabetic ulcer of left midfoot associated with type 2 diabetes mellitus ([address] Disease of blood and blood forming organ, Dizziness, Hyperlipidemia, Hypertension, Hypomagnesemia, Moderate non-proliferative diabetic retinopathy ([address] Movement disorder, and Sepsis due to urinary tract infection ([address] Past Surgical History:  has a past surgical history that includes Pilonidal cyst excision; Tonsillectomy; Hypospadius correction; Foot surgery (Right, ); other surgical history (Left, 2016); Colonoscopy; Endoscopy, colon, diagnostic; Upper gastrointestinal endoscopy (2017); and Cataract removal (19 (L) 19 (R)). Assessment   Performance deficits / Impairments: Decreased functional mobility ; Decreased ADL status; Decreased cognition;Decreased safe awareness;Decreased balance;Decreased endurance;Decreased posture  Assessment: Pt admitted for MIYA and fall from memory care unit. Pt seen for OT eval and tx. Extensive assist for basic ADLs d/t cognition and low endurance.  He required min x2 for standing balance and taking steps in area near bed with hand held assist. Recommend SNF for skilled OT d/t decline from baseline. Cont OT in acute care  Prognosis: Fair  Decision Making: High Complexity  REQUIRES OT FOLLOW-UP: Yes  Activity Tolerance  Activity Tolerance: Treatment limited secondary to decreased cognition      Plan   Occupational Therapy Plan  Times Per Week: 2-3x     Restrictions  Restrictions/Precautions  Restrictions/Precautions: Fall Risk, Up as Tolerated  Position Activity Restriction  Other position/activity restrictions: Sitter at bedside, risk of elopement, nickerson, Stage IV wound on buttocks    Subjective   General  Chart Reviewed: Yes  Patient assessed for rehabilitation services?: Yes  Additional Pertinent Hx: Hx dementia  Family / Caregiver Present: No  Referring Practitioner: JANELLE Dumont  Diagnosis: MIYA, fall  Subjective  Subjective: Pt agreeable to OT. Denies pain  General Comment  Comments: RN approved therapy     Social/Functional History  Social/Functional History  Lives With: Alone  Type of Home: Facility ([facility] in [address] [place] [address])  Additional Comments: Pt lives at [facility]. Wife states he has been there since 10/10/22. Pt appears to be ambulatory but cannot provide details on prior level of function d/t dementia. Objective   Heart Rate: 77  Heart Rate Source: Monitor  BP: 110/67  BP Location: Left upper arm  BP Method: Automatic  Patient Position: Semi fowlers  MAP (Calculated): 81  Resp: 16  SpO2: 96 %  O2 Device: None (Room air)         Safety Devices  Type of Devices: Left in bed;Bed alarm in place; All fall risk precautions in place;Gait belt;Nurse notified;Sitter present;Call light within reach; Patient at risk for falls        AROM: Within functional limits  Strength: Within functional limits  Coordination: Within functional limits  Tone: Normal  Sensation: Intact  ADL  Feeding: Supervision;Setup  UE Dressing:  Moderate assistance  UE Dressing Skilled Clinical Factors: gown  LE Dressing: Dependent/Total  Toileting: Dependent/Total      Bed mobility  Supine to Sit: Minimal assistance;2 Person assistance (HOB elevated, max cues to initiate activity)  Sit to Supine: Minimal assistance;2 Person assistance  Transfers  Stand Pivot Transfers: Minimal assistance;2 Person assistance  Sit to stand: Minimal assistance;2 Person assistance  Stand to sit: Minimal assistance;2 Person assistance  Transfer Comments: Unsteady on her feet. Provided hand-held assist to take steps to end of bed and returned to bed. Cognition  Overall Cognitive Status: Exceptions  Arousal/Alertness: Appropriate responses to stimuli  Following Commands: Follows one step commands with repetition  Attention Span: Attends with cues to redirect; Difficulty attending to directions  Memory: Decreased recall of recent events;Decreased short term memory;Decreased recall of biographical Information;Decreased long term memory  Safety Judgement: Decreased awareness of need for assistance;Decreased awareness of need for safety  Problem Solving: Assistance required to identify errors made;Assistance required to correct errors made;Assistance required to implement solutions;Assistance required to generate solutions;Decreased awareness of errors  Insights: Not aware of deficits  Initiation: Requires cues for all  Sequencing: Requires cues for all  Cognition Comment: Dementia  Orientation  Overall Orientation Status: Impaired  Orientation Level: Oriented to person;Disoriented to situation;Disoriented to time;Disoriented to place (Not able to provide birthdate)          Education Given To: Patient  Education Provided: Role of Therapy;Transfer Training;Plan of Care;Orientation; ADL Adaptive Strategies  Education Method: Verbal  Barriers to Learning: Cognition  Education Outcome: Continued education needed   Disease Specific Education: Pt educated on importance of OOB mobility, prevention of complications of bedrest, and general safety during hospitalization. Pt verbalized understanding    AM-PAC Score   AM-PAC Inpatient Daily Activity Raw Score: 12 (12/01/22 1547)  AM-PAC Inpatient ADL T-Scale Score : 30.6 (12/01/22 1547)  ADL Inpatient CMS 0-100% Score: 66.57 (12/01/22 1547)  ADL Inpatient CMS G-Code Modifier : CL (12/01/22 1547)    Goals  Short Term Goals  Time Frame for Short Term Goals: 1 week (12/8) unless noted  Short Term Goal 1: Perform functional transfers with min A  Short Term Goal 2: Perform 1-2 grooming tasks while seated with SBA  Short Term Goal 3: Perform UE exer 15x each for endurance by 12/5  Patient Goals   Patient goals : Pt did not provide     Therapy Time   Individual Concurrent Group Co-treatment   Time In 1416         Time Out 1449         Minutes 33         Timed Code Treatment Minutes: 23 Minutes (10 min eval)   If pt is discharged prior to next OT session, this note will serve as the discharge summary.   Armida Sims OT

## 2022-12-01 NOTE — PLAN OF CARE
Problem: Discharge Planning  Goal: Discharge to home or other facility with appropriate resources  Outcome: Progressing     Problem: Pain  Goal: Verbalizes/displays adequate comfort level or baseline comfort level  Outcome: Progressing     Problem: Safety - Medical Restraint  Goal: Remains free of injury from restraints (Restraint for Interference with Medical Device)  Description: INTERVENTIONS:  1. Determine that other, less restrictive measures have been tried or would not be effective before applying the restraint  2. Evaluate the patient's condition at the time of restraint application  3. Inform patient/family regarding the reason for restraint  4. Q2H: Monitor safety, psychosocial status, comfort, nutrition and hydration  Outcome: Progressing     Problem: Safety - Adult  Goal: Free from fall injury  Outcome: Progressing     Problem: Chronic Conditions and Co-morbidities  Goal: Patient's chronic conditions and co-morbidity symptoms are monitored and maintained or improved  Outcome: Progressing     Problem: Skin/Tissue Integrity  Goal: Absence of new skin breakdown  Description: 1. Monitor for areas of redness and/or skin breakdown  2. Assess vascular access sites hourly  3. Every 4-6 hours minimum:  Change oxygen saturation probe site  4. Every 4-6 hours:  If on nasal continuous positive airway pressure, respiratory therapy assess nares and determine need for appliance change or resting period.   Outcome: Progressing     Problem: Cardiovascular - Adult  Goal: Maintains optimal cardiac output and hemodynamic stability  Outcome: Progressing  Goal: Absence of cardiac dysrhythmias or at baseline  Outcome: Progressing     Problem: Metabolic/Fluid and Electrolytes - Adult  Goal: Electrolytes maintained within normal limits  Outcome: Progressing  Goal: Hemodynamic stability and optimal renal function maintained  Outcome: Progressing  Goal: Glucose maintained within prescribed range  Outcome: Progressing Problem: Hematologic - Adult  Goal: Maintains hematologic stability  Outcome: Progressing

## 2022-12-01 NOTE — PROGRESS NOTES
Urology Progress Note    HPI: 66M with hypospadias admitted with MIYA, UTI, and urinary retention     Subjective: Sage Craven is sleeping, I did not wake him. Vitals:  BP (!) 148/66   Pulse 61   Temp 97.4 °F (36.3 °C) (Oral)   Resp 16   Ht 6' 4\" (1.93 m)   Wt 182 lb 1.6 oz (82.6 kg)   SpO2 97%   BMI 22.17 kg/m²   Temp  Av.8 °F (36.6 °C)  Min: 97.4 °F (36.3 °C)  Max: 98.1 °F (36.7 °C)    Intake/Output Summary (Last 24 hours) at 2022 1117  Last data filed at 2022 1044  Gross per 24 hour   Intake 120 ml   Output 1350 ml   Net -1230 ml         Exam   Physical:  Well developed, well nourished in no acute distress     Male :  Valenzuela catheter draining yellow urine     Labs:  WBC:    Lab Results   Component Value Date/Time    WBC 9.5 2022 07:48 AM     Hemoglobin/Hematocrit:    Lab Results   Component Value Date/Time    HGB 11.6 2022 07:48 AM    HCT 34.1 2022 07:48 AM     BMP:    Lab Results   Component Value Date/Time     2022 06:50 AM    K 4.1 2022 06:50 AM    K 4.3 2022 12:30 AM     2022 06:50 AM    CO2 21 2022 06:50 AM    BUN 18 2022 06:50 AM    LABALBU 3.8 2022 12:30 AM    CREATININE 0.8 2022 06:50 AM    CALCIUM 8.8 2022 06:50 AM    GFRAA >60 2020 12:27 PM    GFRAA >60 2013 11:15 AM    LABGLOM >60 2022 06:50 AM     PT/INR:    Lab Results   Component Value Date/Time    PROTIME 12.3 2018 04:38 AM    INR 1.09 2018 04:38 AM     PTT:    Lab Results   Component Value Date/Time    APTT 27.5 2018 04:38 AM   [APTT    Urinalysis:       Latest Reference Range & Units 22 10:50   Color, UA Straw/Yellow  Yellow   Clarity, UA Clear  CLOUDY ! Glucose, UA Negative mg/dL Negative   Bilirubin, Urine Negative  Negative   Ketones, Urine Negative mg/dL TRACE !    Specific Gravity, UA 1.005 - 1.030  1.020   Blood, Urine Negative  SMALL !   pH, UA 5.0 - 8.0  6.5   Protein, UA Negative mg/dL 30 ! Urobilinogen, Urine <2.0 E.U./dL 0.2   Nitrite, Urine Negative  Negative   Leukocyte Esterase, Urine Negative  LARGE ! Urine Type  NotGiven   Urine Reflex to Culture  Yes   WBC, UA 0 - 5 /HPF  ! RBC, UA 0 - 4 /HPF 5-10 ! Bacteria, UA None Seen /HPF 4+ ! Microscopic Examination  YES     Urine cx: Enterococcus faecalis      Antibiotic Therapy: Cefepime        Impression/Plan:      Urinary retention  Hypospadias  MIYA   BPH  UTI- Enterococcus      - Complex nickerson placement completed by Dr. Karen Kruse at bedside 11/29/22.   - Monitor UOP and kidney function response - good   - culture directed abx for UTI   - consider adding flomax for BPH if BP remains stable    - Nickerson should remain in place for 1 week. I discussed this with his wife and she prefers to have the nursing facility where patient resides removed it on 12/7/22 as opposed to bringing patient to our office.   we will arrange follow up in our office in 6 weeks to see Dr. Lopez Brito     - signing off, please call with questions     Mariano Abernathy PA-C  [facility]

## 2022-12-02 LAB
ANION GAP SERPL CALCULATED.3IONS-SCNC: 7 MMOL/L (ref 3–16)
BUN BLDV-MCNC: 14 MG/DL (ref 7–20)
CALCIUM SERPL-MCNC: 8.9 MG/DL (ref 8.3–10.6)
CHLORIDE BLD-SCNC: 100 MMOL/L (ref 99–110)
CO2: 30 MMOL/L (ref 21–32)
CREAT SERPL-MCNC: 0.7 MG/DL (ref 0.8–1.3)
GFR SERPL CREATININE-BSD FRML MDRD: >60 ML/MIN/{1.73_M2}
GLUCOSE BLD-MCNC: 146 MG/DL (ref 70–99)
GLUCOSE BLD-MCNC: 146 MG/DL (ref 70–99)
GLUCOSE BLD-MCNC: 164 MG/DL (ref 70–99)
GLUCOSE BLD-MCNC: 181 MG/DL (ref 70–99)
GLUCOSE BLD-MCNC: 295 MG/DL (ref 70–99)
MAGNESIUM: 1.7 MG/DL (ref 1.8–2.4)
PERFORMED ON: ABNORMAL
POTASSIUM SERPL-SCNC: 4 MMOL/L (ref 3.5–5.1)
PRO-BNP: 604 PG/ML (ref 0–124)
SODIUM BLD-SCNC: 137 MMOL/L (ref 136–145)

## 2022-12-02 PROCEDURE — 6360000002 HC RX W HCPCS: Performed by: NURSE PRACTITIONER

## 2022-12-02 PROCEDURE — 51702 INSERT TEMP BLADDER CATH: CPT

## 2022-12-02 PROCEDURE — 1200000000 HC SEMI PRIVATE

## 2022-12-02 PROCEDURE — 6370000000 HC RX 637 (ALT 250 FOR IP): Performed by: PSYCHIATRY & NEUROLOGY

## 2022-12-02 PROCEDURE — 6370000000 HC RX 637 (ALT 250 FOR IP): Performed by: INTERNAL MEDICINE

## 2022-12-02 PROCEDURE — 6370000000 HC RX 637 (ALT 250 FOR IP): Performed by: NURSE PRACTITIONER

## 2022-12-02 PROCEDURE — 2580000003 HC RX 258: Performed by: INTERNAL MEDICINE

## 2022-12-02 PROCEDURE — 99223 1ST HOSP IP/OBS HIGH 75: CPT | Performed by: PSYCHIATRY & NEUROLOGY

## 2022-12-02 PROCEDURE — 6360000002 HC RX W HCPCS: Performed by: INTERNAL MEDICINE

## 2022-12-02 PROCEDURE — 80048 BASIC METABOLIC PNL TOTAL CA: CPT

## 2022-12-02 PROCEDURE — 83880 ASSAY OF NATRIURETIC PEPTIDE: CPT

## 2022-12-02 PROCEDURE — 83735 ASSAY OF MAGNESIUM: CPT

## 2022-12-02 PROCEDURE — 36415 COLL VENOUS BLD VENIPUNCTURE: CPT

## 2022-12-02 PROCEDURE — 2580000003 HC RX 258: Performed by: NURSE PRACTITIONER

## 2022-12-02 RX ORDER — LANOLIN ALCOHOL/MO/W.PET/CERES
400 CREAM (GRAM) TOPICAL 2 TIMES DAILY
Status: COMPLETED | OUTPATIENT
Start: 2022-12-02 | End: 2022-12-02

## 2022-12-02 RX ORDER — NITROFURANTOIN 25; 75 MG/1; MG/1
100 CAPSULE ORAL EVERY 12 HOURS SCHEDULED
Status: COMPLETED | OUTPATIENT
Start: 2022-12-02 | End: 2022-12-07

## 2022-12-02 RX ADMIN — Medication 400 MG: at 21:08

## 2022-12-02 RX ADMIN — INSULIN LISPRO 4 UNITS: 100 INJECTION, SOLUTION INTRAVENOUS; SUBCUTANEOUS at 13:02

## 2022-12-02 RX ADMIN — DIVALPROEX SODIUM 125 MG: 125 CAPSULE, COATED PELLETS ORAL at 09:00

## 2022-12-02 RX ADMIN — COLLAGENASE SANTYL: 250 OINTMENT TOPICAL at 15:40

## 2022-12-02 RX ADMIN — APIXABAN 5 MG: 5 TABLET, FILM COATED ORAL at 09:00

## 2022-12-02 RX ADMIN — SERTRALINE HYDROCHLORIDE 25 MG: 50 TABLET ORAL at 09:00

## 2022-12-02 RX ADMIN — VANCOMYCIN HYDROCHLORIDE 1250 MG: 10 INJECTION, POWDER, LYOPHILIZED, FOR SOLUTION INTRAVENOUS at 15:40

## 2022-12-02 RX ADMIN — SODIUM CHLORIDE: 9 INJECTION, SOLUTION INTRAVENOUS at 10:56

## 2022-12-02 RX ADMIN — SODIUM CHLORIDE: 9 INJECTION, SOLUTION INTRAVENOUS at 15:38

## 2022-12-02 RX ADMIN — ATORVASTATIN CALCIUM 10 MG: 10 TABLET, FILM COATED ORAL at 21:01

## 2022-12-02 RX ADMIN — DIVALPROEX SODIUM 125 MG: 125 CAPSULE, COATED PELLETS ORAL at 21:01

## 2022-12-02 RX ADMIN — QUETIAPINE FUMARATE 12.5 MG: 25 TABLET ORAL at 09:00

## 2022-12-02 RX ADMIN — APIXABAN 5 MG: 5 TABLET, FILM COATED ORAL at 21:01

## 2022-12-02 RX ADMIN — NITROFURANTOIN MONOHYDRATE/MACROCRYSTALS 100 MG: 75; 25 CAPSULE ORAL at 21:09

## 2022-12-02 RX ADMIN — INSULIN GLARGINE 5 UNITS: 100 INJECTION, SOLUTION SUBCUTANEOUS at 21:10

## 2022-12-02 RX ADMIN — Medication 400 MG: at 10:51

## 2022-12-02 RX ADMIN — SODIUM CHLORIDE, PRESERVATIVE FREE 10 ML: 5 INJECTION INTRAVENOUS at 21:02

## 2022-12-02 RX ADMIN — CEFEPIME 2000 MG: 2 INJECTION, POWDER, FOR SOLUTION INTRAVENOUS at 10:57

## 2022-12-02 RX ADMIN — QUETIAPINE FUMARATE 50 MG: 25 TABLET ORAL at 17:39

## 2022-12-02 RX ADMIN — SODIUM CHLORIDE, PRESERVATIVE FREE 10 ML: 5 INJECTION INTRAVENOUS at 09:02

## 2022-12-02 ASSESSMENT — PAIN SCALES - WONG BAKER
WONGBAKER_NUMERICALRESPONSE: 0

## 2022-12-02 ASSESSMENT — PAIN SCALES - GENERAL
PAINLEVEL_OUTOF10: 0

## 2022-12-02 ASSESSMENT — PAIN SCALES - PAIN ASSESSMENT IN ADVANCED DEMENTIA (PAINAD)
CONSOLABILITY: 0
CONSOLABILITY: 0
FACIALEXPRESSION: 0
TOTALSCORE: 0
FACIALEXPRESSION: 0
NEGVOCALIZATION: 0
FACIALEXPRESSION: 0
BREATHING: 0
NEGVOCALIZATION: 0
FACIALEXPRESSION: 0
BREATHING: 0
CONSOLABILITY: 0
BODYLANGUAGE: 0
CONSOLABILITY: 0
NEGVOCALIZATION: 0
BREATHING: 0
TOTALSCORE: 0
BODYLANGUAGE: 0
BODYLANGUAGE: 0
NEGVOCALIZATION: 0
NEGVOCALIZATION: 0
TOTALSCORE: 0
BODYLANGUAGE: 0
FACIALEXPRESSION: 0
BREATHING: 0
BODYLANGUAGE: 0
TOTALSCORE: 0
BREATHING: 0
CONSOLABILITY: 0
TOTALSCORE: 0

## 2022-12-02 NOTE — CONSULTS
[address] [place] [address]                                  CONSULTATION    PATIENT NAME: Sadfa Lee                      :        1950  MED REC NO:   [id number]                          ROOM:       8789  ACCOUNT NO:   [de-identified]                           ADMIT DATE: 2022  PROVIDER:     Ankur Solis MD    CONSULT DATE:  2022    PSYCHIATRIC CONSULTATION    REQUESTING PHYSICIAN:  Aby Hurtado MD    HISTORY OF PRESENT ILLNESS:  The patient is a 74-year-old male who has a  history of dementia and is living in long-term care, who was referred in  after he was found by the staff there to have fallen and the nightstand  near his bed has slipped over on top of him. Psychiatry was asked to  evaluate the patient because he had become very agitated and required  restraints and Geodon p.r.n. His wife was present for the assessment  and the patient immediately was not able to really respond to questions  about how he got here or what was going on, stating \"I love you\" when I  explained who I was and what I was doing. He was not aware that he was  in a hospital or the circumstances around his admission. His wife did  indicate that typically he would know the year if not the month and  would be aware of his surroundings, but since he has been admitted here  that has not been the case. She does state that his dementia has slowly  progressed over the past couple of years and currently is being seen by  Dr. Debra Dyer through the [facility], and he had  started Seroquel 12.5 mg in the morning and a little bit higher dose 25  mg at 3 to 5 p.m. He is also taking Depakote and Zoloft. PAST PSYCHIATRIC HISTORY:  I cannot find that the patient has ever been  admitted to an inpatient psychiatric unit, and he has no history of  suicide attempts.     FAMILY PSYCHIATRIC HISTORY:  Negative for completed suicides. SOCIAL HISTORY:  The patient is living in long-term care. He has been  with his wife for a long time. They have children who are very  involved. The patient does not have any history of alcohol or drug  dependency issues, and there is no documentation of any trauma. REVIEW OF SYSTEMS:  A 10-system review was completed. The pertinent  positives are noted above, the rest are negative, although again the  patient is not believed to be an appropriate self-historian. PHYSICAL EXAMINATION:  VITAL SIGNS:  His temperature was 98.8, his respirations were 16, his  blood pressure was 125/90. GENERAL APPEARANCE:  The patient appeared to be a male who looked his  stated age. He was awake and alert and cooperative. He was still on  soft restraints when I saw him. NEUROMUSCULAR EXAM:  The patient appears to be of normal muscle strength  and tone throughout. His gait, the patient was in restraints and could  not be ambulated. MENTAL STATUS EXAM:  The patient presents as noted above. His thought  processes are dominated by the delirium picture complicated by a  baseline history of dementia. He is not able to really process  information, and he was actively hallucinating while I was in the room  looking up at the ceiling and talking about the men who were stacking  boxes. The patient's mood has been agitated at times here in the  hospital, although typically that is not the case. His affect was  consistent with his mood. There were verbalizations regarding suicide  or homicide. He is certainly alert, although his orientation is only to  person at this time. His speech was spontaneous, although not always  goal-directed. His short-term memory is obviously impaired. His  overall fund of knowledge is good. His attention span is impaired. Insight and judgement absent. DIAGNOSES:  AXIS I:  1. Delirium. 2.  Dementia - moderate. AXIS II:  No diagnosis.   AXIS III:  Status post fall.  AXIS IV:  Severe. AXIS V:  Current GAF 35 to 40, highest in the past year the same. RECOMMENDATIONS:  1. The patient is actively hallucinating, much of which I believe is  probably more about delirium than dementia. However, I will adjust his  Seroquel up and see if we can reduce the agitation and get the patient  to successfully come out of the restraints. I do not believe that he  will need an inpatient Geropsychiatry admission at this point and can  continue to see Dr. Rosmery Aguayo at the [facility]  upon discharge. 2.  More than 70 minutes was spent on the consultation, more than half  of which was in direct patient care and included care coordination and  treatment planning.         Dorena Lesch, MD    D: 12/02/2022 11:43:07       T: 12/02/2022 14:01:42     [id number]  Job#: [id number]     Doc#: [id number]    CC:

## 2022-12-02 NOTE — PLAN OF CARE
Problem: Discharge Planning  Goal: Discharge to home or other facility with appropriate resources  12/2/2022 0521 by Arabella Devries RN  Outcome: Progressing    Problem: Pain  Goal: Verbalizes/displays adequate comfort level or baseline comfort level  12/2/2022 0521 by Arabella Devries RN  Outcome: Progressing     Problem: Safety - Adult  Goal: Free from fall injury  12/2/2022 0521 by Arabella Devries RN  Outcome: Progressing  Flowsheets (Taken 12/2/2022 0521)  Free From Fall Injury: Instruct family/caregiver on patient safety  Note: Sitter at bedside     Problem: Chronic Conditions and Co-morbidities  Goal: Patient's chronic conditions and co-morbidity symptoms are monitored and maintained or improved  12/2/2022 0521 by Arabella Devries RN  Outcome: Progressing     Problem: Skin/Tissue Integrity  Goal: Absence of new skin breakdown  12/2/2022 0521 by Arabella Devries RN  Outcome: Progressing     Problem: Cardiovascular - Adult  Goal: Maintains optimal cardiac output and hemodynamic stability  Outcome: Progressing  Goal: Absence of cardiac dysrhythmias or at baseline  Outcome: Progressing  Absence of cardiac dysrhythmias or at baseline: Monitor cardiac rate and rhythm     Problem: Metabolic/Fluid and Electrolytes - Adult  Goal: Electrolytes maintained within normal limits  Outcome: Progressing  Goal: Hemodynamic stability and optimal renal function maintained  Outcome: Progressing  Goal: Glucose maintained within prescribed range  Outcome: Progressing     Problem: Hematologic - Adult  Goal: Maintains hematologic stability  Outcome: Progressing     Problem: Nutrition Deficit:  Goal: Optimize nutritional status  Outcome: Progressing

## 2022-12-02 NOTE — PLAN OF CARE
Patient's EF (Ejection Fraction) is 40%    Heart Failure Medications:  Diuretics[de-identified] None    (One of the following REQUIRED for EF </= 40%/SYSTOLIC FAILURE but MAY be used in EF% >40%/DIASTOLIC FAILURE)        ACE[de-identified] None        ARB[de-identified] None         ARNI[de-identified] None    (Beta Blockers)  NON- Evidenced Based Beta Blocker (for EF% >40%/DIASTOLIC FAILURE): None    Evidenced Based Beta Blocker::(REQUIRED for EF% <40%/SYSTOLIC FAILURE) None  . .................................................................................................................................................. Patient's weights and intake/output reviewed: Yes    Patient's Last Weight: 183 lbs obtained by bed scale. Difference of 1 lbs more than last documented weight. Intake/Output Summary (Last 24 hours) at 12/2/2022 0523  Last data filed at 12/2/2022 6802  Gross per 24 hour   Intake 490 ml   Output 2000 ml   Net -1510 ml       Education Booklet Provided: yes    Comorbidities Reviewed Yes    Patient has a past medical history of Acute cystitis without hematuria, Arthritis, Cerebellar infarct ([address] Cerebral artery occlusion with cerebral infarction ([address] Diabetes mellitus ([address] Diabetic nephropathy with proteinuria ([address] Diabetic peripheral neuropathy ([address] Diabetic ulcer of left great toe ([address] Diabetic ulcer of left midfoot associated with type 2 diabetes mellitus ([address] Disease of blood and blood forming organ, Dizziness, Hyperlipidemia, Hypertension, Hypomagnesemia, Moderate non-proliferative diabetic retinopathy ([address] Movement disorder, and Sepsis due to urinary tract infection ([facility] >>For CHF and Comorbidity documentation on Education Time and Topics, please see Education Tab    Progressive Mobility Assessment:  What is this patient's Current Level of Mobility?: Ambulatory- with Assistance  How was this patient Mobilized today?:  Resting in bed, no mobilization to promote rest , ambulated 0 ft                 With Whom? Self                 Level of Difficulty/Assistance: 1x Assist     Pt resting in bed at this time on room air. Pt denies shortness of breath. Pt without lower extremity edema.      Patient and/or Family's stated Goal of Care this Admission: increase activity tolerance and be more comfortable prior to discharge        :

## 2022-12-02 NOTE — PLAN OF CARE
Problem: Discharge Planning  Goal: Discharge to home or other facility with appropriate resources  12/2/2022 1411 by Modesta Reyes RN  Outcome: Progressing  Flowsheets (Taken 12/2/2022 4856)  Discharge to home or other facility with appropriate resources:   Identify barriers to discharge with patient and caregiver   Arrange for needed discharge resources and transportation as appropriate   Identify discharge learning needs (meds, wound care, etc)     Problem: Pain  Goal: Verbalizes/displays adequate comfort level or baseline comfort level  12/2/2022 0521 by Juan Glez RN  Outcome: Progressing     Problem: Safety - Adult  Goal: Free from fall injury  12/2/2022 1411 by Modesta Reyes RN  Outcome: Progressing     Problem: Chronic Conditions and Co-morbidities  Goal: Patient's chronic conditions and co-morbidity symptoms are monitored and maintained or improved  12/2/2022 1411 by Modesta Reyes RN  Outcome: Progressing  Flowsheets (Taken 12/2/2022 0904)  Care Plan - Patient's Chronic Conditions and Co-Morbidity Symptoms are Monitored and Maintained or Improved: Monitor and assess patient's chronic conditions and comorbid symptoms for stability, deterioration, or improvement     Problem: Skin/Tissue Integrity  Goal: Absence of new skin breakdown  Description: 1. Monitor for areas of redness and/or skin breakdown  2. Assess vascular access sites hourly  3. Every 4-6 hours minimum:  Change oxygen saturation probe site  4. Every 4-6 hours:  If on nasal continuous positive airway pressure, respiratory therapy assess nares and determine need for appliance change or resting period.   12/2/2022 1411 by Modesta Reyes RN  Outcome: Progressing     Problem: Metabolic/Fluid and Electrolytes - Adult  Goal: Electrolytes maintained within normal limits  Recent Flowsheet Documentation  Taken 12/2/2022 0905 by Modesta Reyes RN  Electrolytes maintained within normal limits: Monitor labs and assess patient for signs and symptoms of electrolyte imbalances

## 2022-12-03 LAB
ANION GAP SERPL CALCULATED.3IONS-SCNC: 5 MMOL/L (ref 3–16)
BLOOD CULTURE, ROUTINE: NORMAL
BUN BLDV-MCNC: 13 MG/DL (ref 7–20)
CALCIUM SERPL-MCNC: 8.8 MG/DL (ref 8.3–10.6)
CHLORIDE BLD-SCNC: 100 MMOL/L (ref 99–110)
CO2: 32 MMOL/L (ref 21–32)
CREAT SERPL-MCNC: 0.6 MG/DL (ref 0.8–1.3)
CULTURE, BLOOD 2: NORMAL
GFR SERPL CREATININE-BSD FRML MDRD: >60 ML/MIN/{1.73_M2}
GLUCOSE BLD-MCNC: 163 MG/DL (ref 70–99)
GLUCOSE BLD-MCNC: 163 MG/DL (ref 70–99)
GLUCOSE BLD-MCNC: 202 MG/DL (ref 70–99)
GLUCOSE BLD-MCNC: 249 MG/DL (ref 70–99)
GLUCOSE BLD-MCNC: 257 MG/DL (ref 70–99)
MAGNESIUM: 1.6 MG/DL (ref 1.8–2.4)
PERFORMED ON: ABNORMAL
POTASSIUM SERPL-SCNC: 3.8 MMOL/L (ref 3.5–5.1)
SODIUM BLD-SCNC: 137 MMOL/L (ref 136–145)

## 2022-12-03 PROCEDURE — 83735 ASSAY OF MAGNESIUM: CPT

## 2022-12-03 PROCEDURE — 6370000000 HC RX 637 (ALT 250 FOR IP): Performed by: INTERNAL MEDICINE

## 2022-12-03 PROCEDURE — 36415 COLL VENOUS BLD VENIPUNCTURE: CPT

## 2022-12-03 PROCEDURE — 1200000000 HC SEMI PRIVATE

## 2022-12-03 PROCEDURE — 6370000000 HC RX 637 (ALT 250 FOR IP): Performed by: NURSE PRACTITIONER

## 2022-12-03 PROCEDURE — 2580000003 HC RX 258: Performed by: NURSE PRACTITIONER

## 2022-12-03 PROCEDURE — 6370000000 HC RX 637 (ALT 250 FOR IP): Performed by: PSYCHIATRY & NEUROLOGY

## 2022-12-03 PROCEDURE — 6360000002 HC RX W HCPCS: Performed by: INTERNAL MEDICINE

## 2022-12-03 PROCEDURE — 80048 BASIC METABOLIC PNL TOTAL CA: CPT

## 2022-12-03 RX ADMIN — ATORVASTATIN CALCIUM 10 MG: 10 TABLET, FILM COATED ORAL at 20:35

## 2022-12-03 RX ADMIN — SODIUM CHLORIDE: 9 INJECTION, SOLUTION INTRAVENOUS at 20:34

## 2022-12-03 RX ADMIN — QUETIAPINE FUMARATE 12.5 MG: 25 TABLET ORAL at 10:20

## 2022-12-03 RX ADMIN — SODIUM CHLORIDE: 9 INJECTION, SOLUTION INTRAVENOUS at 06:20

## 2022-12-03 RX ADMIN — NITROFURANTOIN MONOHYDRATE/MACROCRYSTALS 100 MG: 75; 25 CAPSULE ORAL at 10:20

## 2022-12-03 RX ADMIN — SODIUM CHLORIDE, PRESERVATIVE FREE 10 ML: 5 INJECTION INTRAVENOUS at 10:23

## 2022-12-03 RX ADMIN — INSULIN LISPRO 2 UNITS: 100 INJECTION, SOLUTION INTRAVENOUS; SUBCUTANEOUS at 17:56

## 2022-12-03 RX ADMIN — QUETIAPINE FUMARATE 50 MG: 25 TABLET ORAL at 17:56

## 2022-12-03 RX ADMIN — INSULIN GLARGINE 5 UNITS: 100 INJECTION, SOLUTION SUBCUTANEOUS at 20:41

## 2022-12-03 RX ADMIN — NITROFURANTOIN MONOHYDRATE/MACROCRYSTALS 100 MG: 75; 25 CAPSULE ORAL at 20:41

## 2022-12-03 RX ADMIN — APIXABAN 5 MG: 5 TABLET, FILM COATED ORAL at 10:20

## 2022-12-03 RX ADMIN — SODIUM CHLORIDE: 9 INJECTION, SOLUTION INTRAVENOUS at 15:59

## 2022-12-03 RX ADMIN — DIVALPROEX SODIUM 125 MG: 125 CAPSULE, COATED PELLETS ORAL at 20:35

## 2022-12-03 RX ADMIN — APIXABAN 5 MG: 5 TABLET, FILM COATED ORAL at 20:34

## 2022-12-03 RX ADMIN — COLLAGENASE SANTYL: 250 OINTMENT TOPICAL at 10:22

## 2022-12-03 RX ADMIN — MAGNESIUM SULFATE HEPTAHYDRATE 2000 MG: 40 INJECTION, SOLUTION INTRAVENOUS at 15:59

## 2022-12-03 RX ADMIN — SERTRALINE HYDROCHLORIDE 25 MG: 50 TABLET ORAL at 10:20

## 2022-12-03 RX ADMIN — INSULIN LISPRO 2 UNITS: 100 INJECTION, SOLUTION INTRAVENOUS; SUBCUTANEOUS at 12:47

## 2022-12-03 RX ADMIN — DIVALPROEX SODIUM 125 MG: 125 CAPSULE, COATED PELLETS ORAL at 10:20

## 2022-12-03 ASSESSMENT — PAIN SCALES - PAIN ASSESSMENT IN ADVANCED DEMENTIA (PAINAD)
TOTALSCORE: 0
NEGVOCALIZATION: 0
BREATHING: 0
TOTALSCORE: 0
BODYLANGUAGE: 0
CONSOLABILITY: 0
TOTALSCORE: 0
CONSOLABILITY: 0
BREATHING: 0
TOTALSCORE: 0
CONSOLABILITY: 0
CONSOLABILITY: 0
NEGVOCALIZATION: 0
BODYLANGUAGE: 0
BREATHING: 0
NEGVOCALIZATION: 0
FACIALEXPRESSION: 0
NEGVOCALIZATION: 0
BREATHING: 0
BREATHING: 0
BODYLANGUAGE: 0
BREATHING: 0
FACIALEXPRESSION: 0
CONSOLABILITY: 0
BREATHING: 0
BODYLANGUAGE: 0
NEGVOCALIZATION: 0
CONSOLABILITY: 0
BODYLANGUAGE: 0
NEGVOCALIZATION: 0
TOTALSCORE: 0
FACIALEXPRESSION: 0
TOTALSCORE: 0
BODYLANGUAGE: 0
FACIALEXPRESSION: 0
NEGVOCALIZATION: 0
TOTALSCORE: 0
BREATHING: 0
BREATHING: 0
TOTALSCORE: 0
NEGVOCALIZATION: 0
BODYLANGUAGE: 0
FACIALEXPRESSION: 0
BODYLANGUAGE: 0
BREATHING: 0
BREATHING: 0
FACIALEXPRESSION: 0
FACIALEXPRESSION: 0
NEGVOCALIZATION: 0
NEGVOCALIZATION: 0
CONSOLABILITY: 0
CONSOLABILITY: 0
BREATHING: 0
BREATHING: 0
FACIALEXPRESSION: 0
BODYLANGUAGE: 0
BODYLANGUAGE: 0
CONSOLABILITY: 0
FACIALEXPRESSION: 0
BODYLANGUAGE: 0
NEGVOCALIZATION: 0
CONSOLABILITY: 0
FACIALEXPRESSION: 0
BODYLANGUAGE: 0
BODYLANGUAGE: 0
FACIALEXPRESSION: 0
CONSOLABILITY: 0
NEGVOCALIZATION: 0
TOTALSCORE: 0
CONSOLABILITY: 0
CONSOLABILITY: 0
TOTALSCORE: 0
FACIALEXPRESSION: 0
NEGVOCALIZATION: 0
TOTALSCORE: 0
FACIALEXPRESSION: 0
TOTALSCORE: 0
TOTALSCORE: 0

## 2022-12-03 ASSESSMENT — PAIN SCALES - WONG BAKER

## 2022-12-03 NOTE — PLAN OF CARE
Patient's EF (Ejection Fraction) is less than 40%  Equals 40%  Heart Failure Medications:  Diuretics[de-identified] Furosemide, Torsemide, Spironolactone, Metalozone, Other, and None    (One of the following REQUIRED for EF </= 40%/SYSTOLIC FAILURE but MAY be used in EF% >40%/DIASTOLIC FAILURE)        ACE[de-identified] None        ARB[de-identified] None         ARNI[de-identified] None    (Beta Blockers)  NON- Evidenced Based Beta Blocker (for EF% >40%/DIASTOLIC FAILURE): None    Evidenced Based Beta Blocker::(REQUIRED for EF% <40%/SYSTOLIC FAILURE) None  . .................................................................................................................................................. Patient's weights and intake/output reviewed: Yes    Patient's Last Weight: 80.2 kg obtained by bed scale. Difference of 3.1kgless than last documented weight. Patient in bed and not ready to get down for standing scale weight    Intake/Output Summary (Last 24 hours) at 12/3/2022 1754  Last data filed at 12/3/2022 3099  Gross per 24 hour   Intake 1140 ml   Output 2900 ml   Net -1760 ml       Education Booklet Provided: yes    Comorbidities Reviewed Yes    Patient has a past medical history of Acute cystitis without hematuria, Arthritis, Cerebellar infarct ([address] Cerebral artery occlusion with cerebral infarction ([address] Diabetes mellitus ([address] Diabetic nephropathy with proteinuria ([address] Diabetic peripheral neuropathy ([address] Diabetic ulcer of left great toe ([address] Diabetic ulcer of left midfoot associated with type 2 diabetes mellitus ([address] Disease of blood and blood forming organ, Dizziness, Hyperlipidemia, Hypertension, Hypomagnesemia, Moderate non-proliferative diabetic retinopathy ([address] Movement disorder, and Sepsis due to urinary tract infection ([address]      >>For CHF and Comorbidity documentation on Education Time and Topics, please see Education Tab    Progressive Mobility Assessment:  What is this patient's Current Level of Mobility?:  was in bed all night  How was this patient Mobilized today?:  none, Edge of Bed, Up to Chair, Bedside Commode,  Up to Toilet/Shower, Up in Room, Up in [place], Unable to Mobilize, and Patient Refuses to Mobilize, ambulated 0 ft                 With Whom?  none, Nurse, PCA, PT, OT, and Self                 Level of Difficulty/Assistance:  none      Pt resting in bed at this time on room air. Pt denies shortness of breath. Pt without lower extremity edema.      Patient and/or Family's stated Goal of Care this Admission: reduce shortness of breath, increase activity tolerance, better understand heart failure and disease management, be more comfortable, and reduce lower extremity edema prior to discharge        :

## 2022-12-03 NOTE — PROGRESS NOTES
Hospitalist Progress Note      PCP: Nanette Prakash    Date of Admission: 11/28/2022    Chief Complaint: Confusion, urinary retention    Hospital Course: Presented with confusion. Found to have acute kidney injury and urinary retention. Improved after Valenzuela catheter insertion by urology. Also noted to have elevated troponin and elevated procalcitonin. On empiric antibiotics. Subjective:     Patient alert and oriented x2. He denies any complaints. No nausea vomiting. No fevers or chills. Patient denies dysuria. Patient does have Valenzuela catheter in place.       Medications:  Reviewed    Infusion Medications    sodium chloride 25 mL/hr at 12/02/22 1056    sodium chloride 75 mL/hr at 12/02/22 1538    dextrose       Scheduled Medications    magnesium oxide  400 mg Oral BID    collagenase   Topical Daily    QUEtiapine  12.5 mg Oral Daily    QUEtiapine  50 mg Oral Dinner    atorvastatin  10 mg Oral Nightly    insulin glargine  5 Units SubCUTAneous Nightly    sodium chloride flush  5-40 mL IntraVENous 2 times per day    cefepime  2,000 mg IntraVENous Q12H    insulin lispro  0-8 Units SubCUTAneous TID WC    insulin lispro  0-4 Units SubCUTAneous Nightly    vancomycin  1,250 mg IntraVENous Q24H    apixaban  5 mg Oral BID    sertraline  25 mg Oral Daily    divalproex  125 mg Oral 2 times per day     PRN Meds: magnesium sulfate, sodium chloride flush, sodium chloride, ondansetron **OR** ondansetron, acetaminophen **OR** acetaminophen, glucose, dextrose bolus **OR** dextrose bolus, glucagon (rDNA), dextrose, ziprasidone      Intake/Output Summary (Last 24 hours) at 12/2/2022 1903  Last data filed at 12/2/2022 1837  Gross per 24 hour   Intake 1330 ml   Output 2850 ml   Net -1520 ml         Physical Exam Performed:    /67   Pulse 66   Temp 97.7 °F (36.5 °C) (Axillary)   Resp 16   Ht 6' 4\" (1.93 m)   Wt 183 lb 10.3 oz (83.3 kg)   SpO2 99%   BMI 22.35 kg/m²     General appearance: No apparent distress, appears stated age and cooperative. HEENT: Pupils equal, round, and reactive to light. Conjunctivae/corneas clear. Neck: Supple, with full range of motion. No jugular venous distention. Trachea midline. Respiratory:  Normal respiratory effort. Clear to auscultation, bilaterally without Rales/Wheezes/Rhonchi. Cardiovascular: Regular rate and rhythm with normal S1/S2 without murmurs, rubs or gallops. Abdomen: Soft, non-tender, non-distended with normal bowel sounds. Musculoskeletal: No clubbing, cyanosis or edema bilaterally. Full range of motion without deformity. Skin: unstageable 4 cm sacral decubitus that was present on arrival.  : Valenzuela catheter in place. Neurologic:  Neurovascularly intact without any focal sensory/motor deficits. Cranial nerves: II-XII intact, grossly non-focal.  Psychiatric: Alert and pleasantly confused with hallucinations \"there was a big party here\", \"people got a lot of money\". Capillary Refill: Brisk, 3 seconds, normal   Peripheral Pulses: +2 palpable, equal bilaterally       Labs:   Recent Labs     11/30/22  0748   WBC 9.5   HGB 11.6*   HCT 34.1*          Recent Labs     11/30/22  0748 12/01/22  0650 12/02/22  0718    137 137   K 3.8 4.1 4.0    101 100   CO2 27 21 30   BUN 24* 18 14   CREATININE 0.8 0.8 0.7*   CALCIUM 8.9 8.8 8.9       No results for input(s): AST, ALT, BILIDIR, BILITOT, ALKPHOS in the last 72 hours. No results for input(s): INR in the last 72 hours. Recent Labs     11/29/22  2220   TROPONINI 0.02*         Urinalysis:      Lab Results   Component Value Date/Time    NITRU Negative 11/29/2022 10:50 AM    WBCUA  11/29/2022 10:50 AM    BACTERIA 4+ 11/29/2022 10:50 AM    RBCUA 5-10 11/29/2022 10:50 AM    BLOODU SMALL 11/29/2022 10:50 AM    SPECGRAV 1.020 11/29/2022 10:50 AM    GLUCOSEU Negative 11/29/2022 10:50 AM       Radiology:  CT CERVICAL SPINE WO CONTRAST   Final Result   No acute abnormality of the cervical spine. CT HEAD WO CONTRAST   Final Result   No acute intracranial abnormality. Moderate diffuse cerebral atrophy. Mild cerebral white matter chronic microvascular ischemic disease. Bilateral maxillary chronic sinusitis. XR CHEST PORTABLE   Final Result   No acute cardiopulmonary abnormality. Lung parenchymal changes suggestive of chronic interstitial lung disease. IP CONSULT TO UROLOGY  IP CONSULT TO PHARMACY  IP CONSULT TO UROLOGY  IP CONSULT TO PSYCHIATRY    Assessment/Plan:    Active Hospital Problems    Diagnosis     MIYA (acute kidney injury) ([facility] [N17.9]      Priority: Medium    Diabetic peripheral neuropathy ([facility] [E11.42]      Priority: Medium    Acute combined systolic and diastolic heart failure (HCC) [I50.41]     Alzheimer's disease ([facility] [G30.9, F02.80]     Essential hypertension [I10]     Mixed hyperlipidemia [E78.2]     BPH with obstruction/lower urinary tract symptoms [N40.1, N13.8]      PLAN    1. Enteroccocus UTI. Patient with elevated procalcitonin. Discontinue cefepime and vancomycin. Will start on nitrofurantoin. 2.  Unstageable sacral decubitus ulcer. Wound care team is following. We will continue frequent turns. 3.  Metabolic encephalopathy with underlying dementia. Patient lives in the memory care unit. Patient may need SNF placement prior to discharge back to memory care unit. We will correct underlying disorders. Continue supportive care. 4.  MIYA secondary to postobstructive uropathy? .  Patient will continue Valenzuela catheter for 1 week. Urology is following. Creatinine normalized. 5.  Paroxysmal atrial fibrillation. Patient on Eliquis. Will monitor rate closely. Continue telemetry. 6.  Type 2 diabetes. Continue insulin sliding scale. Continue basal insulin. 7.  Electrolyte abnormalities. We will correct and continue to monitor. Improved. DVT Prophylaxis: Eliquis  Diet: ADULT DIET;  Regular  ADULT ORAL NUTRITION SUPPLEMENT; Breakfast, Dinner; Diabetic Oral Supplement  Code Status: DNR-CCA  PT/OT Eval Status: Ordered    Dispo - SNF when stable.     Appropriate for A1 Discharge Unit: Bernadette Cohen MD

## 2022-12-03 NOTE — PROGRESS NOTES
Hospitalist Progress Note      PCP: Griselda Denis    Date of Admission: 11/28/2022    Chief Complaint: Confusion, urinary retention    Hospital Course: Presented with confusion. Found to have acute kidney injury and urinary retention. Improved after Valenzuela catheter insertion by urology. Also noted to have elevated troponin and elevated procalcitonin. On empiric antibiotics. Subjective:     Patient without new complaints. Sitter is at bedside. Attempted trial without sitter but patient pulled out his Valenzuela x2. Sitter reordered. Patient denies any pain. No nausea or vomiting.       Medications:  Reviewed    Infusion Medications    sodium chloride 25 mL/hr at 12/02/22 1056    sodium chloride 75 mL/hr at 12/03/22 0620    dextrose       Scheduled Medications    nitrofurantoin (macrocrystal-monohydrate)  100 mg Oral 2 times per day    collagenase   Topical Daily    QUEtiapine  12.5 mg Oral Daily    QUEtiapine  50 mg Oral Dinner    atorvastatin  10 mg Oral Nightly    insulin glargine  5 Units SubCUTAneous Nightly    sodium chloride flush  5-40 mL IntraVENous 2 times per day    insulin lispro  0-8 Units SubCUTAneous TID WC    insulin lispro  0-4 Units SubCUTAneous Nightly    apixaban  5 mg Oral BID    sertraline  25 mg Oral Daily    divalproex  125 mg Oral 2 times per day     PRN Meds: magnesium sulfate, sodium chloride flush, sodium chloride, ondansetron **OR** ondansetron, acetaminophen **OR** acetaminophen, glucose, dextrose bolus **OR** dextrose bolus, glucagon (rDNA), dextrose, ziprasidone      Intake/Output Summary (Last 24 hours) at 12/3/2022 1232  Last data filed at 12/3/2022 1029  Gross per 24 hour   Intake 1140 ml   Output 2500 ml   Net -1360 ml         Physical Exam Performed:    BP (!) 148/69   Pulse 68   Temp 98.5 °F (36.9 °C) (Oral)   Resp 16   Ht 6' 4\" (1.93 m)   Wt 176 lb 12.9 oz (80.2 kg)   SpO2 97%   BMI 21.52 kg/m²     General appearance: No apparent distress, appears stated age and cooperative. HEENT: Pupils equal, round, and reactive to light. Conjunctivae/corneas clear. Neck: Supple, with full range of motion. No jugular venous distention. Trachea midline. Respiratory:  Normal respiratory effort. Clear to auscultation, bilaterally without Rales/Wheezes/Rhonchi. Cardiovascular: Regular rate and rhythm with normal S1/S2 without murmurs, rubs or gallops. Abdomen: Soft, non-tender, non-distended with normal bowel sounds. Musculoskeletal: No clubbing, cyanosis or edema bilaterally. Full range of motion without deformity. Skin: unstageable 4 cm sacral decubitus that was present on arrival.  : Valenzuela catheter in place. Neurologic:  Neurovascularly intact without any focal sensory/motor deficits. Cranial nerves: II-XII intact, grossly non-focal.  Psychiatric: Patient pleasantly confused. Alert to person. Capillary Refill: Brisk, 3 seconds, normal   Peripheral Pulses: +2 palpable, equal bilaterally       Labs:   No results for input(s): WBC, HGB, HCT, PLT in the last 72 hours. Recent Labs     12/01/22  0650 12/02/22  0718 12/03/22  0636    137 137   K 4.1 4.0 3.8    100 100   CO2 21 30 32   BUN 18 14 13   CREATININE 0.8 0.7* 0.6*   CALCIUM 8.8 8.9 8.8       No results for input(s): AST, ALT, BILIDIR, BILITOT, ALKPHOS in the last 72 hours. No results for input(s): INR in the last 72 hours. No results for input(s): Donzella Rands in the last 72 hours. Urinalysis:      Lab Results   Component Value Date/Time    NITRU Negative 11/29/2022 10:50 AM    WBCUA  11/29/2022 10:50 AM    BACTERIA 4+ 11/29/2022 10:50 AM    RBCUA 5-10 11/29/2022 10:50 AM    BLOODU SMALL 11/29/2022 10:50 AM    SPECGRAV 1.020 11/29/2022 10:50 AM    GLUCOSEU Negative 11/29/2022 10:50 AM       Radiology:  CT CERVICAL SPINE WO CONTRAST   Final Result   No acute abnormality of the cervical spine. CT HEAD WO CONTRAST   Final Result   No acute intracranial abnormality. Moderate diffuse cerebral atrophy. Mild cerebral white matter chronic microvascular ischemic disease. Bilateral maxillary chronic sinusitis. XR CHEST PORTABLE   Final Result   No acute cardiopulmonary abnormality. Lung parenchymal changes suggestive of chronic interstitial lung disease. IP CONSULT TO UROLOGY  IP CONSULT TO PHARMACY  IP CONSULT TO UROLOGY  IP CONSULT TO PSYCHIATRY    Assessment/Plan:    Active Hospital Problems    Diagnosis     [facility] [N17.9]      Priority: Medium    Diabetic peripheral neuropathy ([facility] [E11.42]      Priority: Medium    Acute combined systolic and diastolic heart failure (HCC) [I50.41]     Alzheimer's disease ([facility] [G30.9, F02.80]     Essential hypertension [I10]     Mixed hyperlipidemia [E78.2]     BPH with obstruction/lower urinary tract symptoms [N40.1, N13.8]      PLAN    1. Enteroccocus UTI. Patient with elevated procalcitonin. Discontinue cefepime and vancomycin. Started nitrofurantoin. 2.  Unstageable sacral decubitus ulcer. Wound care team is following. We will continue frequent turns. 3.  Metabolic encephalopathy with underlying dementia. Patient lives in the memory care unit. Patient may need SNF placement. sitter still required. 4.  MIYA secondary to postobstructive uropathy? .  Patient will continue Valenzuela catheter for 1 week. Urology is following. Creatinine normalized. 5.  Paroxysmal atrial fibrillation. Patient on Eliquis. Will monitor rate closely. Continue telemetry. 6.  Type 2 diabetes. Continue insulin sliding scale. Continue basal insulin. 7.  Electrolyte abnormalities. We will correct and continue to monitor. Improved. DVT Prophylaxis: Eliquis  Diet: ADULT DIET; Regular  ADULT ORAL NUTRITION SUPPLEMENT; Breakfast, Dinner; Diabetic Oral Supplement  Code Status: DNR-CCA  PT/OT Eval Status: Ordered    Dispo - SNF when stable.     Appropriate for A1 Discharge Unit: [facility] Zoe Edgar MD

## 2022-12-03 NOTE — PLAN OF CARE
Problem: Discharge Planning  Goal: Discharge to home or other facility with appropriate resources  12/3/2022 1204 by Prakash Mckay RN  Outcome: Progressing  Flowsheets (Taken 12/3/2022 1024)  Discharge to home or other facility with appropriate resources:   Identify barriers to discharge with patient and caregiver   Arrange for needed discharge resources and transportation as appropriate   Identify discharge learning needs (meds, wound care, etc)     Problem: Safety - Adult  Goal: Free from fall injury  12/3/2022 1204 by Prakash Mckay RN  Outcome: Progressing     Problem: Chronic Conditions and Co-morbidities  Goal: Patient's chronic conditions and co-morbidity symptoms are monitored and maintained or improved  12/3/2022 1204 by Prakash Mckay RN  Outcome: Progressing  Flowsheets (Taken 12/3/2022 1024)  Care Plan - Patient's Chronic Conditions and Co-Morbidity Symptoms are Monitored and Maintained or Improved: Monitor and assess patient's chronic conditions and comorbid symptoms for stability, deterioration, or improvement     Problem: Skin/Tissue Integrity  Goal: Absence of new skin breakdown  Description: 1. Monitor for areas of redness and/or skin breakdown  2. Assess vascular access sites hourly  3. Every 4-6 hours minimum:  Change oxygen saturation probe site  4. Every 4-6 hours:  If on nasal continuous positive airway pressure, respiratory therapy assess nares and determine need for appliance change or resting period.   12/3/2022 1204 by Prakash Mckay RN  Outcome: Progressing

## 2022-12-03 NOTE — PLAN OF CARE
Problem: Discharge Planning  Goal: Discharge to home or other facility with appropriate resources  12/3/2022 0334 by Martita Pastrana RN  Outcome: Progressing  12/2/2022 1411 by Luba Alva RN  Outcome: Progressing  Flowsheets (Taken 12/2/2022 1696)  Discharge to home or other facility with appropriate resources:   Identify barriers to discharge with patient and caregiver   Arrange for needed discharge resources and transportation as appropriate   Identify discharge learning needs (meds, wound care, etc)     Problem: Pain  Goal: Verbalizes/displays adequate comfort level or baseline comfort level  Outcome: Progressing     Problem: Safety - Adult  Goal: Free from fall injury  12/3/2022 0334 by Martita Pastrana RN  Outcome: Progressing  12/2/2022 1411 by Luba Alva RN  Outcome: Progressing   Patient stayed in bed all night without trying to get off bed, sitter around. Problem: Chronic Conditions and Co-morbidities  Goal: Patient's chronic conditions and co-morbidity symptoms are monitored and maintained or improved  12/3/2022 0334 by Martita Pastrana RN  Outcome: Progressing  12/2/2022 1411 by Luba Alva RN  Outcome: Progressing  Flowsheets (Taken 12/2/2022 0904)  Care Plan - Patient's Chronic Conditions and Co-Morbidity Symptoms are Monitored and Maintained or Improved: Monitor and assess patient's chronic conditions and comorbid symptoms for stability, deterioration, or improvement     Problem: Skin/Tissue Integrity  Goal: Absence of new skin breakdown  Description: 1. Monitor for areas of redness and/or skin breakdown  2. Assess vascular access sites hourly  3. Every 4-6 hours minimum:  Change oxygen saturation probe site  4. Every 4-6 hours:  If on nasal continuous positive airway pressure, respiratory therapy assess nares and determine need for appliance change or resting period.   12/3/2022 0334 by Martita Pastrana RN  Outcome: Progressing  12/2/2022 1411 by Luba Alva RN  Outcome: Progressing     Problem: Cardiovascular - Adult  Goal: Maintains optimal cardiac output and hemodynamic stability  Outcome: Progressing   Maintenance fluid administration ongoing, hemodynamic parameters stable  Problem: Cardiovascular - Adult  Goal: Absence of cardiac dysrhythmias or at baseline  Outcome: Progressing     Problem: Metabolic/Fluid and Electrolytes - Adult  Goal: Electrolytes maintained within normal limits  Outcome: Progressing     Problem: Metabolic/Fluid and Electrolytes - Adult  Goal: Hemodynamic stability and optimal renal function maintained  Outcome: Progressing     Problem: Metabolic/Fluid and Electrolytes - Adult  Goal: Glucose maintained within prescribed range  Outcome: Progressing   Glucose monitoring in progress and insulin coverage provided per order  Problem: Nutrition Deficit:  Goal: Optimize nutritional status  12/3/2022 0334 by Lui Mckeon RN  Outcome: Progressing  12/2/2022 1411 by Beau Liu RN  Outcome: Progressing     Problem: Genitourinary - Adult  Goal: Urinary catheter remains patent  Outcome: Progressing   Urethral catheter remains patent with no bladder retention noted  Problem: Genitourinary - Adult  Goal: Absence of urinary retention  Outcome: Progressing   Valenzuela's remains in place and draining well  Problem: Skin/Tissue Integrity - Adult  Goal: Skin integrity remains intact  Outcome: Progressing  Flowsheets (Taken 12/2/2022 1414 by Beau Liu RN)  Skin Integrity Remains Intact: Monitor for areas of redness and/or skin breakdown     Problem: Neurosensory - Adult  Goal: Achieves stable or improved neurological status  Outcome: Progressing

## 2022-12-04 LAB
ANION GAP SERPL CALCULATED.3IONS-SCNC: 6 MMOL/L (ref 3–16)
BUN BLDV-MCNC: 13 MG/DL (ref 7–20)
CALCIUM SERPL-MCNC: 8.7 MG/DL (ref 8.3–10.6)
CHLORIDE BLD-SCNC: 100 MMOL/L (ref 99–110)
CO2: 30 MMOL/L (ref 21–32)
CREAT SERPL-MCNC: 0.6 MG/DL (ref 0.8–1.3)
GFR SERPL CREATININE-BSD FRML MDRD: >60 ML/MIN/{1.73_M2}
GLUCOSE BLD-MCNC: 152 MG/DL (ref 70–99)
GLUCOSE BLD-MCNC: 180 MG/DL (ref 70–99)
GLUCOSE BLD-MCNC: 189 MG/DL (ref 70–99)
GLUCOSE BLD-MCNC: 214 MG/DL (ref 70–99)
GLUCOSE BLD-MCNC: 261 MG/DL (ref 70–99)
MAGNESIUM: 1.7 MG/DL (ref 1.8–2.4)
PERFORMED ON: ABNORMAL
POTASSIUM SERPL-SCNC: 3.7 MMOL/L (ref 3.5–5.1)
SODIUM BLD-SCNC: 136 MMOL/L (ref 136–145)

## 2022-12-04 PROCEDURE — 80048 BASIC METABOLIC PNL TOTAL CA: CPT

## 2022-12-04 PROCEDURE — 6370000000 HC RX 637 (ALT 250 FOR IP): Performed by: INTERNAL MEDICINE

## 2022-12-04 PROCEDURE — 2580000003 HC RX 258: Performed by: NURSE PRACTITIONER

## 2022-12-04 PROCEDURE — 6370000000 HC RX 637 (ALT 250 FOR IP): Performed by: PSYCHIATRY & NEUROLOGY

## 2022-12-04 PROCEDURE — 97110 THERAPEUTIC EXERCISES: CPT

## 2022-12-04 PROCEDURE — 6370000000 HC RX 637 (ALT 250 FOR IP): Performed by: NURSE PRACTITIONER

## 2022-12-04 PROCEDURE — 1200000000 HC SEMI PRIVATE

## 2022-12-04 PROCEDURE — 97530 THERAPEUTIC ACTIVITIES: CPT

## 2022-12-04 PROCEDURE — 36415 COLL VENOUS BLD VENIPUNCTURE: CPT

## 2022-12-04 PROCEDURE — 83735 ASSAY OF MAGNESIUM: CPT

## 2022-12-04 RX ADMIN — INSULIN GLARGINE 5 UNITS: 100 INJECTION, SOLUTION SUBCUTANEOUS at 20:01

## 2022-12-04 RX ADMIN — SODIUM CHLORIDE: 9 INJECTION, SOLUTION INTRAVENOUS at 09:38

## 2022-12-04 RX ADMIN — QUETIAPINE FUMARATE 12.5 MG: 25 TABLET ORAL at 09:34

## 2022-12-04 RX ADMIN — NITROFURANTOIN MONOHYDRATE/MACROCRYSTALS 100 MG: 75; 25 CAPSULE ORAL at 20:05

## 2022-12-04 RX ADMIN — APIXABAN 5 MG: 5 TABLET, FILM COATED ORAL at 09:34

## 2022-12-04 RX ADMIN — QUETIAPINE FUMARATE 50 MG: 25 TABLET ORAL at 17:23

## 2022-12-04 RX ADMIN — DIVALPROEX SODIUM 125 MG: 125 CAPSULE, COATED PELLETS ORAL at 09:34

## 2022-12-04 RX ADMIN — ATORVASTATIN CALCIUM 10 MG: 10 TABLET, FILM COATED ORAL at 20:00

## 2022-12-04 RX ADMIN — SERTRALINE HYDROCHLORIDE 25 MG: 50 TABLET ORAL at 09:34

## 2022-12-04 RX ADMIN — SODIUM CHLORIDE, PRESERVATIVE FREE 10 ML: 5 INJECTION INTRAVENOUS at 09:34

## 2022-12-04 RX ADMIN — DIVALPROEX SODIUM 125 MG: 125 CAPSULE, COATED PELLETS ORAL at 20:00

## 2022-12-04 RX ADMIN — INSULIN LISPRO 2 UNITS: 100 INJECTION, SOLUTION INTRAVENOUS; SUBCUTANEOUS at 13:11

## 2022-12-04 RX ADMIN — APIXABAN 5 MG: 5 TABLET, FILM COATED ORAL at 20:00

## 2022-12-04 RX ADMIN — SODIUM CHLORIDE, PRESERVATIVE FREE 10 ML: 5 INJECTION INTRAVENOUS at 20:00

## 2022-12-04 RX ADMIN — NITROFURANTOIN MONOHYDRATE/MACROCRYSTALS 100 MG: 75; 25 CAPSULE ORAL at 09:34

## 2022-12-04 RX ADMIN — COLLAGENASE SANTYL: 250 OINTMENT TOPICAL at 09:35

## 2022-12-04 ASSESSMENT — PAIN SCALES - WONG BAKER
WONGBAKER_NUMERICALRESPONSE: 0
WONGBAKER_NUMERICALRESPONSE: 2
WONGBAKER_NUMERICALRESPONSE: 0

## 2022-12-04 NOTE — PROGRESS NOTES
Physical Therapy  Facility/Department: [facility] B3 - MED SURG  Daily Treatment Note  NAME: Diandra Mike  : 1950  MRN: [id number]    Date of Service: 2022    Discharge Recommendations:  Subacute/Skilled Nursing Facility   PT Equipment Recommendations  Equipment Needed: No  Other: defer to next level of care    Patient Diagnosis(es): The primary encounter diagnosis was Acute encephalopathy. Diagnoses of MIYA (acute kidney injury) ([address] Elevated procalcitonin, and Acute urinary retention were also pertinent to this visit. Assessment   Assessment: pt found in bed working with OT, pt seen as co treat with OT for bed mobility and transfers secondary to pt decreased cognition and need for 2 skilled hands to maximize pt safety. pt required MIN A X2 for bed mobility and MIN A X2 for trasnfers with RW, pt able to take steps toward [facility] in standing to reposition, moving about ~2 feet total, but reports dizziness, pt sat at [facility] and BP measured and found to be orthostatic, at which point pt returned to supine, nursing notified. pt very confused on today's date and cognition presents as a barrier to pt's progress as does orthostatic BP.  pt demosntrates decreased cognition, strength, endurance, mobility, and functional capacity and would continue to benefit from skilled PT to address the above stated deficits. recommend Dc to SNF. Activity Tolerance: Patient tolerated evaluation without incident;Patient tolerated treatment well;Patient limited by pain; Patient limited by endurance  Equipment Needed: No  Other: defer to next level of care     Plan    Physcial Therapy Plan  General Plan: 3-5 times per week  Current Treatment Recommendations: Strengthening;Home exercise program;Balance training;Gait training; Safety education & training; Therapeutic activities; Functional mobility training;Patient/Caregiver education & training;Neuromuscular re-education;Transfer training;Equipment evaluation, education, & procurement;Positioning; Endurance training     Restrictions  Restrictions/Precautions  Restrictions/Precautions: Fall Risk, Up as Tolerated, General Precautions  Position Activity Restriction  Other position/activity restrictions: Sitter at bedside, risk of elopementkrishan, Stage IV wound on buttocks     Subjective    Subjective  Subjective: pt has difficulty expressing himself coherently secondary to cognition, agreeable to PT/OT treatment, stating, \"It feels good to move sometimes\". Pain: denies pain, but pt unable to formally rate pain 0/10 secondary to cognition, no apparant distress or grimacing noted during treatment. Orientation  Overall Orientation Status: Impaired  Orientation Level: Disoriented to place; Disoriented to time;Disoriented to situation;Disoriented to person  Cognition  Overall Cognitive Status: Exceptions  Arousal/Alertness: Delayed responses to stimuli  Following Commands: Follows one step commands with repetition  Attention Span: Difficulty attending to directions  Memory: Decreased recall of biographical Information;Decreased short term memory;Decreased recall of recent events;Decreased long term memory;Decreased recall of precautions  Safety Judgement: Decreased awareness of need for safety;Decreased awareness of need for assistance  Problem Solving: Assistance required to identify errors made;Assistance required to correct errors made;Assistance required to implement solutions;Assistance required to generate solutions;Decreased awareness of errors  Insights: Not aware of deficits  Initiation: Requires cues for all  Sequencing: Requires cues for all  Cognition Comment: pt pleasantly confused, agreeable to treatment and cooperative, requires VCs and manual cues to stay on task with exercises and for trasnfers. Objective   Vitals  Comment: HR 73 BPM, SPO2 98% on room air, /68 in supine, 109/66 after sit<>stand transfer and stepping ~2' toward [facility], pt stating he feels dizzy.   Bed Mobility Training  Bed Mobility Training: Yes  Interventions: Manual cues; Tactile cues; Verbal cues; Safety awareness training  Sit to Supine: Minimum assistance;Assist X2;Additional time (with HOB elevated and use of BR.)  Balance  Sitting: Impaired  Sitting - Static: Good (unsupported)  Sitting - Dynamic: Fair (occasional)  Standing: Impaired  Standing - Static: Fair;Constant support  Standing - Dynamic: Poor;Constant support  Transfer Training  Transfer Training: Yes  Overall Level of Assistance: Additional time;Assist X2;Minimum assistance (with RW.)  Interventions: Manual cues; Verbal cues  Sit to Stand: Minimum assistance;Assist X2;Additional time  Stand to Sit: Minimum assistance;Assist X2;Additional time  Gait Training  Gait Training: No (no gait training performed on today's date secondary to pt's orthostatic blood pressure drop with positional change.)     [facility] 6 Clicks Inpatient Mobility:  AM-PAC Mobility Inpatient   How much difficulty turning over in bed?: A Little  How much difficulty sitting down on / standing up from a chair with arms?: A Lot  How much difficulty moving from lying on back to sitting on side of bed?: A Lot  How much help from another person moving to and from a bed to a chair?: A Lot  How much help from another person needed to walk in hospital room?: A Lot  How much help from another person for climbing 3-5 steps with a railing?: Total  AM-PAC Inpatient Mobility Raw Score : 12  AM-PAC Inpatient T-Scale Score : 35.33  Mobility Inpatient CMS 0-100% Score: 68.66  Mobility Inpatient CMS G-Code Modifier : CL     PT Exercises  Exercise Treatment: AP, HS, QS, GS, all performed in supine 1X10 each, pt required VCs and tactile cues to perform secondary to cognition, but states \"It feels better to move a little\" while performing exercises, in no apparant distress. Safety Devices  Type of Devices: Call light within reach;Nurse notified; Bed alarm in place; Left in bed;Patient at risk for falls  Restraints  Restraints Initially in Place: No       Goals  Short Term Goals  Time Frame for Short Term Goals: 1 week 12/08/22 (unless otherwise specified)  Short Term Goal 1: 12/05/22: Pt will complete bed mobility with SBA  Short Term Goal 2: Pt will complete sit to/from stand with LRAD with CGA  Short Term Goal 3: Pt will ambulate 48' with LRAD with CGA without LOB  Patient Goals   Patient Goals : Pt is unable to state d/t cognition    Education  Patient Education  Education Given To: Patient  Education Provided: Role of Therapy;Plan of Care;Orientation;Precautions;Transfer Training  Education Method: Demonstration;Verbal  Barriers to Learning: Cognition; Hearing  Education Outcome: Unable to demonstrate understanding; Unable to verbalize;Continued education needed    Therapy Time   Individual Concurrent Group Co-treatment   Time In 1215         Time Out 1245         Minutes 30         Timed Code Treatment Minutes: 30 Minutes       Inocencia Burt PT, DPT  If pt is unable to be seen after this session, please let this note serve as discharge summary. Please see case management note for discharge disposition. Thank you.

## 2022-12-04 NOTE — PLAN OF CARE
Problem: Discharge Planning  Goal: Discharge to home or other facility with appropriate resources  Outcome: Progressing     Problem: Pain  Goal: Verbalizes/displays adequate comfort level or baseline comfort level  Outcome: Progressing     Problem: Safety - Adult  Goal: Free from fall injury  Outcome: Progressing     Problem: Chronic Conditions and Co-morbidities  Goal: Patient's chronic conditions and co-morbidity symptoms are monitored and maintained or improved  Outcome: Progressing     Problem: Skin/Tissue Integrity  Goal: Absence of new skin breakdown  Description: 1. Monitor for areas of redness and/or skin breakdown  2. Assess vascular access sites hourly  3. Every 4-6 hours minimum:  Change oxygen saturation probe site  4. Every 4-6 hours:  If on nasal continuous positive airway pressure, respiratory therapy assess nares and determine need for appliance change or resting period.   Outcome: Progressing     Problem: Cardiovascular - Adult  Goal: Maintains optimal cardiac output and hemodynamic stability  Outcome: Progressing  Goal: Absence of cardiac dysrhythmias or at baseline  Outcome: Progressing     Problem: Metabolic/Fluid and Electrolytes - Adult  Goal: Electrolytes maintained within normal limits  Outcome: Progressing  Goal: Hemodynamic stability and optimal renal function maintained  Outcome: Progressing  Goal: Glucose maintained within prescribed range  Outcome: Progressing     Problem: Hematologic - Adult  Goal: Maintains hematologic stability  Outcome: Progressing     Problem: Nutrition Deficit:  Goal: Optimize nutritional status  Outcome: Progressing     Problem: Neurosensory - Adult  Goal: Achieves stable or improved neurological status  Outcome: Progressing     Problem: Skin/Tissue Integrity - Adult  Goal: Skin integrity remains intact  Outcome: Progressing  Flowsheets (Taken 12/4/2022 0805)  Skin Integrity Remains Intact: Monitor for areas of redness and/or skin breakdown     Problem: Genitourinary - Adult  Goal: Absence of urinary retention  12/4/2022 1058 by Elias Casarez RN  Outcome: Progressing  Flowsheets (Taken 12/4/2022 0805)  Absence of urinary retention: Discuss catheterization for long term situations as appropriate  12/3/2022 2220 by Jania Moore RN  Outcome: Progressing  Flowsheets  Taken 12/3/2022 2220 by Jania Moore RN  Absence of urinary retention: Discuss catheterization for long term situations as appropriate  Taken 12/3/2022 1024 by Mary Gomez RN  Absence of urinary retention: Assess patients ability to void and empty bladder  Goal: Urinary catheter remains patent  12/4/2022 1058 by Elias Casarez RN  Outcome: Progressing  Flowsheets (Taken 12/4/2022 0805)  Urinary catheter remains patent: Assess patency of urinary catheter  12/3/2022 2220 by Jania Moore RN  Outcome: Progressing  Flowsheets  Taken 12/3/2022 2220 by Jania Moore RN  Urinary catheter remains patent: Assess patency of urinary catheter  Taken 12/3/2022 1024 by Mary Gomez RN  Urinary catheter remains patent: Assess patency of urinary catheter     Problem: Confusion  Goal: Confusion, delirium, dementia, or psychosis is improved or at baseline  Description: INTERVENTIONS:  1. Assess for possible contributors to thought disturbance, including medications, impaired vision or hearing, underlying metabolic abnormalities, dehydration, psychiatric diagnoses, and notify attending LIP  2. [place] high risk fall precautions, as indicated  3. Provide frequent short contacts to provide reality reorientation, refocusing and direction  4. Decrease environmental stimuli, including noise as appropriate  5. Monitor and intervene to maintain adequate nutrition, hydration, elimination, sleep and activity  6. If unable to ensure safety without constant attention obtain sitter and review sitter guidelines with assigned personnel  7.  Initiate Psychosocial CNS and Spiritual Care consult, as indicated  Outcome: Progressing     Problem: Musculoskeletal - Adult  Goal: Return mobility to safest level of function  Outcome: Progressing  Goal: Return ADL status to a safe level of function  Outcome: Progressing

## 2022-12-04 NOTE — PROGRESS NOTES
Occupational Therapy  Facility/Department: [place] [id number] - MED SURG  Treatment Note    Name: Berkley Monge  : 1950  MRN: [id number]  Date of Service: 2022    Discharge [address]ations:  [address] [facility]          Patient Diagnosis(es): The primary encounter diagnosis was Acute encephalopathy. Diagnoses of MIYA (acute kidney injury) ([address] Elevated procalcitonin, and Acute urinary retention were also pertinent to this visit. Past Medical History:  has a past medical history of Acute cystitis without hematuria, Arthritis, Cerebellar infarct ([address] Cerebral artery occlusion with cerebral infarction ([address] Diabetes mellitus ([address] Diabetic nephropathy with proteinuria ([address] Diabetic peripheral neuropathy ([address] Diabetic ulcer of left great toe ([address] Diabetic ulcer of left midfoot associated with type 2 diabetes mellitus ([address] Disease of blood and blood forming organ, Dizziness, Hyperlipidemia, Hypertension, Hypomagnesemia, Moderate non-proliferative diabetic retinopathy ([address] Movement disorder, and Sepsis due to urinary tract infection ([address] Past Surgical History:  has a past surgical history that includes Pilonidal cyst excision; Tonsillectomy; Hypospadius correction; Foot surgery (Right, ); other surgical history (Left, 2016); Colonoscopy; Endoscopy, colon, diagnostic; Upper gastrointestinal endoscopy (2017); and Cataract removal (19 (L) 19 (R)). Assessment   Performance deficits / Impairments: Decreased functional mobility ; Decreased ADL status; Decreased cognition;Decreased safe awareness;Decreased balance;Decreased endurance  Assessment: Pt admitted for MIYA and fall from memory care unit. Pt seen for OT eval and tx. Extensive assist for basic ADLs d/t cognition and low endurance. He required mod A x2 for standing balance and taking steps in area near bed with hand held assist. Recommend SNF for skilled OT d/t decline from baseline.  Cont OT in acute care  REQUIRES OT FOLLOW-UP: Yes  Activity Tolerance  Activity Tolerance: Treatment limited secondary to decreased cognition;Treatment limited secondary to medical complications (free text) (pt with mild orthostatic hypotension with postural changes, minimal complaints offered re: dizziness;)  Activity Tolerance Comments: semi-angel's on RA:  /68, HR = 73, 98 % O 2 sats;     sitting EOB after standing ~ 1 minute: BP = 109/62;     pt assisted back to semi-angel's: BP = 132/68; RN notified        Plan   Occupational Therapy Plan  Times Per Week: 2-3x  Current Treatment Recommendations: Strengthening, Balance training, Functional mobility training, Endurance training, Self-Care / ADL, Safety education & training, Positioning     Restrictions  Restrictions/Precautions  Restrictions/Precautions: Fall Risk, Up as Tolerated, General Precautions  Position Activity Restriction  Other position/activity restrictions: Sitter at bedside, risk of elopement, nickerson, Stage IV wound on buttocks    Subjective   General  Chart Reviewed: Yes  Patient assessed for rehabilitation services?: Yes  Additional Pertinent Hx: Hx dementia  Family / Caregiver Present: No  Referring Practitioner: JANELLE Dumont  Diagnosis: MIYA, fall  Subjective  Subjective: pt denies pain when asked  General Comment  Comments: RN cleared pt for OT; pt awake in bed, agreeable to therapy            Objective                Safety Devices  Type of Devices: Call light within reach;Nurse notified; Bed alarm in place; Left in bed;Patient at risk for falls        AROM: Within functional limits  Strength:  Within functional limits  Coordination: Within functional limits  Tone: Normal  ADL  Grooming: Setup (in bed to wash face & hands with wash cloth)  Toileting: Dependent/Total (nickerson catheter)        Bed mobility  Supine to Sit: Moderate assistance (to Left)  Sit to Supine: Maximum assistance  Transfers  Stand Step Transfers: 2 Person assistance (mod assist of 2 with gait belt & RW for support)  Sit to stand: 2 Person assistance (mod assist of 2 mild posterior lean)  Stand to sit: 2 Person assistance (mod assist of 2 for safe hand placement)     Cognition  Overall Cognitive Status: Exceptions  Arousal/Alertness: Delayed responses to stimuli  Following Commands: Follows one step commands with repetition  Attention Span: Difficulty attending to directions  Memory: Decreased recall of biographical Information;Decreased short term memory;Decreased recall of recent events;Decreased long term memory;Decreased recall of precautions  Safety Judgement: Decreased awareness of need for safety;Decreased awareness of need for assistance  Insights: Not aware of deficits  Initiation: Requires cues for all  Sequencing: Requires cues for all  Cognition Comment: pleasant, cooperative, impulsive  Orientation  Overall Orientation Status: Impaired  Orientation Level: Disoriented to place; Disoriented to time;Disoriented to situation;Disoriented to person (Unable to provide his name without verbal cues)               Exercise Treatment: BUE AROM in semi-angel's  Hand flex/ext: x  10  Reps  Elbow flex/ext:  x    10 Reps  Forearm sup/pron:  x   10 Reps  Shld flex/ext:  x   10 Reps           Education Given To: Patient  Education Provided: Role of Therapy;Transfer Training;Plan of Care;Orientation; ADL Adaptive Strategies  Education Provided Comments: disease specific: importance of use RED/nurse call light for assist with ADL needs/transfers/positioning  Education Method: Demonstration;Verbal  Barriers to Learning: Cognition  Education Outcome: Unable to demonstrate understanding;Continued education needed    AM-PAC Score        AM-[facility] Inpatient Daily Activity Raw Score: 12 (12/04/22 1447)  AM-PAC Inpatient ADL T-Scale Score : 30.6 (12/04/22 1447)  ADL Inpatient CMS 0-100% Score: 66.57 (12/04/22 1447)  ADL Inpatient CMS G-Code Modifier : CL (12/04/22 1447)    [place] Score       Goals  Short Term Goals  Time Frame for Short Term Goals: 1 week (12/8) unless noted  Short Term Goal 1: Perform functional transfers with min A; 12/04 mod assist of 2  Short Term Goal 2: Perform 1-2 grooming tasks while seated with SBA; 12/04 in semi-angel's performed 1 grooming task of washing face, verbalized need to be shaved  Short Term Goal 3: Perform UE exer 15x each for endurance by 12/5; 12/04 supervision with 10 reps BUE AROM  Patient Goals   Patient goals : Pt did not provide       Therapy Time   Individual Concurrent Group Co-treatment   Time In 1210         Time Out [id number]         Minutes [address], [place]

## 2022-12-04 NOTE — PLAN OF CARE
Patient very agitated and attempting to get out of bed. Upon standing patient noted to have new skin tear to right calf. Patient eventually able to be redirected back to bed. Cleansed skin tear with wound cleanser. Flap replaced and steri-strips applied. Will continue to monitor.

## 2022-12-04 NOTE — PLAN OF CARE
Problem: Genitourinary - Adult  Goal: Absence of urinary retention  Outcome: Progressing  Flowsheets  Taken 12/3/2022 2220 by Caroline Salgado RN  Absence of urinary retention: Discuss catheterization for long term situations as appropriate  Taken 12/3/2022 1024 by Tamy Storm RN  Absence of urinary retention: Assess patients ability to void and empty bladder     Problem: Genitourinary - Adult  Goal: Urinary catheter remains patent  Outcome: Progressing  Flowsheets  Taken 12/3/2022 2220 by Caroline Salgado RN  Urinary catheter remains patent: Assess patency of urinary catheter  Taken 12/3/2022 1024 by Tamy Storm RN  Urinary catheter remains patent: Assess patency of urinary catheter

## 2022-12-05 LAB
GLUCOSE BLD-MCNC: 167 MG/DL (ref 70–99)
GLUCOSE BLD-MCNC: 170 MG/DL (ref 70–99)
GLUCOSE BLD-MCNC: 178 MG/DL (ref 70–99)
GLUCOSE BLD-MCNC: 235 MG/DL (ref 70–99)
PERFORMED ON: ABNORMAL
PRO-BNP: 808 PG/ML (ref 0–124)

## 2022-12-05 PROCEDURE — 6360000002 HC RX W HCPCS: Performed by: INTERNAL MEDICINE

## 2022-12-05 PROCEDURE — 1200000000 HC SEMI PRIVATE

## 2022-12-05 PROCEDURE — 6370000000 HC RX 637 (ALT 250 FOR IP): Performed by: INTERNAL MEDICINE

## 2022-12-05 PROCEDURE — 6370000000 HC RX 637 (ALT 250 FOR IP): Performed by: NURSE PRACTITIONER

## 2022-12-05 PROCEDURE — 2580000003 HC RX 258: Performed by: NURSE PRACTITIONER

## 2022-12-05 PROCEDURE — 83880 ASSAY OF NATRIURETIC PEPTIDE: CPT

## 2022-12-05 PROCEDURE — 36415 COLL VENOUS BLD VENIPUNCTURE: CPT

## 2022-12-05 PROCEDURE — 6370000000 HC RX 637 (ALT 250 FOR IP): Performed by: PSYCHIATRY & NEUROLOGY

## 2022-12-05 RX ORDER — MAGNESIUM SULFATE IN WATER 40 MG/ML
2000 INJECTION, SOLUTION INTRAVENOUS ONCE
Status: COMPLETED | OUTPATIENT
Start: 2022-12-05 | End: 2022-12-05

## 2022-12-05 RX ADMIN — APIXABAN 5 MG: 5 TABLET, FILM COATED ORAL at 20:54

## 2022-12-05 RX ADMIN — QUETIAPINE FUMARATE 50 MG: 25 TABLET ORAL at 17:23

## 2022-12-05 RX ADMIN — DIVALPROEX SODIUM 125 MG: 125 CAPSULE, COATED PELLETS ORAL at 20:54

## 2022-12-05 RX ADMIN — ATORVASTATIN CALCIUM 10 MG: 10 TABLET, FILM COATED ORAL at 20:54

## 2022-12-05 RX ADMIN — DIVALPROEX SODIUM 125 MG: 125 CAPSULE, COATED PELLETS ORAL at 09:12

## 2022-12-05 RX ADMIN — MAGNESIUM SULFATE HEPTAHYDRATE 2000 MG: 40 INJECTION, SOLUTION INTRAVENOUS at 10:37

## 2022-12-05 RX ADMIN — NITROFURANTOIN MONOHYDRATE/MACROCRYSTALS 100 MG: 75; 25 CAPSULE ORAL at 20:54

## 2022-12-05 RX ADMIN — INSULIN LISPRO 2 UNITS: 100 INJECTION, SOLUTION INTRAVENOUS; SUBCUTANEOUS at 13:08

## 2022-12-05 RX ADMIN — QUETIAPINE FUMARATE 12.5 MG: 25 TABLET ORAL at 09:11

## 2022-12-05 RX ADMIN — APIXABAN 5 MG: 5 TABLET, FILM COATED ORAL at 09:11

## 2022-12-05 RX ADMIN — SODIUM CHLORIDE, PRESERVATIVE FREE 10 ML: 5 INJECTION INTRAVENOUS at 20:14

## 2022-12-05 RX ADMIN — SERTRALINE HYDROCHLORIDE 25 MG: 50 TABLET ORAL at 09:11

## 2022-12-05 RX ADMIN — INSULIN GLARGINE 5 UNITS: 100 INJECTION, SOLUTION SUBCUTANEOUS at 20:55

## 2022-12-05 RX ADMIN — NITROFURANTOIN MONOHYDRATE/MACROCRYSTALS 100 MG: 75; 25 CAPSULE ORAL at 09:15

## 2022-12-05 RX ADMIN — SODIUM CHLORIDE, PRESERVATIVE FREE 10 ML: 5 INJECTION INTRAVENOUS at 10:32

## 2022-12-05 RX ADMIN — COLLAGENASE SANTYL: 250 OINTMENT TOPICAL at 17:25

## 2022-12-05 ASSESSMENT — PAIN SCALES - WONG BAKER

## 2022-12-05 NOTE — PROGRESS NOTES
Assessment complete and documented. BP elevated. All other vital signs stable. A&O to self only. Pt expresses no pain. Safety precautions in place. Bed locked, alarmed, and in lowest position. Call light and bedside table within reach. Sitter at bedside.

## 2022-12-05 NOTE — CARE COORDINATION
Chart reviewed. AMS, falls, MIYA, UTI. Management per urology, psych and IM. PO antimicrobial. Pt lives at [facility]. Pt has nickerson catheter, which was placed by urology for retention. Per urology, nickerson should remain in place for 1 week, until 12/7. Jaden Sparks will not be able to manage or remove nickerson. Wife requesting short skilled stay, until nickerson removed. [facility] able to accept and following. Pt needs to be sitter free for 24 hours. CM continues to follow.  Karen Crow RN

## 2022-12-05 NOTE — PLAN OF CARE
Problem: Genitourinary - Adult  Goal: Urinary catheter remains patent  12/4/2022 1946 by Dequan Robertson RN  Outcome: Progressing  Flowsheets (Taken 12/4/2022 1946)  Urinary catheter remains patent: Assess patency of urinary catheter

## 2022-12-05 NOTE — PROGRESS NOTES
Hospitalist Progress Note      PCP: Tom Long    Date of Admission: 11/28/2022    Chief Complaint: Confusion, urinary retention    Hospital Course: Presented with confusion. Found to have acute kidney injury and urinary retention. Improved after Valenzuela catheter insertion by urology. Also noted to have elevated troponin and elevated procalcitonin. On empiric antibiotics. Subjective:     Patient pleasantly confused. No new complaints. No nausea vomiting. No fevers or chills. Patient eating well. Medications:  Reviewed    Infusion Medications    sodium chloride 25 mL/hr at 12/03/22 1559    dextrose       Scheduled Medications    nitrofurantoin (macrocrystal-monohydrate)  100 mg Oral 2 times per day    collagenase   Topical Daily    QUEtiapine  12.5 mg Oral Daily    QUEtiapine  50 mg Oral Dinner    atorvastatin  10 mg Oral Nightly    insulin glargine  5 Units SubCUTAneous Nightly    sodium chloride flush  5-40 mL IntraVENous 2 times per day    insulin lispro  0-8 Units SubCUTAneous TID WC    insulin lispro  0-4 Units SubCUTAneous Nightly    apixaban  5 mg Oral BID    sertraline  25 mg Oral Daily    divalproex  125 mg Oral 2 times per day     PRN Meds: magnesium sulfate, sodium chloride flush, sodium chloride, ondansetron **OR** ondansetron, acetaminophen **OR** acetaminophen, glucose, dextrose bolus **OR** dextrose bolus, glucagon (rDNA), dextrose, ziprasidone      Intake/Output Summary (Last 24 hours) at 12/4/2022 2039  Last data filed at 12/4/2022 1831  Gross per 24 hour   Intake 1300 ml   Output 2525 ml   Net -1225 ml         Physical Exam Performed:    /67   Pulse 82   Temp 98.2 °F (36.8 °C) (Oral)   Resp 16   Ht 6' 4\" (1.93 m)   Wt 181 lb 10.5 oz (82.4 kg)   SpO2 98%   BMI 22.11 kg/m²     General appearance: No apparent distress, appears stated age and cooperative. HEENT: Pupils equal, round, and reactive to light. Conjunctivae/corneas clear.   Neck: Supple, with full range of motion. No jugular venous distention. Trachea midline. Respiratory:  Normal respiratory effort. Clear to auscultation, bilaterally without Rales/Wheezes/Rhonchi. Cardiovascular: Regular rate and rhythm with normal S1/S2 without murmurs, rubs or gallops. Abdomen: Soft, non-tender, non-distended with normal bowel sounds. Musculoskeletal: No clubbing, cyanosis or edema bilaterally. Full range of motion without deformity. Skin: unstageable 4 cm sacral decubitus that was present on arrival.  : Valenzuela catheter in place. Neurologic:  Neurovascularly intact without any focal sensory/motor deficits. Cranial nerves: II-XII intact, grossly non-focal.  Psychiatric: Patient pleasantly confused. Alert to person. Capillary Refill: Brisk, 3 seconds, normal   Peripheral Pulses: +2 palpable, equal bilaterally       Labs:   No results for input(s): WBC, HGB, HCT, PLT in the last 72 hours. Recent Labs     12/02/22  0718 12/03/22  0636 12/04/22  0534    137 136   K 4.0 3.8 3.7    100 100   CO2 30 32 30   BUN 14 13 13   CREATININE 0.7* 0.6* 0.6*   CALCIUM 8.9 8.8 8.7       No results for input(s): AST, ALT, BILIDIR, BILITOT, ALKPHOS in the last 72 hours. No results for input(s): INR in the last 72 hours. No results for input(s): Abiola [place] in the last 72 hours. Urinalysis:      Lab Results   Component Value Date/Time    NITRU Negative 11/29/2022 10:50 AM    WBCUA  11/29/2022 10:50 AM    BACTERIA 4+ 11/29/2022 10:50 AM    RBCUA 5-10 11/29/2022 10:50 AM    BLOODU SMALL 11/29/2022 10:50 AM    SPECGRAV 1.020 11/29/2022 10:50 AM    GLUCOSEU Negative 11/29/2022 10:50 AM       Radiology:  CT CERVICAL SPINE WO CONTRAST   Final Result   No acute abnormality of the cervical spine. CT HEAD WO CONTRAST   Final Result   No acute intracranial abnormality. Moderate diffuse cerebral atrophy. Mild cerebral white matter chronic microvascular ischemic disease.       Bilateral maxillary chronic sinusitis. XR CHEST PORTABLE   Final Result   No acute cardiopulmonary abnormality. Lung parenchymal changes suggestive of chronic interstitial lung disease. IP CONSULT TO UROLOGY  IP CONSULT TO PHARMACY  IP CONSULT TO UROLOGY  IP CONSULT TO PSYCHIATRY    Assessment/Plan:    Active Hospital Problems    Diagnosis     MIYA (acute kidney injury) ([facility] [address] [N17.9]      Priority: Medium    Diabetic peripheral neuropathy ([facility] [address] [E11.42]      Priority: Medium    Acute combined systolic and diastolic heart failure (HCC) [I50.41]     Alzheimer's disease ([facility] [address] [G30.9, F02.80]     Essential hypertension [I10]     Mixed hyperlipidemia [E78.2]     BPH with obstruction/lower urinary tract symptoms [N40.1, N13.8]      PLAN    1. Enteroccocus UTI. Patient with elevated procalcitonin. Discontinue cefepime and vancomycin. Continue nitrofurantoin. 2.  Unstageable sacral decubitus ulcer. Wound care team is following. We will continue frequent turns. 3.  Metabolic encephalopathy with underlying dementia. Patient lives in the memory care unit. Patient may need SNF placement. sitter still required. 4.  MIYA secondary to postobstructive uropathy? .  Patient will continue Valenzuela catheter for 1 week. Urology is following. Creatinine normalized. 5.  Paroxysmal atrial fibrillation. Patient on Eliquis. Will monitor rate closely. Continue telemetry. 6.  Type 2 diabetes. Continue insulin sliding scale. Continue basal insulin. 7.  Electrolyte abnormalities. We will correct and continue to monitor. Improved. DVT Prophylaxis: Eliquis  Diet: ADULT DIET; Regular  ADULT ORAL NUTRITION SUPPLEMENT; Breakfast, Dinner; Diabetic Oral Supplement  Code Status: DNR-CCA  PT/OT Eval Status: Ordered    Dispo - SNF when stable.     Appropriate for A1 Discharge Unit: Bernadette Simmons MD

## 2022-12-05 NOTE — PROGRESS NOTES
Patient's EF (Ejection Fraction) is 40%    Heart Failure Medications:  Diuretics[de-identified] None    (One of the following REQUIRED for EF </= 40%/SYSTOLIC FAILURE but MAY be used in EF% >40%/DIASTOLIC FAILURE)        ACE[de-identified] None        ARB[de-identified] None         ARNI[de-identified] None    (Beta Blockers)  NON- Evidenced Based Beta Blocker (for EF% >40%/DIASTOLIC FAILURE): None    Evidenced Based Beta Blocker::(REQUIRED for EF% <40%/SYSTOLIC FAILURE) None  . .................................................................................................................................................. Patient's weights and intake/output reviewed: Yes    Patient's Last Weight: 182.5 lbs obtained by bed scale. Difference of 1 lbs more than last documented weight. Intake/Output Summary (Last 24 hours) at 12/5/2022 1846  Last data filed at 12/5/2022 1432  Gross per 24 hour   Intake 480 ml   Output 2725 ml   Net -2245 ml       Education Booklet Provided: yes    Comorbidities Reviewed Yes    Patient has a past medical history of Acute cystitis without hematuria, Arthritis, Cerebellar infarct ([address] Cerebral artery occlusion with cerebral infarction ([address] Diabetes mellitus ([address] Diabetic nephropathy with proteinuria ([address] Diabetic peripheral neuropathy ([address] Diabetic ulcer of left great toe ([address] Diabetic ulcer of left midfoot associated with type 2 diabetes mellitus ([address] Disease of blood and blood forming organ, Dizziness, Hyperlipidemia, Hypertension, Hypomagnesemia, Moderate non-proliferative diabetic retinopathy ([address] Movement disorder, and Sepsis due to urinary tract infection ([facility] [address]      >>For CHF and Comorbidity documentation on Education Time and Topics, please see Education Tab    Progressive Mobility Assessment:  What is this patient's Current Level of Mobility?: Ambulatory- with Assistance  How was this patient Mobilized today?: Patient Refuses to Mobilize, ambulated 0 ft                 With Whom? none             Level of Difficulty/Assistance: 1x Assist     Pt resting in bed at this time on room air. Pt denies shortness of breath. Pt with nonpitting lower extremity edema.      Patient and/or Family's stated Goal of Care this Admission: reduce shortness of breath, increase activity tolerance, better understand heart failure and disease management, be more comfortable, and reduce lower extremity edema prior to discharge        :

## 2022-12-05 NOTE — DISCHARGE SUMMARY
Hospitalist Progress Note      PCP: Lianna Adkins    Date of Admission: 11/28/2022    Chief Complaint: Confusion, urinary retention    Subjective: No new c/o. Medications:  Reviewed    Infusion Medications    sodium chloride 25 mL/hr at 12/03/22 1559    dextrose       Scheduled Medications    nitrofurantoin (macrocrystal-monohydrate)  100 mg Oral 2 times per day    collagenase   Topical Daily    QUEtiapine  12.5 mg Oral Daily    QUEtiapine  50 mg Oral Dinner    atorvastatin  10 mg Oral Nightly    insulin glargine  5 Units SubCUTAneous Nightly    sodium chloride flush  5-40 mL IntraVENous 2 times per day    insulin lispro  0-8 Units SubCUTAneous TID WC    insulin lispro  0-4 Units SubCUTAneous Nightly    apixaban  5 mg Oral BID    sertraline  25 mg Oral Daily    divalproex  125 mg Oral 2 times per day     PRN Meds: sodium chloride flush, sodium chloride, ondansetron **OR** ondansetron, acetaminophen **OR** acetaminophen, glucose, dextrose bolus **OR** dextrose bolus, glucagon (rDNA), dextrose, ziprasidone      Intake/Output Summary (Last 24 hours) at 12/5/2022 1012  Last data filed at 12/5/2022 0953  Gross per 24 hour   Intake 1300 ml   Output 3325 ml   Net -2025 ml       Physical Exam Performed:    BP (!) 167/82   Pulse 69   Temp 98.4 °F (36.9 °C) (Oral)   Resp 16   Ht 6' 4\" (1.93 m)   Wt 182 lb 5.1 oz (82.7 kg)   SpO2 97%   BMI 22.19 kg/m²     General appearance: No apparent distress, appears stated age and cooperative. HEENT: Pupils equal, round, and reactive to light. Conjunctivae/corneas clear. Neck: Supple, with full range of motion. No jugular venous distention. Trachea midline. Respiratory:  Normal respiratory effort. Clear to auscultation, bilaterally without Rales/Wheezes/Rhonchi. Cardiovascular: Regular rate and rhythm with normal S1/S2 without murmurs, rubs or gallops. Abdomen: Soft, non-tender, non-distended with normal bowel sounds.   Musculoskeletal: No clubbing, cyanosis or edema bilaterally. Full range of motion without deformity. Skin: Skin color, texture, turgor normal.  No rashes or lesions. Neurologic:  Neurovascularly intact without any focal sensory/motor deficits. Cranial nerves: II-XII intact, grossly non-focal.  Psychiatric: Alert and oriented, thought content appropriate, normal insight  Capillary Refill: Brisk,< 3 seconds   Peripheral Pulses: +2 palpable, equal bilaterally       Labs:   No results for input(s): WBC, HGB, HCT, PLT in the last 72 hours. Recent Labs     12/03/22  0636 12/04/22  0534    136   K 3.8 3.7    100   CO2 32 30   BUN 13 13   CREATININE 0.6* 0.6*   CALCIUM 8.8 8.7     No results for input(s): AST, ALT, BILIDIR, BILITOT, ALKPHOS in the last 72 hours. No results for input(s): INR in the last 72 hours. No results for input(s): Marshawn Forget in the last 72 hours. Urinalysis:      Lab Results   Component Value Date/Time    NITRU Negative 11/29/2022 10:50 AM    WBCUA  11/29/2022 10:50 AM    BACTERIA 4+ 11/29/2022 10:50 AM    RBCUA 5-10 11/29/2022 10:50 AM    BLOODU SMALL 11/29/2022 10:50 AM    SPECGRAV 1.020 11/29/2022 10:50 AM    GLUCOSEU Negative 11/29/2022 10:50 AM       Consults:    IP CONSULT TO UROLOGY  IP CONSULT TO PHARMACY  IP CONSULT TO UROLOGY  IP CONSULT TO PSYCHIATRY      Assessment/Plan:    Active Hospital Problems    Diagnosis     MIYA (acute kidney injury) ([facility] [address] [N17.9]      Priority: Medium    Diabetic peripheral neuropathy ([facility] [address] [E11.42]      Priority: Medium    Acute combined systolic and diastolic heart failure (HCC) [I50.41]     Alzheimer's disease ([facility] [address] [G30.9, F02.80]     UTI (urinary tract infection) [N39.0]     Essential hypertension [I10]     Mixed hyperlipidemia [E78.2]     BPH with obstruction/lower urinary tract symptoms [N40.1, N13.8]        Enteroccocus UTI. Patient with elevated procalcitonin. Discontinue cefepime and vancomycin. Continue nitrofurantoin.       Unstageable sacral decubitus ulcer.  Wound care team is following. We will continue frequent turns. Metabolic encephalopathy with underlying dementia. Patient lives in the memory care unit. Patient may need SNF placement. sitter still required. MIYA secondary to postobstructive uropathy? .  Patient will continue Valenzuela catheter for 1 week. Urology is following. Creatinine normalized. Paroxysmal atrial fibrillation. Patient on Eliquis. Will monitor rate closely. Continue telemetry. Type 2 diabetes. Continue insulin sliding scale. Continue basal insulin. Electrolyte abnormalities. We will correct and continue to monitor. Improved. DVT Prophylaxis: Eliquis     No results for input(s): PLT in the last 72 hours. Diet: ADULT DIET; Regular  ADULT ORAL NUTRITION SUPPLEMENT; Breakfast, Dinner; Diabetic Oral Supplement  Code Status: DNR-CCA      PT/OT Eval Status: seen w/ recs for SNF    Dispo - Patient is likely to remain in-house at least until Tues/Wed 6/7 Dec pending clinical course, subspecialty recs and placement decision.        Gloria Thrasher MD

## 2022-12-05 NOTE — PLAN OF CARE
Problem: Discharge Planning  Goal: Discharge to home or other facility with appropriate resources  Outcome: Progressing     Problem: Pain  Goal: Verbalizes/displays adequate comfort level or baseline comfort level  Outcome: Progressing     Problem: Safety - Adult  Goal: Free from fall injury  Outcome: Progressing     Problem: Chronic Conditions and Co-morbidities  Goal: Patient's chronic conditions and co-morbidity symptoms are monitored and maintained or improved  Outcome: Progressing     Problem: Skin/Tissue Integrity  Goal: Absence of new skin breakdown  Description: 1. Monitor for areas of redness and/or skin breakdown  2. Assess vascular access sites hourly  3. Every 4-6 hours minimum:  Change oxygen saturation probe site  4. Every 4-6 hours:  If on nasal continuous positive airway pressure, respiratory therapy assess nares and determine need for appliance change or resting period.   Outcome: Progressing     Problem: Cardiovascular - Adult  Goal: Maintains optimal cardiac output and hemodynamic stability  Outcome: Progressing  Goal: Absence of cardiac dysrhythmias or at baseline  Outcome: Progressing     Problem: Metabolic/Fluid and Electrolytes - Adult  Goal: Electrolytes maintained within normal limits  Outcome: Progressing  Goal: Hemodynamic stability and optimal renal function maintained  Outcome: Progressing  Goal: Glucose maintained within prescribed range  Outcome: Progressing     Problem: Hematologic - Adult  Goal: Maintains hematologic stability  Outcome: Progressing     Problem: Nutrition Deficit:  Goal: Optimize nutritional status  Outcome: Progressing     Problem: Neurosensory - Adult  Goal: Achieves stable or improved neurological status  Outcome: Progressing     Problem: Skin/Tissue Integrity - Adult  Goal: Skin integrity remains intact  Outcome: Progressing     Problem: Genitourinary - Adult  Goal: Absence of urinary retention  Outcome: Progressing  Goal: Urinary catheter remains patent  Outcome: Progressing     Problem: Confusion  Goal: Confusion, delirium, dementia, or psychosis is improved or at baseline  Description: INTERVENTIONS:  1. Assess for possible contributors to thought disturbance, including medications, impaired vision or hearing, underlying metabolic abnormalities, dehydration, psychiatric diagnoses, and notify attending LIP  2. [place] high risk fall precautions, as indicated  3. Provide frequent short contacts to provide reality reorientation, refocusing and direction  4. Decrease environmental stimuli, including noise as appropriate  5. Monitor and intervene to maintain adequate nutrition, hydration, elimination, sleep and activity  6. If unable to ensure safety without constant attention obtain sitter and review sitter guidelines with assigned personnel  7.  Initiate Psychosocial CNS and Spiritual Care consult, as indicated  Outcome: Progressing     Problem: Musculoskeletal - Adult  Goal: Return mobility to safest level of function  Outcome: Progressing  Goal: Return ADL status to a safe level of function  Outcome: Progressing

## 2022-12-06 LAB
ALBUMIN SERPL-MCNC: 3.3 G/DL (ref 3.4–5)
ANION GAP SERPL CALCULATED.3IONS-SCNC: 7 MMOL/L (ref 3–16)
BUN BLDV-MCNC: 16 MG/DL (ref 7–20)
CALCIUM SERPL-MCNC: 8.8 MG/DL (ref 8.3–10.6)
CHLORIDE BLD-SCNC: 100 MMOL/L (ref 99–110)
CO2: 30 MMOL/L (ref 21–32)
CREAT SERPL-MCNC: 0.7 MG/DL (ref 0.8–1.3)
GFR SERPL CREATININE-BSD FRML MDRD: >60 ML/MIN/{1.73_M2}
GLUCOSE BLD-MCNC: 172 MG/DL (ref 70–99)
GLUCOSE BLD-MCNC: 182 MG/DL (ref 70–99)
GLUCOSE BLD-MCNC: 195 MG/DL (ref 70–99)
GLUCOSE BLD-MCNC: 202 MG/DL (ref 70–99)
GLUCOSE BLD-MCNC: 271 MG/DL (ref 70–99)
HCT VFR BLD CALC: 33.1 % (ref 40.5–52.5)
HEMOGLOBIN: 11.2 G/DL (ref 13.5–17.5)
MAGNESIUM: 1.8 MG/DL (ref 1.8–2.4)
MCH RBC QN AUTO: 29.6 PG (ref 26–34)
MCHC RBC AUTO-ENTMCNC: 33.8 G/DL (ref 31–36)
MCV RBC AUTO: 87.6 FL (ref 80–100)
PDW BLD-RTO: 14.5 % (ref 12.4–15.4)
PERFORMED ON: ABNORMAL
PHOSPHORUS: 3.1 MG/DL (ref 2.5–4.9)
PLATELET # BLD: 372 K/UL (ref 135–450)
PMV BLD AUTO: 7.4 FL (ref 5–10.5)
POTASSIUM SERPL-SCNC: 4.3 MMOL/L (ref 3.5–5.1)
RBC # BLD: 3.78 M/UL (ref 4.2–5.9)
SODIUM BLD-SCNC: 137 MMOL/L (ref 136–145)
WBC # BLD: 6.5 K/UL (ref 4–11)

## 2022-12-06 PROCEDURE — 1200000000 HC SEMI PRIVATE

## 2022-12-06 PROCEDURE — 2580000003 HC RX 258: Performed by: NURSE PRACTITIONER

## 2022-12-06 PROCEDURE — 6370000000 HC RX 637 (ALT 250 FOR IP): Performed by: INTERNAL MEDICINE

## 2022-12-06 PROCEDURE — 83735 ASSAY OF MAGNESIUM: CPT

## 2022-12-06 PROCEDURE — 85027 COMPLETE CBC AUTOMATED: CPT

## 2022-12-06 PROCEDURE — 80069 RENAL FUNCTION PANEL: CPT

## 2022-12-06 PROCEDURE — 6370000000 HC RX 637 (ALT 250 FOR IP): Performed by: NURSE PRACTITIONER

## 2022-12-06 PROCEDURE — 6370000000 HC RX 637 (ALT 250 FOR IP): Performed by: PSYCHIATRY & NEUROLOGY

## 2022-12-06 PROCEDURE — 6360000002 HC RX W HCPCS: Performed by: INTERNAL MEDICINE

## 2022-12-06 PROCEDURE — 97110 THERAPEUTIC EXERCISES: CPT

## 2022-12-06 PROCEDURE — 36415 COLL VENOUS BLD VENIPUNCTURE: CPT

## 2022-12-06 RX ORDER — TAMSULOSIN HYDROCHLORIDE 0.4 MG/1
0.4 CAPSULE ORAL DAILY
Status: DISCONTINUED | OUTPATIENT
Start: 2022-12-07 | End: 2022-12-09 | Stop reason: HOSPADM

## 2022-12-06 RX ORDER — TAMSULOSIN HYDROCHLORIDE 0.4 MG/1
0.8 CAPSULE ORAL ONCE
Status: COMPLETED | OUTPATIENT
Start: 2022-12-06 | End: 2022-12-06

## 2022-12-06 RX ADMIN — QUETIAPINE FUMARATE 12.5 MG: 25 TABLET ORAL at 08:43

## 2022-12-06 RX ADMIN — NITROFURANTOIN MONOHYDRATE/MACROCRYSTALS 100 MG: 75; 25 CAPSULE ORAL at 20:57

## 2022-12-06 RX ADMIN — TAMSULOSIN HYDROCHLORIDE 0.8 MG: 0.4 CAPSULE ORAL at 10:19

## 2022-12-06 RX ADMIN — ATORVASTATIN CALCIUM 10 MG: 10 TABLET, FILM COATED ORAL at 19:59

## 2022-12-06 RX ADMIN — SODIUM CHLORIDE, PRESERVATIVE FREE 10 ML: 5 INJECTION INTRAVENOUS at 08:44

## 2022-12-06 RX ADMIN — DIVALPROEX SODIUM 125 MG: 125 CAPSULE, COATED PELLETS ORAL at 08:43

## 2022-12-06 RX ADMIN — ZIPRASIDONE MESYLATE 10 MG: 20 INJECTION, POWDER, LYOPHILIZED, FOR SOLUTION INTRAMUSCULAR at 19:58

## 2022-12-06 RX ADMIN — QUETIAPINE FUMARATE 50 MG: 25 TABLET ORAL at 17:21

## 2022-12-06 RX ADMIN — APIXABAN 5 MG: 5 TABLET, FILM COATED ORAL at 08:43

## 2022-12-06 RX ADMIN — COLLAGENASE SANTYL: 250 OINTMENT TOPICAL at 08:44

## 2022-12-06 RX ADMIN — NITROFURANTOIN MONOHYDRATE/MACROCRYSTALS 100 MG: 75; 25 CAPSULE ORAL at 08:43

## 2022-12-06 RX ADMIN — SODIUM CHLORIDE, PRESERVATIVE FREE 10 ML: 5 INJECTION INTRAVENOUS at 19:59

## 2022-12-06 RX ADMIN — INSULIN GLARGINE 5 UNITS: 100 INJECTION, SOLUTION SUBCUTANEOUS at 20:58

## 2022-12-06 RX ADMIN — DIVALPROEX SODIUM 125 MG: 125 CAPSULE, COATED PELLETS ORAL at 19:59

## 2022-12-06 RX ADMIN — ACETAMINOPHEN 650 MG: 325 TABLET ORAL at 20:57

## 2022-12-06 RX ADMIN — APIXABAN 5 MG: 5 TABLET, FILM COATED ORAL at 19:59

## 2022-12-06 RX ADMIN — SERTRALINE HYDROCHLORIDE 25 MG: 50 TABLET ORAL at 08:43

## 2022-12-06 ASSESSMENT — PAIN SCALES - WONG BAKER

## 2022-12-06 NOTE — PLAN OF CARE
Problem: Discharge Planning  Goal: Discharge to home or other facility with appropriate resources  12/6/2022 1056 by Niko Irene RN  Outcome: Progressing  12/6/2022 0041 by Margarita Butler RN  Outcome: Progressing     Problem: Pain  Goal: Verbalizes/displays adequate comfort level or baseline comfort level  12/6/2022 1056 by Niko Irene RN  Outcome: Progressing  12/6/2022 0041 by Margarita Butler RN  Outcome: Progressing     Problem: Safety - Adult  Goal: Free from fall injury  12/6/2022 1056 by Niko Irene RN  Outcome: Progressing  12/6/2022 0041 by Margarita Butler RN  Outcome: Progressing     Problem: Chronic Conditions and Co-morbidities  Goal: Patient's chronic conditions and co-morbidity symptoms are monitored and maintained or improved  12/6/2022 1056 by Niko Irene RN  Outcome: Progressing  12/6/2022 0041 by Margarita Butler RN  Outcome: Progressing  Flowsheets (Taken 12/5/2022 2307)  Care Plan - Patient's Chronic Conditions and Co-Morbidity Symptoms are Monitored and Maintained or Improved: Monitor and assess patient's chronic conditions and comorbid symptoms for stability, deterioration, or improvement     Problem: Skin/Tissue Integrity  Goal: Absence of new skin breakdown  Description: 1. Monitor for areas of redness and/or skin breakdown  2. Assess vascular access sites hourly  3. Every 4-6 hours minimum:  Change oxygen saturation probe site  4. Every 4-6 hours:  If on nasal continuous positive airway pressure, respiratory therapy assess nares and determine need for appliance change or resting period.   12/6/2022 1056 by Niko Irene RN  Outcome: Progressing  12/6/2022 0041 by Margarita Butler RN  Outcome: Progressing     Problem: Cardiovascular - Adult  Goal: Maintains optimal cardiac output and hemodynamic stability  12/6/2022 1056 by Niko Irene RN  Outcome: Progressing  12/6/2022 0041 by Margarita Butler RN  Outcome: Progressing  Goal: Absence of cardiac dysrhythmias or at baseline  12/6/2022 1056 by Violet Frazier RN  Outcome: Progressing  12/6/2022 0041 by Maria A Diane RN  Outcome: Progressing     Problem: Metabolic/Fluid and Electrolytes - Adult  Goal: Electrolytes maintained within normal limits  12/6/2022 1056 by Violet Frazier RN  Outcome: Progressing  12/6/2022 0041 by Maria A Diane RN  Outcome: Progressing  Goal: Hemodynamic stability and optimal renal function maintained  12/6/2022 1056 by Violet Frazier RN  Outcome: Progressing  12/6/2022 0041 by Maria A Diane RN  Outcome: Progressing  Goal: Glucose maintained within prescribed range  12/6/2022 1056 by Violet Frazier RN  Outcome: Progressing  12/6/2022 0041 by Maria A Diane RN  Outcome: Progressing     Problem: Hematologic - Adult  Goal: Maintains hematologic stability  12/6/2022 1056 by Violet Frazier RN  Outcome: Progressing  12/6/2022 0041 by Maria A Diane RN  Outcome: Progressing  Flowsheets (Taken 12/5/2022 2307)  Maintains hematologic stability: Assess for signs and symptoms of bleeding or hemorrhage     Problem: Nutrition Deficit:  Goal: Optimize nutritional status  12/6/2022 1056 by Violet Frazier RN  Outcome: Progressing  12/6/2022 0041 by Maria A Diane RN  Outcome: Progressing     Problem: Neurosensory - Adult  Goal: Achieves stable or improved neurological status  12/6/2022 1056 by Violet Frazier RN  Outcome: Progressing  12/6/2022 0041 by Maria A Diane RN  Outcome: Progressing     Problem: Skin/Tissue Integrity - Adult  Goal: Skin integrity remains intact  12/6/2022 1056 by Violet Frazier RN  Outcome: Progressing  12/6/2022 0041 by Maria A Diane RN  Outcome: Progressing     Problem: Genitourinary - Adult  Goal: Absence of urinary retention  12/6/2022 1056 by Violet Frazier RN  Outcome: Progressing  12/6/2022 0041 by Maria A Diane RN  Outcome: Progressing  Goal: Urinary catheter remains patent  12/6/2022 1056 by Violet Frazier RN  Outcome: Progressing  12/6/2022 0041 by Maria A Diane RN  Outcome: Progressing  Flowsheets (Taken 12/5/2022 0279)  Urinary catheter remains patent: Assess patency of urinary catheter     Problem: Confusion  Goal: Confusion, delirium, dementia, or psychosis is improved or at baseline  Description: INTERVENTIONS:  1. Assess for possible contributors to thought disturbance, including medications, impaired vision or hearing, underlying metabolic abnormalities, dehydration, psychiatric diagnoses, and notify attending LIP  2. [place] high risk fall precautions, as indicated  3. Provide frequent short contacts to provide reality reorientation, refocusing and direction  4. Decrease environmental stimuli, including noise as appropriate  5. Monitor and intervene to maintain adequate nutrition, hydration, elimination, sleep and activity  6. If unable to ensure safety without constant attention obtain sitter and review sitter guidelines with assigned personnel  7.  Initiate Psychosocial CNS and Spiritual Care consult, as indicated  12/6/2022 1056 by Librado Whitten RN  Outcome: Progressing  12/6/2022 0041 by Frances Trujillo RN  Outcome: Progressing     Problem: Musculoskeletal - Adult  Goal: Return mobility to safest level of function  12/6/2022 1056 by Librado Whitten RN  Outcome: Progressing  12/6/2022 0041 by Frances Trujillo RN  Outcome: Progressing  Goal: Return ADL status to a safe level of function  12/6/2022 1056 by Librado Whitten RN  Outcome: Progressing  12/6/2022 0041 by Frances Trujillo RN  Outcome: Progressing

## 2022-12-06 NOTE — PROGRESS NOTES
Physical Therapy  Facility/Department: [facility] B3 - MED SURG  Daily Treatment Note  NAME: Diandra Mike  : 1950  MRN: [id number]    Date of Service: 2022    Discharge Recommendations:  Subacute/Skilled Nursing Facility   PT Equipment Recommendations  Equipment Needed: No  Other: defer to next level of care    Patient Diagnosis(es): The primary encounter diagnosis was Acute encephalopathy. Diagnoses of MIYA (acute kidney injury) ([address] Elevated procalcitonin, and Acute urinary retention were also pertinent to this visit. Assessment   Assessment: Pt pleasant and agreeable to work with PT today. Completed B LE and UE in bed, requires max verbal and tactile cues to compelte exericses due to cognition. Not safe to attempt supine<>sit and transfers this date due to single treat and pt requiring Ax2 for bed mobility and transfers. Will continue to follow pt in acute care. Continue to rec SNF upon DC when deemed medically appropriate. Activity Tolerance: Patient tolerated treatment well;Treatment limited secondary to decreased cognition  Equipment Needed: No  Other: defer to next level of care     Plan    Physcial Therapy Plan  General Plan: 3-5 times per week  Specific Instructions for Next Treatment: Progress mobility as tolerated  Current Treatment Recommendations: Strengthening;Home exercise program;Balance training;Gait training; Safety education & training; Therapeutic activities; Functional mobility training;Patient/Caregiver education & training;Neuromuscular re-education;Transfer training;Equipment evaluation, education, & procurement;Positioning; Endurance training     Restrictions  Restrictions/Precautions  Restrictions/Precautions: Fall Risk, Up as Tolerated, General Precautions  Position Activity Restriction  Other position/activity restrictions: Sitter at bedside, risk of elopement, nickerson, Stage IV wound on buttocks     Subjective    Subjective  Subjective: Pt in bed upon arrival with sitter in room, pleasantly confused, RN cleared for therpay. pt has difficulty expressing himself coherently secondary to cognition, agreeable to PT/OT tx. Pain: denies pain at rest  Orientation  Overall Orientation Status: Impaired  Orientation Level: Disoriented to place; Disoriented to time;Disoriented to situation;Disoriented to person     Objective   Vitals  Heart Rate: 78  BP: 135/63  BP Location: Left upper arm  MAP (Calculated): 87  SpO2: 99 %  Bed Mobility Training  Bed Mobility Training: No  Transfer Training  Transfer Training: No     PT Exercises  Exercise Treatment: Supine BLE exericses 10-12x AP, heel slides, hip abduction, SLR, elbow flexion, shoulder flexion. Required VC and TC to perform due to impaired cognition. Safety Devices  Type of Devices: Call light within reach;Nurse notified; Bed alarm in place; Left in bed;Patient at risk for falls;Sitter present;Gait belt  Restraints  Restraints Initially in Place: No     [facility] 6 Clicks Inpatient Mobility:  AM-PAC Mobility Inpatient   How much difficulty turning over in bed?: A Little  How much difficulty sitting down on / standing up from a chair with arms?: A Lot  How much difficulty moving from lying on back to sitting on side of bed?: A Lot  How much help from another person moving to and from a bed to a chair?: A Lot  How much help from another person needed to walk in hospital room?: A Lot  How much help from another person for climbing 3-5 steps with a railing?: Total  AM-PAC Inpatient Mobility Raw Score : 12  AM-PAC Inpatient T-Scale Score : 35.33  Mobility Inpatient CMS 0-100% Score: 68.66  Mobility Inpatient CMS G-Code Modifier : CL    Goals  Short Term Goals  Time Frame for Short Term Goals: 1 week 12/08/22 (unless otherwise specified)  Short Term Goal 1: 12/05/22: Pt will complete bed mobility with SBA -- Extend to 12/9/22  Short Term Goal 2: Pt will complete sit to/from stand with LRAD with CGA  Short Term Goal 3: Pt will ambulate 48' with LRAD with CGA without LOB  Patient Goals   Patient Goals : Pt is unable to state d/t cognition    Education  Patient Education  Education Given To: Patient  Education Provided: Role of Therapy;Plan of Care;Orientation;Home Exercise Program  Education Provided Comments: Educated on safety with mobility, PCA educated on safety with bed mobility and t/f  Education Method: Demonstration;Verbal  Barriers to Learning: Cognition; Hearing  Education Outcome: Unable to demonstrate understanding; Unable to verbalize;Continued education needed    Therapy Time   Individual Concurrent Group Co-treatment   Time In 1605         Time Out 1624         Minutes 19         Timed Code Treatment Minutes: Marianela 37, PT, DPT    If pt is unable to be seen after this session, please let this note serve as discharge summary. Please see case management note for discharge disposition. Thank you.

## 2022-12-06 NOTE — PROGRESS NOTES
Hospitalist Progress Note      PCP: Rebeca Miller    Date of Admission: 11/28/2022    Chief Complaint: Confusion, urinary retention    Subjective: No new c/o. Medications:  Reviewed    Infusion Medications    sodium chloride 25 mL/hr at 12/03/22 1559    dextrose       Scheduled Medications    nitrofurantoin (macrocrystal-monohydrate)  100 mg Oral 2 times per day    collagenase   Topical Daily    QUEtiapine  12.5 mg Oral Daily    QUEtiapine  50 mg Oral Dinner    atorvastatin  10 mg Oral Nightly    insulin glargine  5 Units SubCUTAneous Nightly    sodium chloride flush  5-40 mL IntraVENous 2 times per day    insulin lispro  0-8 Units SubCUTAneous TID WC    insulin lispro  0-4 Units SubCUTAneous Nightly    apixaban  5 mg Oral BID    sertraline  25 mg Oral Daily    divalproex  125 mg Oral 2 times per day     PRN Meds: sodium chloride flush, sodium chloride, ondansetron **OR** ondansetron, acetaminophen **OR** acetaminophen, glucose, dextrose bolus **OR** dextrose bolus, glucagon (rDNA), dextrose, ziprasidone      Intake/Output Summary (Last 24 hours) at 12/6/2022 0930  Last data filed at 12/6/2022 0511  Gross per 24 hour   Intake 600 ml   Output 3175 ml   Net -2575 ml         Physical Exam Performed:    /80   Pulse 69   Temp 97.8 °F (36.6 °C) (Oral)   Resp 17   Ht 6' 4\" (1.93 m)   Wt 188 lb 15 oz (85.7 kg)   SpO2 98%   BMI 23.00 kg/m²     General appearance: No apparent distress, appears stated age and cooperative. HEENT: Pupils equal, round, and reactive to light. Conjunctivae/corneas clear. Neck: Supple, with full range of motion. No jugular venous distention. Trachea midline. Respiratory:  Normal respiratory effort. Clear to auscultation, bilaterally without Rales/Wheezes/Rhonchi. Cardiovascular: Regular rate and rhythm with normal S1/S2 without murmurs, rubs or gallops. Abdomen: Soft, non-tender, non-distended with normal bowel sounds.   Musculoskeletal: No clubbing, cyanosis or edema bilaterally. Full range of motion without deformity. Skin: Skin color, texture, turgor normal.  No rashes or lesions. Neurologic:  Neurovascularly intact without any focal sensory/motor deficits. Cranial nerves: II-XII intact, grossly non-focal.  Psychiatric: Alert and oriented, thought content appropriate, normal insight  Capillary Refill: Brisk,< 3 seconds   Peripheral Pulses: +2 palpable, equal bilaterally       Labs:   Recent Labs     12/06/22  0658   WBC 6.5   HGB 11.2*   HCT 33.1*        Recent Labs     12/04/22  0534 12/06/22  0657    137   K 3.7 4.3    100   CO2 30 30   BUN 13 16   CREATININE 0.6* 0.7*   CALCIUM 8.7 8.8   PHOS  --  3.1       No results for input(s): AST, ALT, BILIDIR, BILITOT, ALKPHOS in the last 72 hours. No results for input(s): INR in the last 72 hours. No results for input(s): Power Freda in the last 72 hours. Urinalysis:      Lab Results   Component Value Date/Time    NITRU Negative 11/29/2022 10:50 AM    WBCUA  11/29/2022 10:50 AM    BACTERIA 4+ 11/29/2022 10:50 AM    RBCUA 5-10 11/29/2022 10:50 AM    BLOODU SMALL 11/29/2022 10:50 AM    SPECGRAV 1.020 11/29/2022 10:50 AM    GLUCOSEU Negative 11/29/2022 10:50 AM       Consults:    IP CONSULT TO UROLOGY  IP CONSULT TO PHARMACY  IP CONSULT TO UROLOGY  IP CONSULT TO PSYCHIATRY      Assessment/Plan:    Active Hospital Problems    Diagnosis     MIYA (acute kidney injury) ([facility] [address] [N17.9]      Priority: Medium    Diabetic peripheral neuropathy ([facility] [address] [E11.42]      Priority: Medium    Acute combined systolic and diastolic heart failure (HCC) [I50.41]     Alzheimer's disease ([facility] [address] [G30.9, F02.80]     UTI (urinary tract infection) [N39.0]     Essential hypertension [I10]     Mixed hyperlipidemia [E78.2]     BPH with obstruction/lower urinary tract symptoms [N40.1, N13.8]        Enteroccocus UTI. Patient with elevated procalcitonin. Discontinue cefepime and vancomycin. Continue nitrofurantoin. Unstageable sacral decubitus ulcer. Wound care team is following. We will continue frequent turns. MIYA secondary to postobstructive uropathy? .  Patient will continue Valenzuela catheter in place w/ void trial planned 7 Dec.  Urology consulted and appreciated. Creatinine normalized. Encephalopathy - acute metabolic, 2nd to above. Will continue to follow clinical response w/ supportive care PRN. Dementia - w/out behavioral disturbance. Controlled on home medication regimen - continued. Continue supportive care and redirection as needed. HTN - w/out known CAD and no evidence of active signs/sxs of ischemia/failure. Currently controlled on home meds w/ vitals reviewed and documented as above. HyperLipidemia - controlled on home Statin. Continue, w/ f/u and med adjustment w/ PCP    Afib - chronic paroxysmal of unspecified and clinically unable to determine etiology. Normally rate controlled on BBlocker - continued. Anticoagulated at baseline on home Eliquis due to secondary hypercoaguable state due to Afib - continued. Monitored on tele. Type 2 diabetes. Continue insulin sliding scale. Continue basal insulin. Electrolyte abnormalities. We will correct and continue to monitor. Improved. DVT Prophylaxis: Eliquis     Recent Labs     12/06/22  0658        Diet: ADULT DIET; Regular  ADULT ORAL NUTRITION SUPPLEMENT; Breakfast, Dinner; Diabetic Oral Supplement  Code Status: DNR-CCA      PT/OT Eval Status: seen w/ recs for SNF    Dispo - Patient is likely to remain in-house at least until Wed 7 Dec pending clinical course, subspecialty recs and placement decision - Came from [facility].        Nadiya Mcdonald MD

## 2022-12-06 NOTE — PLAN OF CARE
Problem: Discharge Planning  Goal: Discharge to home or other facility with appropriate resources  12/6/2022 0041 by Cha Rivera RN  Outcome: Progressing  12/5/2022 1839 by Lara Holt RN  Outcome: Progressing     Problem: Pain  Goal: Verbalizes/displays adequate comfort level or baseline comfort level  12/6/2022 0041 by Cha Rivera RN  Outcome: Progressing  12/5/2022 1839 by Lara Holt RN  Outcome: Progressing     Problem: Safety - Adult  Goal: Free from fall injury  12/6/2022 0041 by Cha Rivera RN  Outcome: Progressing  12/5/2022 1839 by Lara Holt RN  Outcome: Progressing     Problem: Chronic Conditions and Co-morbidities  Goal: Patient's chronic conditions and co-morbidity symptoms are monitored and maintained or improved  12/6/2022 0041 by Cha Rivera RN  Outcome: Progressing  Flowsheets (Taken 12/5/2022 2304)  Care Plan - Patient's Chronic Conditions and Co-Morbidity Symptoms are Monitored and Maintained or Improved: Monitor and assess patient's chronic conditions and comorbid symptoms for stability, deterioration, or improvement  12/5/2022 1839 by Lara Holt RN  Outcome: Progressing     Problem: Skin/Tissue Integrity  Goal: Absence of new skin breakdown  Description: 1. Monitor for areas of redness and/or skin breakdown  2. Assess vascular access sites hourly  3. Every 4-6 hours minimum:  Change oxygen saturation probe site  4. Every 4-6 hours:  If on nasal continuous positive airway pressure, respiratory therapy assess nares and determine need for appliance change or resting period.   12/6/2022 0041 by Cah Rivera RN  Outcome: Progressing  12/5/2022 1839 by Lara Holt RN  Outcome: Progressing     Problem: Cardiovascular - Adult  Goal: Maintains optimal cardiac output and hemodynamic stability  12/6/2022 0041 by Cha Rivera RN  Outcome: Progressing  12/5/2022 1839 by Lara Holt RN  Outcome: Progressing  Goal: Absence of cardiac dysrhythmias or at baseline  12/6/2022 0041 by Margarita Butler RN  Outcome: Progressing  12/5/2022 1839 by Serene Haney RN  Outcome: Progressing     Problem: Metabolic/Fluid and Electrolytes - Adult  Goal: Electrolytes maintained within normal limits  12/6/2022 0041 by Margarita Butler RN  Outcome: Progressing  12/5/2022 1839 by Serene Haney RN  Outcome: Progressing  Goal: Hemodynamic stability and optimal renal function maintained  12/6/2022 0041 by Margarita Butler RN  Outcome: Progressing  12/5/2022 1839 by Serene Haney RN  Outcome: Progressing  Goal: Glucose maintained within prescribed range  12/6/2022 0041 by Margarita Butler RN  Outcome: Progressing  12/5/2022 1839 by Serene Haney RN  Outcome: Progressing     Problem: Hematologic - Adult  Goal: Maintains hematologic stability  12/6/2022 0041 by Margarita Butler RN  Outcome: Progressing  Flowsheets (Taken 12/5/2022 2307)  Maintains hematologic stability: Assess for signs and symptoms of bleeding or hemorrhage  12/5/2022 1839 by Serene Haney RN  Outcome: Progressing     Problem: Nutrition Deficit:  Goal: Optimize nutritional status  12/6/2022 0041 by Margarita Butler RN  Outcome: Progressing  12/5/2022 1839 by Serene Haney RN  Outcome: Progressing     Problem: Neurosensory - Adult  Goal: Achieves stable or improved neurological status  12/6/2022 0041 by Margarita Butler RN  Outcome: Progressing  12/5/2022 1839 by Serene Haney RN  Outcome: Progressing     Problem: Skin/Tissue Integrity - Adult  Goal: Skin integrity remains intact  12/6/2022 0041 by Margarita Butler RN  Outcome: Progressing  12/5/2022 1839 by Serene Haney RN  Outcome: Progressing     Problem: Genitourinary - Adult  Goal: Absence of urinary retention  12/6/2022 0041 by Margarita Butler RN  Outcome: Progressing  12/5/2022 1839 by Serene Haney RN  Outcome: Progressing  Goal: Urinary catheter remains patent  12/6/2022 0041 by Margarita Butler RN  Outcome: Progressing  Flowsheets (Taken 12/5/2022 2307)  Urinary catheter remains patent: Assess patency of urinary catheter  12/5/2022 1839 by Kylee Prado RN  Outcome: Progressing     Problem: Confusion  Goal: Confusion, delirium, dementia, or psychosis is improved or at baseline  Description: INTERVENTIONS:  1. Assess for possible contributors to thought disturbance, including medications, impaired vision or hearing, underlying metabolic abnormalities, dehydration, psychiatric diagnoses, and notify attending LIP  2. [place] high risk fall precautions, as indicated  3. Provide frequent short contacts to provide reality reorientation, refocusing and direction  4. Decrease environmental stimuli, including noise as appropriate  5. Monitor and intervene to maintain adequate nutrition, hydration, elimination, sleep and activity  6. If unable to ensure safety without constant attention obtain sitter and review sitter guidelines with assigned personnel  7.  Initiate Psychosocial CNS and Spiritual Care consult, as indicated  12/6/2022 0041 by Tanesha Campbell RN  Outcome: Progressing  12/5/2022 1839 by Kylee Prado RN  Outcome: Progressing     Problem: Musculoskeletal - Adult  Goal: Return mobility to safest level of function  12/6/2022 0041 by Tanesha Campbell RN  Outcome: Progressing  12/5/2022 1839 by Kylee Prado RN  Outcome: Progressing  Goal: Return ADL status to a safe level of function  12/6/2022 0041 by Tanesha Campbell RN  Outcome: Progressing  12/5/2022 1839 by Kylee Prado RN  Outcome: Progressing

## 2022-12-07 LAB
ALBUMIN SERPL-MCNC: 3.4 G/DL (ref 3.4–5)
ANION GAP SERPL CALCULATED.3IONS-SCNC: 6 MMOL/L (ref 3–16)
BUN BLDV-MCNC: 20 MG/DL (ref 7–20)
CALCIUM SERPL-MCNC: 9.2 MG/DL (ref 8.3–10.6)
CHLORIDE BLD-SCNC: 98 MMOL/L (ref 99–110)
CO2: 33 MMOL/L (ref 21–32)
CREAT SERPL-MCNC: 0.8 MG/DL (ref 0.8–1.3)
GFR SERPL CREATININE-BSD FRML MDRD: >60 ML/MIN/{1.73_M2}
GLUCOSE BLD-MCNC: 169 MG/DL (ref 70–99)
GLUCOSE BLD-MCNC: 179 MG/DL (ref 70–99)
GLUCOSE BLD-MCNC: 191 MG/DL (ref 70–99)
GLUCOSE BLD-MCNC: 231 MG/DL (ref 70–99)
GLUCOSE BLD-MCNC: 253 MG/DL (ref 70–99)
HCT VFR BLD CALC: 32.5 % (ref 40.5–52.5)
HEMOGLOBIN: 10.9 G/DL (ref 13.5–17.5)
MCH RBC QN AUTO: 29.2 PG (ref 26–34)
MCHC RBC AUTO-ENTMCNC: 33.6 G/DL (ref 31–36)
MCV RBC AUTO: 86.9 FL (ref 80–100)
PDW BLD-RTO: 14.3 % (ref 12.4–15.4)
PERFORMED ON: ABNORMAL
PHOSPHORUS: 3.9 MG/DL (ref 2.5–4.9)
PLATELET # BLD: 400 K/UL (ref 135–450)
PMV BLD AUTO: 7.2 FL (ref 5–10.5)
POTASSIUM SERPL-SCNC: 4.5 MMOL/L (ref 3.5–5.1)
RBC # BLD: 3.75 M/UL (ref 4.2–5.9)
SODIUM BLD-SCNC: 137 MMOL/L (ref 136–145)
WBC # BLD: 6.8 K/UL (ref 4–11)

## 2022-12-07 PROCEDURE — 85027 COMPLETE CBC AUTOMATED: CPT

## 2022-12-07 PROCEDURE — 2580000003 HC RX 258: Performed by: NURSE PRACTITIONER

## 2022-12-07 PROCEDURE — 6360000002 HC RX W HCPCS: Performed by: INTERNAL MEDICINE

## 2022-12-07 PROCEDURE — 36415 COLL VENOUS BLD VENIPUNCTURE: CPT

## 2022-12-07 PROCEDURE — 51798 US URINE CAPACITY MEASURE: CPT

## 2022-12-07 PROCEDURE — 6370000000 HC RX 637 (ALT 250 FOR IP): Performed by: PSYCHIATRY & NEUROLOGY

## 2022-12-07 PROCEDURE — 1200000000 HC SEMI PRIVATE

## 2022-12-07 PROCEDURE — 6370000000 HC RX 637 (ALT 250 FOR IP): Performed by: NURSE PRACTITIONER

## 2022-12-07 PROCEDURE — 6370000000 HC RX 637 (ALT 250 FOR IP): Performed by: INTERNAL MEDICINE

## 2022-12-07 PROCEDURE — 80069 RENAL FUNCTION PANEL: CPT

## 2022-12-07 RX ADMIN — SERTRALINE HYDROCHLORIDE 25 MG: 50 TABLET ORAL at 09:07

## 2022-12-07 RX ADMIN — ACETAMINOPHEN 650 MG: 325 TABLET ORAL at 15:51

## 2022-12-07 RX ADMIN — NITROFURANTOIN MONOHYDRATE/MACROCRYSTALS 100 MG: 75; 25 CAPSULE ORAL at 09:07

## 2022-12-07 RX ADMIN — DIVALPROEX SODIUM 125 MG: 125 CAPSULE, COATED PELLETS ORAL at 09:07

## 2022-12-07 RX ADMIN — SODIUM CHLORIDE, PRESERVATIVE FREE 10 ML: 5 INJECTION INTRAVENOUS at 09:07

## 2022-12-07 RX ADMIN — TAMSULOSIN HYDROCHLORIDE 0.4 MG: 0.4 CAPSULE ORAL at 09:07

## 2022-12-07 RX ADMIN — APIXABAN 5 MG: 5 TABLET, FILM COATED ORAL at 20:37

## 2022-12-07 RX ADMIN — QUETIAPINE FUMARATE 50 MG: 25 TABLET ORAL at 17:54

## 2022-12-07 RX ADMIN — ATORVASTATIN CALCIUM 10 MG: 10 TABLET, FILM COATED ORAL at 20:37

## 2022-12-07 RX ADMIN — APIXABAN 5 MG: 5 TABLET, FILM COATED ORAL at 09:07

## 2022-12-07 RX ADMIN — ZIPRASIDONE MESYLATE 10 MG: 20 INJECTION, POWDER, LYOPHILIZED, FOR SOLUTION INTRAMUSCULAR at 19:09

## 2022-12-07 RX ADMIN — INSULIN GLARGINE 5 UNITS: 100 INJECTION, SOLUTION SUBCUTANEOUS at 20:38

## 2022-12-07 RX ADMIN — SODIUM CHLORIDE, PRESERVATIVE FREE 10 ML: 5 INJECTION INTRAVENOUS at 20:37

## 2022-12-07 RX ADMIN — INSULIN LISPRO 2 UNITS: 100 INJECTION, SOLUTION INTRAVENOUS; SUBCUTANEOUS at 12:42

## 2022-12-07 RX ADMIN — DIVALPROEX SODIUM 125 MG: 125 CAPSULE, COATED PELLETS ORAL at 20:37

## 2022-12-07 RX ADMIN — QUETIAPINE FUMARATE 12.5 MG: 25 TABLET ORAL at 09:07

## 2022-12-07 RX ADMIN — COLLAGENASE SANTYL: 250 OINTMENT TOPICAL at 09:08

## 2022-12-07 ASSESSMENT — PAIN SCALES - PAIN ASSESSMENT IN ADVANCED DEMENTIA (PAINAD)
BREATHING: 0
BREATHING: 0
BODYLANGUAGE: 1
TOTALSCORE: 2
TOTALSCORE: 2
NEGVOCALIZATION: 0
NEGVOCALIZATION: 0
FACIALEXPRESSION: 0
TOTALSCORE: 2
NEGVOCALIZATION: 0
BREATHING: 0
CONSOLABILITY: 1
FACIALEXPRESSION: 0
CONSOLABILITY: 1
BODYLANGUAGE: 1
BREATHING: 0
FACIALEXPRESSION: 0
TOTALSCORE: 2
CONSOLABILITY: 1
NEGVOCALIZATION: 0
CONSOLABILITY: 1
FACIALEXPRESSION: 0
BODYLANGUAGE: 1
BODYLANGUAGE: 1

## 2022-12-07 ASSESSMENT — PAIN SCALES - WONG BAKER

## 2022-12-07 NOTE — PLAN OF CARE
Problem: Discharge Planning  Goal: Discharge to home or other facility with appropriate resources  Outcome: Progressing     Problem: Pain  Goal: Verbalizes/displays adequate comfort level or baseline comfort level  Outcome: Progressing     Problem: Safety - Adult  Goal: Free from fall injury  Outcome: Progressing     Problem: Chronic Conditions and Co-morbidities  Goal: Patient's chronic conditions and co-morbidity symptoms are monitored and maintained or improved  Outcome: Progressing     Problem: Skin/Tissue Integrity  Goal: Absence of new skin breakdown  Description: 1. Monitor for areas of redness and/or skin breakdown  2. Assess vascular access sites hourly  3. Every 4-6 hours minimum:  Change oxygen saturation probe site  4. Every 4-6 hours:  If on nasal continuous positive airway pressure, respiratory therapy assess nares and determine need for appliance change or resting period.   Outcome: Progressing     Problem: Cardiovascular - Adult  Goal: Maintains optimal cardiac output and hemodynamic stability  Outcome: Progressing  Goal: Absence of cardiac dysrhythmias or at baseline  Outcome: Progressing     Problem: Metabolic/Fluid and Electrolytes - Adult  Goal: Electrolytes maintained within normal limits  Outcome: Progressing  Goal: Hemodynamic stability and optimal renal function maintained  Outcome: Progressing  Goal: Glucose maintained within prescribed range  Outcome: Progressing     Problem: Hematologic - Adult  Goal: Maintains hematologic stability  Outcome: Progressing     Problem: Nutrition Deficit:  Goal: Optimize nutritional status  Outcome: Progressing     Problem: Neurosensory - Adult  Goal: Achieves stable or improved neurological status  Outcome: Progressing     Problem: Skin/Tissue Integrity - Adult  Goal: Skin integrity remains intact  Outcome: Progressing     Problem: Genitourinary - Adult  Goal: Absence of urinary retention  Outcome: Progressing  Goal: Urinary catheter remains patent  Outcome: Progressing     Problem: Confusion  Goal: Confusion, delirium, dementia, or psychosis is improved or at baseline  Description: INTERVENTIONS:  1. Assess for possible contributors to thought disturbance, including medications, impaired vision or hearing, underlying metabolic abnormalities, dehydration, psychiatric diagnoses, and notify attending LIP  2. [place] high risk fall precautions, as indicated  3. Provide frequent short contacts to provide reality reorientation, refocusing and direction  4. Decrease environmental stimuli, including noise as appropriate  5. Monitor and intervene to maintain adequate nutrition, hydration, elimination, sleep and activity  6. If unable to ensure safety without constant attention obtain sitter and review sitter guidelines with assigned personnel  7.  Initiate Psychosocial CNS and Spiritual Care consult, as indicated  Outcome: Progressing     Problem: Musculoskeletal - Adult  Goal: Return mobility to safest level of function  Outcome: Progressing  Goal: Return ADL status to a safe level of function  Outcome: Progressing

## 2022-12-07 NOTE — PLAN OF CARE
1640: Patient Avasys alarm going off. Patient agitated and attempting to repeatedly get out of bed. Patient able to be redirected after 10 minutes to lay back in bed. 1800: Patient continues to remain calm and pleasant lying in bed. Will continue to monitor.

## 2022-12-07 NOTE — PROGRESS NOTES
PT not redirectable Md and pt spouse updated, pt given Geodon and placed in 3 point restraints goal to trial off tonight restraints tonight if pt unable remain safe and leave uronology placed nickerson and IV line in place.

## 2022-12-07 NOTE — CARE COORDINATION
Chart reviewed. AMS, falls, MIYA, UTI. Management per urology, psych and IM. PO antimicrobial. Pt lives at [facility]. [address] unable to accept pt back with nickerson catheter, also unable to accept pt back due to wound on coccyx. Nickerson catheter removed this morning for void trial. Pt has been sitter-free since yesterday evening. Had an episode of confusion, was unable to re-direct over night. Was given a dose of Geodon and placed in restraints. Restraints were removed at 2AM. Pt has AVASYS monitoring. Has had no other episodes since last night. CM spoke with Karla at [facility]. They are able to accept pt at discharge. AVASYS should not be barrier, as long as pt not requiring frequent re-direction. CM spoke with pt's wife, Job Osorio. She is agreeable to [facility] for skilled stay. May be interested in having pt transition into [facility] memory care unit after skilled stay. CM will continue to follow.  Karen Turk RN

## 2022-12-07 NOTE — PROGRESS NOTES
Hospitalist Progress Note      PCP: Sarahi Porter    Date of Admission: 11/28/2022    Chief Complaint: Confusion, urinary retention    Subjective: No new c/o. Medications:  Reviewed    Infusion Medications    sodium chloride 25 mL/hr at 12/03/22 1559    dextrose       Scheduled Medications    tamsulosin  0.4 mg Oral Daily    nitrofurantoin (macrocrystal-monohydrate)  100 mg Oral 2 times per day    collagenase   Topical Daily    QUEtiapine  12.5 mg Oral Daily    QUEtiapine  50 mg Oral Dinner    atorvastatin  10 mg Oral Nightly    insulin glargine  5 Units SubCUTAneous Nightly    sodium chloride flush  5-40 mL IntraVENous 2 times per day    insulin lispro  0-8 Units SubCUTAneous TID WC    insulin lispro  0-4 Units SubCUTAneous Nightly    apixaban  5 mg Oral BID    sertraline  25 mg Oral Daily    divalproex  125 mg Oral 2 times per day     PRN Meds: sodium chloride flush, sodium chloride, ondansetron **OR** ondansetron, acetaminophen **OR** acetaminophen, glucose, dextrose bolus **OR** dextrose bolus, glucagon (rDNA), dextrose, ziprasidone      Intake/Output Summary (Last 24 hours) at 12/7/2022 0906  Last data filed at 12/7/2022 0030  Gross per 24 hour   Intake 360 ml   Output 1975 ml   Net -1615 ml         Physical Exam Performed:    BP (!) 111/59   Pulse 63   Temp 97.8 °F (36.6 °C) (Oral)   Resp 16   Ht 6' 4\" (1.93 m)   Wt 185 lb 10 oz (84.2 kg)   SpO2 97%   BMI 22.60 kg/m²     General appearance: No apparent distress, appears stated age and demented. HEENT: Pupils equal, round, and reactive to light. Conjunctivae/corneas clear. Neck: Supple, with full range of motion. No jugular venous distention. Trachea midline. Respiratory:  Normal respiratory effort. Clear to auscultation, bilaterally without Rales/Wheezes/Rhonchi. Cardiovascular: Regular rate and rhythm with normal S1/S2 without murmurs, rubs or gallops.   Abdomen: Soft, non-tender, non-distended with normal bowel sounds. Musculoskeletal: No clubbing, cyanosis or edema bilaterally. Skin: Skin color, texture, turgor normal.  No rashes or lesions. Neurologic:  Neurovascularly intact without any focal sensory/motor deficits. Cranial nerves: II-XII intact, grossly non-focal.  Psychiatric: Alert and oriented to self only  Capillary Refill: Brisk,< 3 seconds   Peripheral Pulses: +2 palpable, equal bilaterally       Labs:   Recent Labs     12/06/22  0658 12/07/22  0706   WBC 6.5 6.8   HGB 11.2* 10.9*   HCT 33.1* 32.5*    400       Recent Labs     12/06/22  0657 12/07/22  0706    137   K 4.3 4.5    98*   CO2 30 33*   BUN 16 20   CREATININE 0.7* 0.8   CALCIUM 8.8 9.2   PHOS 3.1 3.9       No results for input(s): AST, ALT, BILIDIR, BILITOT, ALKPHOS in the last 72 hours. No results for input(s): INR in the last 72 hours. No results for input(s): Ilene Michelle in the last 72 hours. Urinalysis:      Lab Results   Component Value Date/Time    NITRU Negative 11/29/2022 10:50 AM    WBCUA  11/29/2022 10:50 AM    BACTERIA 4+ 11/29/2022 10:50 AM    RBCUA 5-10 11/29/2022 10:50 AM    BLOODU SMALL 11/29/2022 10:50 AM    SPECGRAV 1.020 11/29/2022 10:50 AM    GLUCOSEU Negative 11/29/2022 10:50 AM       Consults:    IP CONSULT TO UROLOGY  IP CONSULT TO PHARMACY  IP CONSULT TO UROLOGY  IP CONSULT TO PSYCHIATRY      Assessment/Plan:    Active Hospital Problems    Diagnosis     MIYA (acute kidney injury) ([facility] [address] [N17.9]      Priority: Medium    Diabetic peripheral neuropathy ([facility] [address] [E11.42]      Priority: Medium    Acute combined systolic and diastolic heart failure (HCC) [I50.41]     Alzheimer's disease ([facility] [address] [G30.9, F02.80]     UTI (urinary tract infection) [N39.0]     Essential hypertension [I10]     Mixed hyperlipidemia [E78.2]     BPH with obstruction/lower urinary tract symptoms [N40.1, N13.8]        Enteroccocus UTI - Patient with elevated procalcitonin. Discontinued Cefepime and Vancomycin. Continue Macrobid. Unstageable sacral decubitus ulcer - Wound care team is following. Continue frequent turns. MIYA - secondary to postobstructive uropathy. Patient will continue Valenzuela catheter in place w/ void trial in process 7 Dec.  Urology consulted and appreciated. Creatinine normalized. Encephalopathy - acute metabolic, 2nd to above. Will continue to follow clinical response w/ supportive care PRN. Dementia - w/out behavioral disturbance. Controlled on home medication regimen - continued. Continue supportive care and redirection as needed. HTN - w/out known CAD and no evidence of active signs/sxs of ischemia/failure. Currently controlled on home meds w/ vitals reviewed and documented as above. HyperLipidemia - controlled on home Statin. Continue, w/ f/u and med adjustment w/ PCP    Afib - chronic paroxysmal of unspecified and clinically unable to determine etiology. Normally rate controlled on BBlocker - continued. Anticoagulated at baseline on home Eliquis due to secondary hypercoaguable state due to Afib - continued. Monitored on tele. DM2 - controlled on home oral antiGlycemics/Insulin - held/continued at reduced dose. Follow FSBS/SSI medium regimen. Last HbA1c 7.1% dated Nov 2022. Anticipate resuming/continuing home regimen at discharge. DVT Prophylaxis: Eliquis     Recent Labs     12/06/22  0658 12/07/22  0706    400       Diet: ADULT DIET; Regular  ADULT ORAL NUTRITION SUPPLEMENT; Breakfast, Dinner; Diabetic Oral Supplement  Code Status: DNR-CCA      PT/OT Eval Status: seen w/ recs for SNF    Dispo - Patient is likely to remain in-house at least until Thurs/Friday 8/9 Dec pending clinical course, subspecialty recs and placement decision - Came from [place] but tentatively going to [place].        Francis Rangel MD

## 2022-12-07 NOTE — PROGRESS NOTES
Comprehensive Nutrition Assessment    Type and Reason for Visit:  Reassess    Nutrition Recommendations/Plan:   Recommend carb control diet - BG elevated   Continue Glucerna BID   Consider increasing bowel regimen - MD to advise   Monitor nutrition adequacy, pertinent labs, bowel habits, wt changes, and clinical progress     Malnutrition Assessment:  Malnutrition Status: At risk for malnutrition (Comment) (12/07/22 6516)    Context:  Acute Illness       Nutrition Assessment:    Follow up: Pt nutritionally improving AEB continued good PO intakes and consuming ONS. Required restraints overnight d/t increased confusion. Weights remain stable this admission. Recommend continue current ONS for wound healing. Will continue to monitor. Nutrition Related Findings:    Active BS. Last BM 11/29. -271 past 24 hrs. Wound Type: Pressure Injury, Stage II, Skin Tears       Current Nutrition Intake & Therapies:    Average Meal Intake: 51-75%, %  Average Supplements Intake: 1-25%, 51-75%, %  ADULT DIET; Regular  ADULT ORAL NUTRITION SUPPLEMENT; Breakfast, Dinner; Diabetic Oral Supplement    Anthropometric Measures:  Height: 6' 4\" (193 cm)  Ideal Body Weight (IBW): 202 lbs (92 kg)    Admission Body Weight: 183 lb (83 kg) (bed scale)  Current Body Weight: 185 lb (83.9 kg), 90.1 % IBW.  Weight Source: Bed Scale  Current BMI (kg/m2): 22.5  Usual Body Weight:  (stated weight hx)                       BMI Categories: Normal Weight (BMI 22.0 to 24.9) age over 72    Estimated Daily Nutrient Needs:  Energy Requirements Based On: Kcal/kg (25-30)  Weight Used for Energy Requirements: Current  Energy (kcal/day): 6810-6062 kcal  Weight Used for Protein Requirements: Current (1.2-1.4 g/kg)  Protein (g/day):  g  Method Used for Fluid Requirements: 1 ml/kcal  Fluid (ml/day): 1831-1641 mL    Nutrition Diagnosis:   Increased nutrient needs related to increase demand for energy/nutrients as evidenced by wounds    Nutrition Interventions:   Food and/or Nutrient Delivery: Modify Current Diet, Continue Oral Nutrition Supplement  Nutrition Education/Counseling: No recommendation at this time, Education not appropriate  Coordination of Nutrition Care: Continue to monitor while inpatient       Goals:  Previous Goal Met: Progressing toward Goal(s)  Goals: PO intake 50% or greater, prior to discharge       Nutrition Monitoring and Evaluation:   Behavioral-Environmental Outcomes: None Identified  Food/Nutrient Intake Outcomes: Food and Nutrient Intake, Supplement Intake  Physical Signs/Symptoms Outcomes: Biochemical Data, Nutrition Focused Physical Findings, Skin, Weight    Discharge Planning:    Continue current diet, Continue Oral Nutrition Supplement     Adán Weaver, MS, RD, LD  Contact: [phone]

## 2022-12-08 LAB
ALBUMIN SERPL-MCNC: 3.5 G/DL (ref 3.4–5)
ANION GAP SERPL CALCULATED.3IONS-SCNC: 9 MMOL/L (ref 3–16)
BUN BLDV-MCNC: 29 MG/DL (ref 7–20)
CALCIUM SERPL-MCNC: 9 MG/DL (ref 8.3–10.6)
CHLORIDE BLD-SCNC: 99 MMOL/L (ref 99–110)
CO2: 30 MMOL/L (ref 21–32)
CREAT SERPL-MCNC: 0.9 MG/DL (ref 0.8–1.3)
GFR SERPL CREATININE-BSD FRML MDRD: >60 ML/MIN/{1.73_M2}
GLUCOSE BLD-MCNC: 199 MG/DL (ref 70–99)
GLUCOSE BLD-MCNC: 200 MG/DL (ref 70–99)
GLUCOSE BLD-MCNC: 201 MG/DL (ref 70–99)
GLUCOSE BLD-MCNC: 229 MG/DL (ref 70–99)
GLUCOSE BLD-MCNC: 298 MG/DL (ref 70–99)
HCT VFR BLD CALC: 34.2 % (ref 40.5–52.5)
HEMOGLOBIN: 11.6 G/DL (ref 13.5–17.5)
MCH RBC QN AUTO: 29.4 PG (ref 26–34)
MCHC RBC AUTO-ENTMCNC: 33.9 G/DL (ref 31–36)
MCV RBC AUTO: 86.9 FL (ref 80–100)
PDW BLD-RTO: 14.7 % (ref 12.4–15.4)
PERFORMED ON: ABNORMAL
PHOSPHORUS: 3.4 MG/DL (ref 2.5–4.9)
PLATELET # BLD: 403 K/UL (ref 135–450)
PMV BLD AUTO: 7.4 FL (ref 5–10.5)
POTASSIUM SERPL-SCNC: 4.5 MMOL/L (ref 3.5–5.1)
PRO-BNP: 249 PG/ML (ref 0–124)
RBC # BLD: 3.94 M/UL (ref 4.2–5.9)
SODIUM BLD-SCNC: 138 MMOL/L (ref 136–145)
WBC # BLD: 7.3 K/UL (ref 4–11)

## 2022-12-08 PROCEDURE — 97110 THERAPEUTIC EXERCISES: CPT

## 2022-12-08 PROCEDURE — 1200000000 HC SEMI PRIVATE

## 2022-12-08 PROCEDURE — 97116 GAIT TRAINING THERAPY: CPT

## 2022-12-08 PROCEDURE — 6370000000 HC RX 637 (ALT 250 FOR IP): Performed by: INTERNAL MEDICINE

## 2022-12-08 PROCEDURE — 2580000003 HC RX 258: Performed by: NURSE PRACTITIONER

## 2022-12-08 PROCEDURE — 6370000000 HC RX 637 (ALT 250 FOR IP): Performed by: PSYCHIATRY & NEUROLOGY

## 2022-12-08 PROCEDURE — 85027 COMPLETE CBC AUTOMATED: CPT

## 2022-12-08 PROCEDURE — 83880 ASSAY OF NATRIURETIC PEPTIDE: CPT

## 2022-12-08 PROCEDURE — 80069 RENAL FUNCTION PANEL: CPT

## 2022-12-08 PROCEDURE — 97535 SELF CARE MNGMENT TRAINING: CPT

## 2022-12-08 PROCEDURE — 6370000000 HC RX 637 (ALT 250 FOR IP): Performed by: NURSE PRACTITIONER

## 2022-12-08 PROCEDURE — 6370000000 HC RX 637 (ALT 250 FOR IP): Performed by: FAMILY MEDICINE

## 2022-12-08 PROCEDURE — 51798 US URINE CAPACITY MEASURE: CPT

## 2022-12-08 PROCEDURE — 97530 THERAPEUTIC ACTIVITIES: CPT

## 2022-12-08 PROCEDURE — 36415 COLL VENOUS BLD VENIPUNCTURE: CPT

## 2022-12-08 RX ORDER — HALOPERIDOL 1 MG/1
0.5 TABLET ORAL EVERY 6 HOURS PRN
Status: DISCONTINUED | OUTPATIENT
Start: 2022-12-08 | End: 2022-12-09 | Stop reason: HOSPADM

## 2022-12-08 RX ORDER — TAMSULOSIN HYDROCHLORIDE 0.4 MG/1
0.4 CAPSULE ORAL DAILY
Qty: 30 CAPSULE | Refills: 3 | Status: SHIPPED | OUTPATIENT
Start: 2022-12-09

## 2022-12-08 RX ADMIN — SODIUM CHLORIDE, PRESERVATIVE FREE 10 ML: 5 INJECTION INTRAVENOUS at 21:28

## 2022-12-08 RX ADMIN — DIVALPROEX SODIUM 125 MG: 125 CAPSULE, COATED PELLETS ORAL at 10:18

## 2022-12-08 RX ADMIN — APIXABAN 5 MG: 5 TABLET, FILM COATED ORAL at 21:28

## 2022-12-08 RX ADMIN — TAMSULOSIN HYDROCHLORIDE 0.4 MG: 0.4 CAPSULE ORAL at 10:18

## 2022-12-08 RX ADMIN — SERTRALINE HYDROCHLORIDE 25 MG: 50 TABLET ORAL at 10:19

## 2022-12-08 RX ADMIN — DIVALPROEX SODIUM 125 MG: 125 CAPSULE, COATED PELLETS ORAL at 21:28

## 2022-12-08 RX ADMIN — COLLAGENASE SANTYL: 250 OINTMENT TOPICAL at 10:19

## 2022-12-08 RX ADMIN — APIXABAN 5 MG: 5 TABLET, FILM COATED ORAL at 10:18

## 2022-12-08 RX ADMIN — QUETIAPINE FUMARATE 50 MG: 25 TABLET ORAL at 16:30

## 2022-12-08 RX ADMIN — INSULIN GLARGINE 5 UNITS: 100 INJECTION, SOLUTION SUBCUTANEOUS at 21:28

## 2022-12-08 RX ADMIN — HALOPERIDOL 0.5 MG: 1 TABLET ORAL at 18:00

## 2022-12-08 RX ADMIN — INSULIN LISPRO 2 UNITS: 100 INJECTION, SOLUTION INTRAVENOUS; SUBCUTANEOUS at 12:55

## 2022-12-08 RX ADMIN — QUETIAPINE FUMARATE 12.5 MG: 25 TABLET ORAL at 10:18

## 2022-12-08 RX ADMIN — SODIUM CHLORIDE, PRESERVATIVE FREE 10 ML: 5 INJECTION INTRAVENOUS at 10:19

## 2022-12-08 RX ADMIN — ATORVASTATIN CALCIUM 10 MG: 10 TABLET, FILM COATED ORAL at 21:28

## 2022-12-08 NOTE — DISCHARGE INSTR - COC
Continuity of Care Form    Patient Name: Jenny Read   :  1950  MRN:  [id number]    Admit date:  2022  Discharge date:  22    Code Status Order: DNR-CCA   Advance Directives:     Admitting Physician:  No admitting provider for patient encounter.   PCP: Batsheva Patino    Discharging Nurse: [address] Unit/Room#: 2023/6343-94  Discharging Unit Phone Number: [phone]    Emergency Contact:   Extended Emergency Contact Information  Primary Emergency Contact: [facility]  Address: [address] [place] [address], [address] Phone: [phone]  Mobile Phone: [phone]  Relation: Spouse  Secondary Emergency Contact: Demarco Atkinson  Home Phone: [phone]  Relation: Child    Past Surgical History:  Past Surgical History:   Procedure Laterality Date    CATARACT REMOVAL  19 (L) 19 (R)    COLONOSCOPY      ENDOSCOPY, COLON, DIAGNOSTIC      FOOT SURGERY Right     Ulcer surgery    HYPOSPADIAS CORRECTION      OTHER SURGICAL HISTORY Left 2016    INCISION AND DRAINAGE LEFT HALLUX         PILONIDAL CYST EXCISION      TONSILLECTOMY      UPPER GASTROINTESTINAL ENDOSCOPY  2017    Dr Dawood Manzano       Immunization History:   Immunization History   Administered Date(s) Administered    COVID-19, MODERNA BLUE border, Primary or Immunocompromised, (age 12y+), IM, 100 mcg/0.5mL 2021, 2021    Influenza 2013    Influenza Vaccine, unspecified formulation 2013, 11/15/2014, 2016    Influenza, High Dose (Fluzone 65 yrs and older) 2015, 2016, 2017, 2018, 2020    Influenza, Triv, inactivated, subunit, adjuvanted, IM (Fluad 65 yrs and older) 10/23/2019    Pneumococcal Conjugate 13-valent (Frchpoq99) 2015    Pneumococcal Polysaccharide ([id number]) 2017    Tdap (Boostrix, Adacel) 2020       Active Problems:  Patient Active Problem List   Diagnosis Code    BPH with obstruction/lower urinary tract symptoms N40.1, N13.8    ED (erectile dysfunction) N52.9    Cerebellar infarct I63.9    Diabetic nephropathy with proteinuria ([facility]) E11.21    Diabetic peripheral neuropathy ([facility]) E11.42    Essential hypertension I10    Controlled type 2 diabetes mellitus with complication, with long-term current use of insulin ([facility]) E11.8, Z79.4    Mixed hyperlipidemia E78.2    UTI (urinary tract infection) N39.0    SVT (supraventricular tachycardia) ([facility]) I47.1    Alzheimer's disease ([facility]) G30.9, F02.80    Acute combined systolic and diastolic heart failure ([facility]) I50.41    Persistent depressive disorder with anxious distress, currently moderate F34.1    Anxiety F41.9    MIYA (acute kidney injury) ([facility] [address] N17.9       Isolation/Infection:   Isolation            No Isolation          Patient Infection Status       None to display            Nurse Assessment:  Last Vital Signs: BP (!) 100/51   Pulse 85   Temp 98.1 °F (36.7 °C) (Oral)   Resp 18   Ht 6' 4\" (1.93 m)   Wt 184 lb 3.5 oz (83.6 kg)   SpO2 99%   BMI 22.42 kg/m²     Last documented pain score (0-10 scale): Pain Level: 0  Last Weight:   Wt Readings from Last 1 Encounters:   12/08/22 184 lb 3.5 oz (83.6 kg)     Mental Status:  disoriented and alert    IV Access:  - None    Nursing Mobility/ADLs:  Walking   Assisted  Transfer  Assisted  Bathing  Assisted  Dressing  Assisted  Toileting  Assisted  Feeding  Assisted  Med Admin  Assisted  Med Delivery   none    Wound Care Documentation and Therapy:  Wound 11/29/22 Head Posterior (Active)   Wound Image   11/30/22 0953   Wound Etiology Skin Tear 12/08/22 1015   Wound Cleansed Not Cleansed 12/07/22 0806   Dressing/Treatment Open to air 12/08/22 1015   Wound Length (cm) 1 cm 11/30/22 0953   Wound Width (cm) 1.5 cm 11/30/22 0953   Wound Depth (cm) 0 cm 11/30/22 0953   Wound Surface Area (cm^2) 1.5 cm^2 11/30/22 0953   Wound Volume (cm^3) 0 cm^3 11/30/22 0953   Distance Tunneling (cm) 0 cm 12/07/22 4814   Tunneling Position ___ O'Clock 0 12/07/22 0806   Undermining Starts ___ O'Clock 0 12/07/22 0806   Undermining Ends___ O'Clock 0 12/07/22 0806   Undermining Maxium Distance (cm) 0 12/07/22 0806   Wound Assessment Dry 12/07/22 0806   Drainage Amount None 12/07/22 0806   Odor None 12/07/22 0806   Angie-wound Assessment Dry/flaky; Intact 12/07/22 0806   Margins Attached edges; Defined edges 12/07/22 0806   Number of days: 9       Wound 11/30/22 Coccyx Mid slough 100% (Active)   Wound Image   11/30/22 0953   Wound Etiology Pressure Stage 2 12/07/22 0806   Dressing Status Clean;Dry; Intact 12/08/22 1015   Wound Cleansed Not Cleansed 12/07/22 2474   Dressing/Treatment Pharmaceutical agent (see MAR); Dry dressing 12/08/22 1015   Offloading for Diabetic Foot Ulcers Offloading ordered 12/07/22 0806   Dressing Change Due 12/07/22 12/07/22 0806   Wound Length (cm) 4 cm 11/30/22 0953   Wound Width (cm) 1.2 cm 11/30/22 0953   Wound Depth (cm) 0.1 cm 11/30/22 0953   Wound Surface Area (cm^2) 4.8 cm^2 11/30/22 0953   Wound Volume (cm^3) 0.48 cm^3 11/30/22 0953   Distance Tunneling (cm) 0 cm 12/07/22 0806   Tunneling Position ___ O'Clock 0 12/07/22 0806   Undermining Starts ___ O'Clock 0 12/07/22 0806   Undermining Ends___ O'Clock 0 12/07/22 0806   Undermining Maxium Distance (cm) 0 12/07/22 0806   Wound Assessment Slough 12/07/22 0806   Drainage Amount Scant 12/07/22 0806   Drainage Description Serous 12/07/22 0806   Odor None 12/07/22 0806   Angie-wound Assessment Blanchable erythema 12/07/22 0806   Margins Attached edges; Defined edges 12/07/22 0806   Wound Thickness Description not for Pressure Injury Partial thickness 12/07/22 0806   Number of days: 8       Wound 12/04/22 Tibial Right (Active)   Wound Etiology Skin Tear 12/07/22 0806   Dressing Status Clean; Intact;Dry 12/08/22 1111   Wound Cleansed Not Cleansed 12/07/22 0806   Dressing/Treatment Dry dressing 12/08/22 1111   Number of days: 3        Elimination:  Continence: Bowel: No  Bladder: n/a  Urinary Catheter: Yes  Colostomy/Ileostomy/Ileal Conduit: No         Intake/Output Summary (Last 24 hours) at 12/8/2022 1307  Last data filed at 12/8/2022 0923  Gross per 24 hour   Intake 730 ml   Output 275 ml   Net 455 ml     I/O last 3 completed shifts: In: 36 [P.O.:720; I.V.:10]  Out: 1550 [Urine:1550]    Safety Concerns: At Risk for Falls    Impairments/Disabilities:      None and Hearing    Nutrition Therapy:  Current Nutrition Therapy:   - Oral Diet:  General    Routes of Feeding: Oral  Liquids: No Restrictions  Daily Fluid Restriction: no  Last Modified Barium Swallow with Video (Video Swallowing Test): not done    Treatments at the Time of Hospital Discharge:   Respiratory Treatments: n/a  Oxygen Therapy:  is not on home oxygen therapy. Ventilator:    - No ventilator support    Rehab Therapies: Physical Therapy and Occupational Therapy  Weight Bearing Status/Restrictions: No weight bearing restrictions  Other Medical Equipment (for information only, NOT a DME order):  stedy  Other Treatments: n/a    Patient's personal belongings sent to facility    RN SIGNATURE:  Electronically signed by Karo Packer RN on 12/9/22 at 12:18 PM EST    CASE MANAGEMENT/SOCIAL WORK SECTION    Inpatient Status Date: 11/29/22    Readmission Risk Assessment Score:  Readmission Risk              Risk of Unplanned Readmission:  24           Discharging to Facility/ Agency   Residence at [address] [place] [address]       / signature: Electronically signed by Gloria Estrella RN on 12/9/22 at 9:08 AM EST    PHYSICIAN SECTION    Prognosis: Good    Condition at Discharge: Stable    Rehab Potential (if transferring to Rehab): Good    Recommended Labs or Other Treatments After Discharge: Complex nickerson placement completed by Dr. Nancy Jeong at bedside 11/29/22.   - Nickerson removed on 12/7/22 and voiding trial failed. Keep this catheter on discharge.  Continue flomax. The Urology group will arrange follow up in 6 weeks to see Dr. Karla Boothe        Physician Certification: I certify the above information and transfer of Dinah Susan  is necessary for the continuing treatment of the diagnosis listed and that he requires [facility] for greater 30 days.      Update Admission H&P: No change in H&P    PHYSICIAN SIGNATURE:  Electronically signed by Elton Pennington MD on 12/8/22 at 1:18 PM EST

## 2022-12-08 NOTE — PROCEDURES
[facility] Procedure Note      Provider: Rin BAKER Patient ID:  Admission Date: 2022 Name: Jenny Read   MRN: [id number]   Patient Location: [address] [place], [place] [address] : 1950  Attending: Cruz Blount MD Date of Service: 2022  PCP: Ale JOHNSON     Diagnoses:  Acute urinary retention  Hypospadias  BPH    Procedure:   1. Insertion of nickerson catheter - complicated    Findings:   Yellow urine drained after placement     Indication for Procedure: The patient was informed of the need for bladder drainage based on acute urinary retention. We had a discussion of the procedure. He had a chance to ask questions which were answered prior to the catheterization. Procedure Details:  Hypospadias noted, urethral meatus prepped and iodine and was prepped and draped in the usual fashion. A 16 Fr coude tip urethral catheter was then passed into the bladder. He had return of at least 400 mL of clear yellow urine. 10 mL of sterile water was instilled into the Nickerson balloon. A stat lock was applied to the thigh. He tolerated the procedure well.     EBL:   1 mL    Plan:  See Progress Notes for urology plan regarding Nickerson    Rin BAKER  2022

## 2022-12-08 NOTE — PLAN OF CARE
Patient very agitated at this time, continuously attempting to get out of bed. Hitting legs on bed out of frustration. Geodon administered for agitation and to prevent patient from harming self from behaviors.

## 2022-12-08 NOTE — CARE COORDINATION
Chart reviewed, pt will have nickerson at discharge, Urology f/u in 6weeks. Anticipate discharge today, writer scheduled 5:15pm pickup, updated pt's wife and EGS, explained this is pending Dr Annel Mathews rounding and discharge order.   DILIA King-RN

## 2022-12-08 NOTE — PROGRESS NOTES
Occupational Therapy  Facility/Department: [facility] B3 - MED SURG  Daily Treatment Note  NAME: Diandra Mike  : 1950  MRN: [id number]    Date of Service: 2022    Discharge Recommendations:  Subacute/Skilled Nursing Facility       AM-PAC score  AM-PAC Inpatient Daily Activity Raw Score: 12 (22 1007)  AM-PAC Inpatient ADL T-Scale Score : 30.6 (22 1007)  ADL Inpatient CMS 0-100% Score: 66.57 (22 1007)  ADL Inpatient CMS G-Code Modifier : CL (22 1007)    Patient Diagnosis(es): The primary encounter diagnosis was Acute encephalopathy. Diagnoses of MIYA (acute kidney injury) ([facility] [address] Elevated procalcitonin, and Acute urinary retention were also pertinent to this visit. Assessment    Assessment: Pt with good tolerance of OT treatment, pt pleasant and cooperative throughout. Pt demos good strength with some activities, and at other times requires mod-max A of 2. Pt functional status limited by cognitive deficits and processing of commands, requires hand over hand, visual and tactile cueing at times with grooming tasks. Co-tx collaboration this date with PT staff to safely progress pt toward goals. Pt will have better occupational performance outcomes within a co-treatment than 1:1 session. Pt functioning below his baseline and would benefit from SNF at d/c. Activity Tolerance: Patient tolerated treatment well;Treatment limited secondary to decreased cognition  Discharge Recommendations: Subacute/Skilled Nursing Facility      Plan   Occupational Therapy Plan  Times Per Week: 2-3x     Restrictions  Restrictions/Precautions  Restrictions/Precautions: Fall Risk;Up as Tolerated;General Precautions  Position Activity Restriction  Other position/activity restrictions: risk of elopement, Stage IV wound on buttocks    Subjective   Subjective  Subjective: Pt resting in bed, pleasantly confused. Pt agreeable to OT treatment. Pain: Pt reports \"a little pain\" in eyes.     Orientation  Overall Orientation Status: Impaired  Orientation Level: Oriented to person;Disoriented to place; Disoriented to situation;Disoriented to time    Cognition  Overall Cognitive Status: Exceptions  Arousal/Alertness: Delayed responses to stimuli  Following Commands: Follows one step commands with repetition; Follows one step commands with increased time  Attention Span: Attends with cues to redirect  Memory: Decreased recall of biographical Information;Decreased short term memory;Decreased recall of recent events;Decreased long term memory  Safety Judgement: Decreased awareness of need for safety;Decreased awareness of need for assistance  Problem Solving: Assistance required to identify errors made;Assistance required to correct errors made;Assistance required to implement solutions;Assistance required to generate solutions;Decreased awareness of errors  Insights: Decreased awareness of deficits  Initiation: Requires cues for all  Sequencing: Requires cues for all  Cognition Comment: pt pleasantly confused, agreeable to treatment and cooperative, requires VCs and manual cues to stay on task with exercises and for trasnfers. Objective    Vitals  Vitals:    12/08/22 0922   BP: 110/68   Pulse: 85   Resp:    Temp:    SpO2: 99%       Bed Mobility Training  Bed Mobility Training: Yes  Interventions: Manual cues; Tactile cues; Verbal cues; Safety awareness training  Supine to Sit: Moderate assistance;Maximum assistance;Assist X2 (to R with HOB elevated)  Sit to Supine: Stand-by assistance    Balance  Sitting: Intact  Standing: Impaired (RW)  Standing - Static: Fair  Standing - Dynamic: Poor    Transfer Training  Transfer Training: Yes (with RW)  Interventions: Verbal cues; Tactile cues;Manual cues; Safety awareness training  Sit to Stand: Moderate assistance;Assist X2;Additional time  Stand to Sit: Moderate assistance;Assist X2;Additional time  Toilet Transfer: Moderate assistance;Assist X2;Additional time; Adaptive equipment (dora grab bars)       ADL  Grooming: Setup;Minimal assistance;Verbal cueing; Increased time to complete  Grooming Skilled Clinical Factors: to wash face, brush teeth, and use mouth wash bed level  LE Dressing: Maximum assistance  LE Dressing Skilled Clinical Factors: briefs  Toileting: Maximum assistance          Safety Devices  Type of Devices: Left in bed;Bed alarm in place;Call light within reach;Nurse notified;Gait belt;Telesitter in use       Patient Education  Education Given To: Patient  Education Provided: Role of Therapy;Plan of Care;ADL Adaptive Strategies;Transfer Training; Fall Prevention Strategies;Orientation  Education Method: Demonstration;Verbal  Barriers to Learning: Cognition  Education Outcome: Verbalized understanding;Continued education needed    Goals  Short Term Goals  Time Frame for Short Term Goals: 1 week (12/8) unless noted  Short Term Goal 1: Perform functional transfers with min A-- GOAL NOT MET, mod A of 2 12/8/22  Short Term Goal 2: Perform 1-2 grooming tasks while seated with SBA-- GOAL NOT MET, NT 12/8/22  Short Term Goal 3: Perform UE exer 15x each for endurance by 12/5-- GOAL NOT MET, NT 12/8/22  Patient Goals   Patient goals : Pt did not provide       Therapy Time   Individual Concurrent Group Co-treatment   Time In 7876; Maikol 189         Time Out 2223; 8809         IFBQGES 56; [facility]

## 2022-12-08 NOTE — PLAN OF CARE
Problem: Discharge Planning  Goal: Discharge to home or other facility with appropriate resources  Outcome: Progressing     Problem: Pain  Goal: Verbalizes/displays adequate comfort level or baseline comfort level  Outcome: Progressing     Problem: Safety - Adult  Goal: Free from fall injury  Outcome: Progressing     Problem: Chronic Conditions and Co-morbidities  Goal: Patient's chronic conditions and co-morbidity symptoms are monitored and maintained or improved  Outcome: Progressing     Problem: Skin/Tissue Integrity  Goal: Absence of new skin breakdown  Description: 1.   Monitor for areas of redness and/or skin breakdown  Outcome: Progressing     Problem: Cardiovascular - Adult  Goal: Maintains optimal cardiac output and hemodynamic stability  Outcome: Progressing  Goal: Absence of cardiac dysrhythmias or at baseline  Outcome: Progressing     Problem: Metabolic/Fluid and Electrolytes - Adult  Goal: Electrolytes maintained within normal limits  Outcome: Progressing  Goal: Hemodynamic stability and optimal renal function maintained  Outcome: Progressing  Goal: Glucose maintained within prescribed range  Outcome: Progressing     Problem: Hematologic - Adult  Goal: Maintains hematologic stability  Outcome: Progressing     Problem: Nutrition Deficit:  Goal: Optimize nutritional status  Outcome: Progressing     Problem: Neurosensory - Adult  Goal: Achieves stable or improved neurological status  Outcome: Progressing     Problem: Skin/Tissue Integrity - Adult  Goal: Skin integrity remains intact  Outcome: Progressing     Problem: Genitourinary - Adult  Goal: Absence of urinary retention  Outcome: Progressing  Goal: Urinary catheter remains patent  Outcome: Progressing     Problem: Confusion  Goal: Confusion, delirium, dementia, or psychosis is improved or at baseline  Outcome: Progressing     Problem: Musculoskeletal - Adult  Goal: Return mobility to safest level of function  Outcome: Progressing  Goal: Return ADL status to a safe level of function  Outcome: Progressing

## 2022-12-08 NOTE — PROGRESS NOTES
Physical Therapy  Facility/Department: [facility] B3 - MED SURG  Daily Treatment Note  NAME: Eleonora Guzman  : 1950  MRN: [id number]    Date of Service: 2022    Discharge Recommendations:  Subacute/Skilled Nursing Facility   PT Equipment Recommendations  Equipment Needed: No  Other: defer to next level of care    AM-PAC Mobility Inpatient   How much difficulty turning over in bed?: A Lot  How much difficulty sitting down on / standing up from a chair with arms?: A Lot  How much difficulty moving from lying on back to sitting on side of bed?: A Lot  How much help from another person moving to and from a bed to a chair?: A Lot  How much help from another person needed to walk in hospital room?: A Lot  How much help from another person for climbing 3-5 steps with a railing?: A Lot  AM-PAC Inpatient Mobility Raw Score : 12  AM-PAC Inpatient T-Scale Score : 35.33  Mobility Inpatient CMS 0-100% Score: 68.66  Mobility Inpatient CMS G-Code Modifier : CL     Patient Diagnosis(es): The primary encounter diagnosis was Acute encephalopathy. Diagnoses of MIYA (acute kidney injury) ([facility] [address] Elevated procalcitonin, and Acute urinary retention were also pertinent to this visit. Assessment   Assessment: Pt confused but pleasant and cooperative, needed extra processing time for commands and occassional tactile cues to initate tasks. Pt required mod A of 2 supine to sit and STS at the RW. Pt ambulated to and from the commode with RW mod A Of 2. Pt is recommended for con't skilled PT and SNF at D/C. Activity Tolerance: Patient tolerated treatment well;Treatment limited secondary to decreased cognition  Equipment Needed: No  Other: defer to next level of care     Plan    Physcial Therapy Plan  General Plan: 3-5 times per week  Specific Instructions for Next Treatment: Progress mobility as tolerated  Current Treatment Recommendations: Strengthening;Home exercise program;Balance training;Gait training; Safety education & training; Therapeutic activities; Functional mobility training;Patient/Caregiver education & training;Neuromuscular re-education;Transfer training;Equipment evaluation, education, & procurement;Positioning; Endurance training     Restrictions  Restrictions/Precautions  Restrictions/Precautions: Fall Risk, Up as Tolerated, General Precautions  Position Activity Restriction  Other position/activity restrictions: risk of elopement, Stage IV wound on buttocks     Subjective    Subjective  Subjective: Pt agreeable, confused  Pain: pain in \"eyes\" points to R and L eye, \"a little\"  Orientation  Overall Orientation Status: Impaired  Orientation Level: Oriented to person;Disoriented to place; Disoriented to situation;Disoriented to time  Cognition  Overall Cognitive Status: Exceptions  Arousal/Alertness: Delayed responses to stimuli  Following Commands: Follows one step commands with repetition; Follows one step commands with increased time  Attention Span: Attends with cues to redirect  Memory: Decreased recall of biographical Information;Decreased short term memory;Decreased recall of recent events;Decreased long term memory  Safety Judgement: Decreased awareness of need for safety;Decreased awareness of need for assistance  Problem Solving: Assistance required to identify errors made;Assistance required to correct errors made;Assistance required to implement solutions;Assistance required to generate solutions;Decreased awareness of errors  Insights: Decreased awareness of deficits  Initiation: Requires cues for all  Sequencing: Requires cues for all  Cognition Comment: pt pleasantly confused, agreeable to treatment and cooperative, requires VCs and manual cues to stay on task with exercises and for trasnfers. Objective   Vitals  Heart Rate: 85  BP: 110/68  MAP (Calculated): 82  SpO2: 99 %  O2 Device: None (Room air)  Bed Mobility Training  Bed Mobility Training: Yes  Interventions: Manual cues; Tactile cues; Verbal cues;Safety awareness training  Supine to Sit: Moderate assistance;Maximum assistance;Assist X2 (to R with HOB elevated)  Sit to Supine: Stand-by assistance  Balance  Sitting: Intact  Standing: Impaired (RW)  Standing - Static: Fair  Standing - Dynamic: Poor  Transfer Training  Transfer Training: Yes (with RW)  Interventions: Verbal cues; Tactile cues;Manual cues; Safety awareness training  Sit to Stand: Moderate assistance;Assist X2;Additional time  Stand to Sit: Moderate assistance;Assist X2;Additional time  Toilet Transfer: Moderate assistance;Assist X2;Additional time; Adaptive equipment (dora grab bars)  Gait Training  Gait Training: Yes  Gait  Overall Level of Assistance: Minimum assistance;Assist X2;Additional time  Interventions: Manual cues; Safety awareness training; Tactile cues; Verbal cues; Weight shifting training/pressure relief  Base of Support: Widened  Speed/Nathalie: Slow;Shuffled  Step Length: Left shortened;Right shortened  Gait Abnormalities: Path deviations; Shuffling gait; Decreased step clearance; Step to gait  Distance (ft): 10 Feet (X 2)  Assistive Device: Gait belt;Walker, rolling     PT Exercises  Exercise Treatment: BLE exs supine: heelsides, AP and abd X 10; BUE supine: elbow flex/ext and shld flex X 10     Safety Devices  Type of Devices: Left in bed;Bed alarm in place;Call light within reach;Nurse notified;Gait belt;Telesitter in use  Restraints  Restraints Initially in Place: No       Goals  Short Term Goals  Time Frame for Short Term Goals: 1 week 12/08/22 (unless otherwise specified)  Short Term Goal 1: 12/05/22: Pt will complete bed mobility with SBA -- Extend to 12/9/221; 12/8 mod A of 2  Short Term Goal 2: Pt will complete sit to/from stand with LRAD with CGA; 12/8 mod A of 2  Short Term Goal 3: Pt will ambulate 48' with LRAD with CGA without LOB; 12/8 10' X 2 RW mod A of 2  Patient Goals   Patient Goals : Pt is unable to state d/t cognition    Education  Patient Education  Education Given

## 2022-12-08 NOTE — PLAN OF CARE
Patient's EF (Ejection Fraction) is 40%    Heart Failure Medications:  Diuretics[de-identified] None    (One of the following REQUIRED for EF </= 40%/SYSTOLIC FAILURE but MAY be used in EF% >40%/DIASTOLIC FAILURE)        ACE[de-identified] None        ARB[de-identified] None         ARNI[de-identified] None    (Beta Blockers)  NON- Evidenced Based Beta Blocker (for EF% >40%/DIASTOLIC FAILURE): None    Evidenced Based Beta Blocker::(REQUIRED for EF% <40%/SYSTOLIC FAILURE) None  . .................................................................................................................................................. Patient's weights and intake/output reviewed: Yes    Patient's Last Weight: 184 lbs obtained by bed scale. Difference of 1 lbs less than last documented weight. Intake/Output Summary (Last 24 hours) at 12/8/2022 0534  Last data filed at 12/7/2022 2300  Gross per 24 hour   Intake 730 ml   Output 275 ml   Net 455 ml       Education Booklet Provided: yes    Comorbidities Reviewed Yes    Patient has a past medical history of Acute cystitis without hematuria, Arthritis, Cerebellar infarct ([address] Cerebral artery occlusion with cerebral infarction ([address] Diabetes mellitus ([address] Diabetic nephropathy with proteinuria ([address] Diabetic peripheral neuropathy ([address] Diabetic ulcer of left great toe ([address] Diabetic ulcer of left midfoot associated with type 2 diabetes mellitus ([address] Disease of blood and blood forming organ, Dizziness, Hyperlipidemia, Hypertension, Hypomagnesemia, Moderate non-proliferative diabetic retinopathy ([address] Movement disorder, and Sepsis due to urinary tract infection ([facility] [address] >>For CHF and Comorbidity documentation on Education Time and Topics, please see Education Tab    Progressive Mobility Assessment:  What is this patient's Current Level of Mobility?: Requires Bed Rest  How was this patient Mobilized today?: Unable to Mobilize, ambulated 0 ft                 With Whom?  Self                 Level of Difficulty/Assistance: 2x Assist     Pt resting in bed at this time on room air. Pt denies shortness of breath. Pt without lower extremity edema.      Patient and/or Family's stated Goal of Care this Admission: be more comfortable prior to discharge        :

## 2022-12-08 NOTE — PROGRESS NOTES
Hospitalist Progress Note      PCP: Annel Hays    Date of Admission: 11/28/2022    Chief Complaint: Confusion, urinary retention    Subjective: Some agitation this afternoon. H/O dementia. He wants to leave the hospital soon. Medications:  Reviewed    Infusion Medications    sodium chloride 25 mL/hr at 12/03/22 1559    dextrose       Scheduled Medications    tamsulosin  0.4 mg Oral Daily    collagenase   Topical Daily    QUEtiapine  12.5 mg Oral Daily    QUEtiapine  50 mg Oral Dinner    atorvastatin  10 mg Oral Nightly    insulin glargine  5 Units SubCUTAneous Nightly    sodium chloride flush  5-40 mL IntraVENous 2 times per day    insulin lispro  0-8 Units SubCUTAneous TID WC    insulin lispro  0-4 Units SubCUTAneous Nightly    apixaban  5 mg Oral BID    sertraline  25 mg Oral Daily    divalproex  125 mg Oral 2 times per day     PRN Meds: haloperidol, sodium chloride flush, sodium chloride, ondansetron **OR** ondansetron, acetaminophen **OR** acetaminophen, glucose, dextrose bolus **OR** dextrose bolus, glucagon (rDNA), dextrose, ziprasidone      Intake/Output Summary (Last 24 hours) at 12/8/2022 1700  Last data filed at 12/8/2022 6696  Gross per 24 hour   Intake 370 ml   Output 100 ml   Net 270 ml         Physical Exam Performed:    /67   Pulse 81   Temp 98.1 °F (36.7 °C) (Oral)   Resp 18   Ht 6' 4\" (1.93 m)   Wt 184 lb 3.5 oz (83.6 kg)   SpO2 98%   BMI 22.42 kg/m²     General appearance: No apparent distress, appears stated age and demented. HEENT: Pupils equal, round, and reactive to light. Conjunctivae/corneas clear. Neck: Supple, with full range of motion. No jugular venous distention. Trachea midline. Respiratory:  Normal respiratory effort. Clear to auscultation, bilaterally without Rales/Wheezes/Rhonchi. Cardiovascular: Regular rate and rhythm with normal S1/S2 without murmurs, rubs or gallops.   Abdomen: Soft, non-tender, non-distended with normal bowel sounds. Musculoskeletal: No clubbing, cyanosis or edema bilaterally. Skin: Skin color, texture, turgor normal.  No rashes or lesions. Neurologic:  Neurovascularly intact without any focal sensory/motor deficits. Cranial nerves: II-XII intact, grossly non-focal.  Psychiatric: Alert and oriented to self only  Capillary Refill: Brisk,< 3 seconds   Peripheral Pulses: +2 palpable, equal bilaterally       Labs:   Recent Labs     12/06/22  0658 12/07/22  0706 12/08/22  0653   WBC 6.5 6.8 7.3   HGB 11.2* 10.9* 11.6*   HCT 33.1* 32.5* 34.2*    400 403       Recent Labs     12/06/22  0657 12/07/22  0706 12/08/22  0653    137 138   K 4.3 4.5 4.5    98* 99   CO2 30 33* 30   BUN 16 20 29*   CREATININE 0.7* 0.8 0.9   CALCIUM 8.8 9.2 9.0   PHOS 3.1 3.9 3.4       No results for input(s): AST, ALT, BILIDIR, BILITOT, ALKPHOS in the last 72 hours. No results for input(s): INR in the last 72 hours. No results for input(s): Aleene Sauger in the last 72 hours.     Urinalysis:      Lab Results   Component Value Date/Time    NITRU Negative 11/29/2022 10:50 AM    WBCUA  11/29/2022 10:50 AM    BACTERIA 4+ 11/29/2022 10:50 AM    RBCUA 5-10 11/29/2022 10:50 AM    BLOODU SMALL 11/29/2022 10:50 AM    SPECGRAV 1.020 11/29/2022 10:50 AM    GLUCOSEU Negative 11/29/2022 10:50 AM       Consults:    IP CONSULT TO UROLOGY  IP CONSULT TO PHARMACY  IP CONSULT TO UROLOGY  IP CONSULT TO PSYCHIATRY  IP CONSULT TO UROLOGY      Assessment/Plan:    Active Hospital Problems    Diagnosis     MIYA (acute kidney injury) ([facility] [address] [N17.9]      Priority: Medium    Diabetic peripheral neuropathy ([facility] [address] [E11.42]      Priority: Medium    Acute combined systolic and diastolic heart failure (HCC) [I50.41]     Alzheimer's disease ([address] [G30.9, F02.80]     UTI (urinary tract infection) [N39.0]     Essential hypertension [I10]     Mixed hyperlipidemia [E78.2]     BPH with obstruction/lower urinary tract symptoms [N40.1, N13.8]

## 2022-12-08 NOTE — CARE COORDINATION
Writer spoke with Karla/VIOLET, Dr Cr Gomez and primary RN FRIDA [facility], pt is anxious and calling out. Pt has received Geodon IM the past 2 evenings, [facility] wants to see how he does without it tonight. Dr Cr Gomez agrees with this plan, can add PO Haldol PRN, but nothing IM/IV. Writer spoke with [employer], they confirmed this anxiousness is typical and then pt also sundowns, which may be what is happening now, since it is cloudy/dark outside. [employer] will not take pt with nickerson and staged ulcer. Writer updated pt's wife, she appreciates this plan, will touch base again in the morning. Writer also sent referral to Venessa[facility] to ask if they still have a male secure unit.   DILIA Arias-RN

## 2022-12-08 NOTE — PROGRESS NOTES
Urology Progress Note    HPI: 66M with hypospadias admitted with MIYA, UTI, and urinary retention     Subjective: Jennifer Weston nickerson was removed yesterday and he has voided small amounts, but PVR still >500    Vitals:  BP (!) 100/51   Pulse 85   Temp 98.1 °F (36.7 °C) (Oral)   Resp 18   Ht 6' 4\" (1.93 m)   Wt 184 lb 3.5 oz (83.6 kg)   SpO2 99%   BMI 22.42 kg/m²   Temp  Av.3 °F (36.8 °C)  Min: 98 °F (36.7 °C)  Max: 98.9 °F (37.2 °C)    Intake/Output Summary (Last 24 hours) at 2022 1128  Last data filed at 2022 4673  Gross per 24 hour   Intake 730 ml   Output 275 ml   Net 455 ml         Exam   Physical:  Well developed, well nourished in no acute distress  Mood indicates no abnormalities.  Pt doesnt appear depressed  Pleasantly confused, mildly anxious   Neck is supple, trachea is midline  Respiratory effort is normal  Cardiovascular show no extremity swelling  Abdomen no masses or hernias are palpated, there is no tenderness  Skin show no abnormal lesions      Male :  Penis appears normal and is circumcised  Urethral meatus is located on dorsal aspect of penis at mid shaft     Labs:  WBC:    Lab Results   Component Value Date/Time    WBC 7.3 2022 06:53 AM     Hemoglobin/Hematocrit:    Lab Results   Component Value Date/Time    HGB 11.6 2022 06:53 AM    HCT 34.2 2022 06:53 AM     BMP:    Lab Results   Component Value Date/Time     2022 06:53 AM    K 4.5 2022 06:53 AM    K 4.3 2022 12:30 AM    CL 99 2022 06:53 AM    CO2 30 2022 06:53 AM    BUN 29 2022 06:53 AM    LABALBU 3.5 2022 06:53 AM    CREATININE 0.9 2022 06:53 AM    CALCIUM 9.0 2022 06:53 AM    GFRAA >60 2020 12:27 PM    GFRAA >60 2013 11:15 AM    LABGLOM >60 2022 06:53 AM     PT/INR:    Lab Results   Component Value Date/Time    PROTIME 12.3 2018 04:38 AM    INR 1.09 2018 04:38 AM     PTT:    Lab Results   Component Value Date/Time    APTT 27.5 01/05/2018 04:38 AM   [APTT       Impression/Plan:      Urinary retention  Hypospadias  BPH     - Complex nickerson placement completed by Dr. Yefri Wu at bedside 11/29/22.   - Nickerson removed on 12/7/22 and voiding trial failed  - I placed a 16fr coude catheter on 12/8 with >400 cc urine drained on placement.    Keep this catheter on discharge  - cont flomax   - we will arrange follow up in our office in 6 weeks to see Dr. Demetri Harry     - signing off, please call with questions     Rosaura Arriaga PA-C  [facility]

## 2022-12-09 VITALS
TEMPERATURE: 97.8 F | SYSTOLIC BLOOD PRESSURE: 107 MMHG | DIASTOLIC BLOOD PRESSURE: 60 MMHG | RESPIRATION RATE: 18 BRPM | HEART RATE: 80 BPM | WEIGHT: 175.04 LBS | OXYGEN SATURATION: 96 % | HEIGHT: 76 IN | BODY MASS INDEX: 21.32 KG/M2

## 2022-12-09 LAB
GLUCOSE BLD-MCNC: 183 MG/DL (ref 70–99)
GLUCOSE BLD-MCNC: 210 MG/DL (ref 70–99)
PERFORMED ON: ABNORMAL
PERFORMED ON: ABNORMAL
SARS-COV-2, NAAT: NOT DETECTED

## 2022-12-09 PROCEDURE — 6370000000 HC RX 637 (ALT 250 FOR IP): Performed by: NURSE PRACTITIONER

## 2022-12-09 PROCEDURE — 2580000003 HC RX 258: Performed by: NURSE PRACTITIONER

## 2022-12-09 PROCEDURE — 87635 SARS-COV-2 COVID-19 AMP PRB: CPT

## 2022-12-09 PROCEDURE — 6370000000 HC RX 637 (ALT 250 FOR IP): Performed by: INTERNAL MEDICINE

## 2022-12-09 PROCEDURE — 6370000000 HC RX 637 (ALT 250 FOR IP): Performed by: PSYCHIATRY & NEUROLOGY

## 2022-12-09 RX ORDER — HALOPERIDOL 0.5 MG/1
0.5 TABLET ORAL EVERY 6 HOURS PRN
Qty: 120 TABLET | Refills: 3 | DISCHARGE
Start: 2022-12-09

## 2022-12-09 RX ADMIN — COLLAGENASE SANTYL: 250 OINTMENT TOPICAL at 08:57

## 2022-12-09 RX ADMIN — SODIUM CHLORIDE, PRESERVATIVE FREE 10 ML: 5 INJECTION INTRAVENOUS at 08:55

## 2022-12-09 RX ADMIN — TAMSULOSIN HYDROCHLORIDE 0.4 MG: 0.4 CAPSULE ORAL at 08:56

## 2022-12-09 RX ADMIN — APIXABAN 5 MG: 5 TABLET, FILM COATED ORAL at 08:56

## 2022-12-09 RX ADMIN — QUETIAPINE FUMARATE 12.5 MG: 25 TABLET ORAL at 08:56

## 2022-12-09 RX ADMIN — DIVALPROEX SODIUM 125 MG: 125 CAPSULE, COATED PELLETS ORAL at 08:56

## 2022-12-09 RX ADMIN — SERTRALINE HYDROCHLORIDE 25 MG: 50 TABLET ORAL at 08:56

## 2022-12-09 RX ADMIN — INSULIN LISPRO 2 UNITS: 100 INJECTION, SOLUTION INTRAVENOUS; SUBCUTANEOUS at 12:07

## 2022-12-09 ASSESSMENT — PAIN SCALES - PAIN ASSESSMENT IN ADVANCED DEMENTIA (PAINAD)
TOTALSCORE: 0
BREATHING: 0
BODYLANGUAGE: 0
BREATHING: 0
NEGVOCALIZATION: 0
FACIALEXPRESSION: 0
BODYLANGUAGE: 0
TOTALSCORE: 0
NEGVOCALIZATION: 0
CONSOLABILITY: 0
CONSOLABILITY: 0
FACIALEXPRESSION: 0

## 2022-12-09 ASSESSMENT — PAIN SCALES - GENERAL: PAINLEVEL_OUTOF10: 0

## 2022-12-09 NOTE — CARE COORDINATION
Pt tolerated overnight without IM/IV medications. Writer left vmail for Justine[facility] to see if they can accept, and updated Karla/[facility]. Toño Vaughan [place]     3856 Addendum:  [facility] cannot accept pt, [facility]. [facility] Addendum:  [facility] can accept, they are requesting 1pm transport. Writer called [facility], they can do 1:30pm pickup. UZMA Minor     1682 Addendum: [facility] can do 1230pm transport. Writer updated pt's wife.   UZMA Minor

## 2022-12-09 NOTE — PLAN OF CARE
Problem: Discharge Planning  Goal: Discharge to home or other facility with appropriate resources  Outcome: Adequate for Discharge  Flowsheets (Taken 12/9/2022 0859)  Discharge to home or other facility with appropriate resources: Identify barriers to discharge with patient and caregiver     Problem: Pain  Goal: Verbalizes/displays adequate comfort level or baseline comfort level  Outcome: Adequate for Discharge  Flowsheets (Taken 12/9/2022 0845)  Verbalizes/displays adequate comfort level or baseline comfort level: Encourage patient to monitor pain and request assistance     Problem: Safety - Adult  Goal: Free from fall injury  Outcome: Adequate for Discharge     Problem: Chronic Conditions and Co-morbidities  Goal: Patient's chronic conditions and co-morbidity symptoms are monitored and maintained or improved  Outcome: Adequate for Discharge  Flowsheets (Taken 12/9/2022 0859)  Care Plan - Patient's Chronic Conditions and Co-Morbidity Symptoms are Monitored and Maintained or Improved: Monitor and assess patient's chronic conditions and comorbid symptoms for stability, deterioration, or improvement     Problem: Skin/Tissue Integrity  Goal: Absence of new skin breakdown  Description: 1. Monitor for areas of redness and/or skin breakdown  2. Assess vascular access sites hourly  3. Every 4-6 hours minimum:  Change oxygen saturation probe site  4. Every 4-6 hours:  If on nasal continuous positive airway pressure, respiratory therapy assess nares and determine need for appliance change or resting period.   Outcome: Adequate for Discharge     Problem: Cardiovascular - Adult  Goal: Maintains optimal cardiac output and hemodynamic stability  Outcome: Adequate for Discharge  Flowsheets (Taken 12/9/2022 0859)  Maintains optimal cardiac output and hemodynamic stability: Monitor blood pressure and heart rate  Goal: Absence of cardiac dysrhythmias or at baseline  Outcome: Adequate for Discharge  Flowsheets (Taken 12/9/2022 1211)  Absence of cardiac dysrhythmias or at baseline: Monitor cardiac rate and rhythm     Problem: Metabolic/Fluid and Electrolytes - Adult  Goal: Electrolytes maintained within normal limits  Outcome: Adequate for Discharge  Flowsheets (Taken 12/9/2022 0859)  Electrolytes maintained within normal limits: Monitor labs and assess patient for signs and symptoms of electrolyte imbalances  Goal: Hemodynamic stability and optimal renal function maintained  Outcome: Adequate for Discharge  Flowsheets (Taken 12/9/2022 0859)  Hemodynamic stability and optimal renal function maintained: Monitor labs and assess for signs and symptoms of volume excess or deficit  Goal: Glucose maintained within prescribed range  Outcome: Adequate for Discharge  Flowsheets (Taken 12/9/2022 0859)  Glucose maintained within prescribed range: Monitor blood glucose as ordered     Problem: Hematologic - Adult  Goal: Maintains hematologic stability  Outcome: Adequate for Discharge  Flowsheets (Taken 12/9/2022 0859)  Maintains hematologic stability: Assess for signs and symptoms of bleeding or hemorrhage     Problem: Nutrition Deficit:  Goal: Optimize nutritional status  Outcome: Adequate for Discharge     Problem: Neurosensory - Adult  Goal: Achieves stable or improved neurological status  Outcome: Adequate for Discharge  Flowsheets (Taken 12/9/2022 0859)  Achieves stable or improved neurological status: Assess for and report changes in neurological status     Problem: Skin/Tissue Integrity - Adult  Goal: Skin integrity remains intact  Outcome: Adequate for Discharge  Flowsheets  Taken 12/9/2022 1203  Skin Integrity Remains Intact: Monitor for areas of redness and/or skin breakdown  Taken 12/9/2022 0859  Skin Integrity Remains Intact: Monitor for areas of redness and/or skin breakdown     Problem: Genitourinary - Adult  Goal: Absence of urinary retention  Outcome: Adequate for Discharge  Flowsheets (Taken 12/9/2022 0859)  Absence of urinary retention: Assess patients ability to void and empty bladder  Goal: Urinary catheter remains patent  Outcome: Adequate for Discharge  Flowsheets (Taken 12/9/2022 0859)  Urinary catheter remains patent: Assess patency of urinary catheter     Problem: Confusion  Goal: Confusion, delirium, dementia, or psychosis is improved or at baseline  Description: INTERVENTIONS:  1. Assess for possible contributors to thought disturbance, including medications, impaired vision or hearing, underlying metabolic abnormalities, dehydration, psychiatric diagnoses, and notify attending LIP  2. [place] high risk fall precautions, as indicated  3. Provide frequent short contacts to provide reality reorientation, refocusing and direction  4. Decrease environmental stimuli, including noise as appropriate  5. Monitor and intervene to maintain adequate nutrition, hydration, elimination, sleep and activity  6. If unable to ensure safety without constant attention obtain sitter and review sitter guidelines with assigned personnel  7.  Initiate Psychosocial CNS and Spiritual Care consult, as indicated  Outcome: Adequate for Discharge  Flowsheets (Taken 12/9/2022 0859)  Effect of thought disturbance (confusion, delirium, dementia, or psychosis) are managed with adequate functional status: Assess for contributors to thought disturbance, including medications, impaired vision or hearing, underlying metabolic abnormalities, dehydration, psychiatric diagnoses, notify LIP     Problem: Musculoskeletal - Adult  Goal: Return mobility to safest level of function  Outcome: Adequate for Discharge  Flowsheets (Taken 12/9/2022 0859)  Return Mobility to Safest Level of Function: Assess patient stability and activity tolerance for standing, transferring and ambulating with or without assistive devices  Goal: Return ADL status to a safe level of function  Outcome: Adequate for Discharge  Flowsheets (Taken 12/9/2022 0859)  Return ADL Status to a Safe Level of Function: Administer medication as ordered

## 2022-12-09 NOTE — PLAN OF CARE
Problem: Confusion  Goal: Confusion, delirium, dementia, or psychosis is improved or at baseline  Description: INTERVENTIONS:  1. Assess for possible contributors to thought disturbance, including medications, impaired vision or hearing, underlying metabolic abnormalities, dehydration, psychiatric diagnoses, and notify attending LIP  2. [place] high risk fall precautions, as indicated  3. Provide frequent short contacts to provide reality reorientation, refocusing and direction  4. Decrease environmental stimuli, including noise as appropriate  5. Monitor and intervene to maintain adequate nutrition, hydration, elimination, sleep and activity  6. If unable to ensure safety without constant attention obtain sitter and review sitter guidelines with assigned personnel  7.  Initiate Psychosocial CNS and Spiritual Care consult, as indicated  Outcome: Progressing  Flowsheets (Taken 12/8/2022 2143)  Effect of thought disturbance (confusion, delirium, dementia, or psychosis) are managed with adequate functional status:   Assess for contributors to thought disturbance, including medications, impaired vision or hearing, underlying metabolic abnormalities, dehydration, psychiatric diagnoses, notify [place] high risk fall precautions, as indicated   Provide frequent short contacts to provide reality reorientation, refocusing and direction   Decrease environmental stimuli, including noise as appropriate   Monitor and intervene to maintain adequate nutrition, hydration, elimination, sleep and activity

## 2022-12-09 NOTE — PROGRESS NOTES
Urology Progress Note    HPI: 66M with hypospadias admitted with MIYA, UTI, and urinary retention     Subjective: pt sleeping     Vitals:  /60   Pulse 80   Temp 97.8 °F (36.6 °C) (Oral)   Resp 18   Ht 6' 4\" (1.93 m)   Wt 175 lb 0.7 oz (79.4 kg)   SpO2 96%   BMI 21.31 kg/m²   Temp  Av.1 °F (36.7 °C)  Min: 97.8 °F (36.6 °C)  Max: 98.4 °F (36.9 °C)    Intake/Output Summary (Last 24 hours) at 2022 1135  Last data filed at 2022 0907  Gross per 24 hour   Intake 580 ml   Output 2125 ml   Net -1545 ml         Exam   Physical:  Nickerson with clear yellow urine     Labs:  WBC:    Lab Results   Component Value Date/Time    WBC 7.3 2022 06:53 AM     Hemoglobin/Hematocrit:    Lab Results   Component Value Date/Time    HGB 11.6 2022 06:53 AM    HCT 34.2 2022 06:53 AM     BMP:    Lab Results   Component Value Date/Time     2022 06:53 AM    K 4.5 2022 06:53 AM    K 4.3 2022 12:30 AM    CL 99 2022 06:53 AM    CO2 30 2022 06:53 AM    BUN 29 2022 06:53 AM    LABALBU 3.5 2022 06:53 AM    CREATININE 0.9 2022 06:53 AM    CALCIUM 9.0 2022 06:53 AM    GFRAA >60 2020 12:27 PM    GFRAA >60 2013 11:15 AM    LABGLOM >60 2022 06:53 AM     PT/INR:    Lab Results   Component Value Date/Time    PROTIME 12.3 2018 04:38 AM    INR 1.09 2018 04:38 AM     PTT:    Lab Results   Component Value Date/Time    APTT 27.5 2018 04:38 AM   [APTT       Impression/Plan:      Urinary retention  Hypospadias  BPH     - Complex nickerson placement completed by Dr. Maurilio Lay at bedside 22.   - Nickerson removed on 22 and voiding trial failed  - I placed a 16fr coude catheter on  with >400 cc urine drained on placement.    Keep this catheter on discharge  - cont flomax   - we will arrange follow up in our office in 6 weeks to see Dr. Karla Boothe     - signing off, please call with questions     Nixon Martino PA-C  [facility]

## 2022-12-09 NOTE — PROGRESS NOTES
Patient discharged to [facility], report called to receiving nurse. IV removed with no complications. Discharge paperwork and ESTUARDO given to EMS. All belongings sent with patient. Patient transported via EMS.

## 2022-12-09 NOTE — CARE COORDINATION
CASE MANAGEMENT DISCHARGE SUMMARY    Discharge to: Residence at [address] completed: [facility] Exemption Notification (HENS) completed: yes online    IMM given: 12/8 with wife via phone    Transportation:    Medical Transport explained to pt/family. Pt/family voice no agency preference. [facility]  up time: 12:30pm   Ambulance form completed: Yes    Confirmed discharge plan with:   Patient: yes     Family:  yes, wife      Facility/Agency, name:  [facility] faxed to [phone], confirmed plan with Justine/admissions    Phone number for report to facility: [phone], pt will be going to [facility] unit     RN, name: Curt Jennifer    Note: Discharging nurse to obtain rapid Covid per order, complete ESTUARDO, reconcile AVS, and place final copy with patient's discharge packet. RN to ensure that written prescriptions for Level II medications are sent with patient to the facility as per protocol.   DILIA Ramirez-RN

## 2022-12-11 ENCOUNTER — APPOINTMENT (OUTPATIENT)
Dept: CT IMAGING | Age: 72
End: 2022-12-11
Payer: MEDICARE

## 2022-12-11 ENCOUNTER — HOSPITAL ENCOUNTER (EMERGENCY)
Age: 72
Discharge: HOSPICE | End: 2022-12-11
Attending: EMERGENCY MEDICINE
Payer: MEDICARE

## 2022-12-11 ENCOUNTER — APPOINTMENT (OUTPATIENT)
Dept: GENERAL RADIOLOGY | Age: 72
End: 2022-12-11
Payer: MEDICARE

## 2022-12-11 VITALS
OXYGEN SATURATION: 98 % | HEIGHT: 76 IN | RESPIRATION RATE: 17 BRPM | SYSTOLIC BLOOD PRESSURE: 79 MMHG | HEART RATE: 81 BPM | DIASTOLIC BLOOD PRESSURE: 48 MMHG | WEIGHT: 175 LBS | BODY MASS INDEX: 21.31 KG/M2

## 2022-12-11 DIAGNOSIS — R65.21 SEPTIC SHOCK (HCC): ICD-10-CM

## 2022-12-11 DIAGNOSIS — A41.9 SEPTIC SHOCK (HCC): ICD-10-CM

## 2022-12-11 DIAGNOSIS — N30.00 ACUTE CYSTITIS WITHOUT HEMATURIA: ICD-10-CM

## 2022-12-11 DIAGNOSIS — G93.40 ACUTE ENCEPHALOPATHY: Primary | ICD-10-CM

## 2022-12-11 LAB
A/G RATIO: 0.9 (ref 1.1–2.2)
ALBUMIN SERPL-MCNC: 3.5 G/DL (ref 3.4–5)
ALP BLD-CCNC: 96 U/L (ref 40–129)
ALT SERPL-CCNC: 34 U/L (ref 10–40)
ANION GAP SERPL CALCULATED.3IONS-SCNC: 17 MMOL/L (ref 3–16)
AST SERPL-CCNC: 33 U/L (ref 15–37)
BACTERIA: ABNORMAL /HPF
BASOPHILS ABSOLUTE: 0.2 K/UL (ref 0–0.2)
BASOPHILS RELATIVE PERCENT: 0.5 %
BILIRUB SERPL-MCNC: 1.2 MG/DL (ref 0–1)
BILIRUBIN URINE: NEGATIVE
BLOOD, URINE: ABNORMAL
BUN BLDV-MCNC: 62 MG/DL (ref 7–20)
CALCIUM SERPL-MCNC: 9.2 MG/DL (ref 8.3–10.6)
CHLORIDE BLD-SCNC: 92 MMOL/L (ref 99–110)
CLARITY: ABNORMAL
CO2: 22 MMOL/L (ref 21–32)
COLOR: YELLOW
CREAT SERPL-MCNC: 3.4 MG/DL (ref 0.8–1.3)
EKG ATRIAL RATE: 340 BPM
EKG DIAGNOSIS: NORMAL
EKG P-R INTERVAL: 150 MS
EKG Q-T INTERVAL: 414 MS
EKG QRS DURATION: 110 MS
EKG QTC CALCULATION (BAZETT): 462 MS
EKG R AXIS: -38 DEGREES
EKG T AXIS: 73 DEGREES
EKG VENTRICULAR RATE: 75 BPM
EOSINOPHILS ABSOLUTE: 0 K/UL (ref 0–0.6)
EOSINOPHILS RELATIVE PERCENT: 0 %
GFR SERPL CREATININE-BSD FRML MDRD: 18 ML/MIN/{1.73_M2}
GLUCOSE BLD-MCNC: 222 MG/DL (ref 70–99)
GLUCOSE BLD-MCNC: 226 MG/DL (ref 70–99)
GLUCOSE URINE: NEGATIVE MG/DL
HCT VFR BLD CALC: 31.3 % (ref 40.5–52.5)
HEMOGLOBIN: 10.5 G/DL (ref 13.5–17.5)
INFLUENZA A: NOT DETECTED
INFLUENZA B: NOT DETECTED
KETONES, URINE: ABNORMAL MG/DL
LACTIC ACID, SEPSIS: 4.4 MMOL/L (ref 0.4–1.9)
LEUKOCYTE ESTERASE, URINE: ABNORMAL
LYMPHOCYTES ABSOLUTE: 0.5 K/UL (ref 1–5.1)
LYMPHOCYTES RELATIVE PERCENT: 1.2 %
MCH RBC QN AUTO: 29.4 PG (ref 26–34)
MCHC RBC AUTO-ENTMCNC: 33.5 G/DL (ref 31–36)
MCV RBC AUTO: 87.7 FL (ref 80–100)
MICROSCOPIC EXAMINATION: YES
MONOCYTES ABSOLUTE: 1.8 K/UL (ref 0–1.3)
MONOCYTES RELATIVE PERCENT: 4.6 %
NEUTROPHILS ABSOLUTE: 36.9 K/UL (ref 1.7–7.7)
NEUTROPHILS RELATIVE PERCENT: 93.7 %
NITRITE, URINE: NEGATIVE
PDW BLD-RTO: 15.4 % (ref 12.4–15.4)
PERFORMED ON: ABNORMAL
PH UA: 6 (ref 5–8)
PLATELET # BLD: 400 K/UL (ref 135–450)
PMV BLD AUTO: 8 FL (ref 5–10.5)
POTASSIUM SERPL-SCNC: 5.2 MMOL/L (ref 3.5–5.1)
PROCALCITONIN: >100 NG/ML (ref 0–0.15)
PROTEIN UA: 100 MG/DL
RBC # BLD: 3.57 M/UL (ref 4.2–5.9)
RBC UA: ABNORMAL /HPF (ref 0–4)
SARS-COV-2 RNA, RT PCR: NOT DETECTED
SODIUM BLD-SCNC: 131 MMOL/L (ref 136–145)
SPECIFIC GRAVITY UA: 1.02 (ref 1–1.03)
TOTAL PROTEIN: 7.4 G/DL (ref 6.4–8.2)
TROPONIN: 0.14 NG/ML
URINE TYPE: ABNORMAL
UROBILINOGEN, URINE: 0.2 E.U./DL
WBC # BLD: 39.4 K/UL (ref 4–11)
WBC UA: >100 /HPF (ref 0–5)

## 2022-12-11 PROCEDURE — 71045 X-RAY EXAM CHEST 1 VIEW: CPT

## 2022-12-11 PROCEDURE — 96361 HYDRATE IV INFUSION ADD-ON: CPT

## 2022-12-11 PROCEDURE — 99285 EMERGENCY DEPT VISIT HI MDM: CPT

## 2022-12-11 PROCEDURE — 93005 ELECTROCARDIOGRAM TRACING: CPT | Performed by: EMERGENCY MEDICINE

## 2022-12-11 PROCEDURE — 2580000003 HC RX 258: Performed by: EMERGENCY MEDICINE

## 2022-12-11 PROCEDURE — 6360000002 HC RX W HCPCS: Performed by: EMERGENCY MEDICINE

## 2022-12-11 PROCEDURE — 81001 URINALYSIS AUTO W/SCOPE: CPT

## 2022-12-11 PROCEDURE — 80053 COMPREHEN METABOLIC PANEL: CPT

## 2022-12-11 PROCEDURE — 85025 COMPLETE CBC W/AUTO DIFF WBC: CPT

## 2022-12-11 PROCEDURE — 87150 DNA/RNA AMPLIFIED PROBE: CPT

## 2022-12-11 PROCEDURE — 70450 CT HEAD/BRAIN W/O DYE: CPT

## 2022-12-11 PROCEDURE — 83605 ASSAY OF LACTIC ACID: CPT

## 2022-12-11 PROCEDURE — 84484 ASSAY OF TROPONIN QUANT: CPT

## 2022-12-11 PROCEDURE — 93010 ELECTROCARDIOGRAM REPORT: CPT | Performed by: INTERNAL MEDICINE

## 2022-12-11 PROCEDURE — 84145 PROCALCITONIN (PCT): CPT

## 2022-12-11 PROCEDURE — 96374 THER/PROPH/DIAG INJ IV PUSH: CPT

## 2022-12-11 PROCEDURE — 87636 SARSCOV2 & INF A&B AMP PRB: CPT

## 2022-12-11 PROCEDURE — 87040 BLOOD CULTURE FOR BACTERIA: CPT

## 2022-12-11 RX ORDER — 0.9 % SODIUM CHLORIDE 0.9 %
30 INTRAVENOUS SOLUTION INTRAVENOUS ONCE
Status: DISCONTINUED | OUTPATIENT
Start: 2022-12-11 | End: 2022-12-11

## 2022-12-11 RX ORDER — LORAZEPAM 0.5 MG/1
0.5 TABLET ORAL ONCE
Status: DISCONTINUED | OUTPATIENT
Start: 2022-12-11 | End: 2022-12-11

## 2022-12-11 RX ORDER — 0.9 % SODIUM CHLORIDE 0.9 %
1000 INTRAVENOUS SOLUTION INTRAVENOUS ONCE
Status: COMPLETED | OUTPATIENT
Start: 2022-12-11 | End: 2022-12-11

## 2022-12-11 RX ORDER — LORAZEPAM 2 MG/ML
0.5 INJECTION INTRAMUSCULAR ONCE
Status: COMPLETED | OUTPATIENT
Start: 2022-12-11 | End: 2022-12-11

## 2022-12-11 RX ADMIN — LORAZEPAM 0.5 MG: 2 INJECTION INTRAMUSCULAR at 15:32

## 2022-12-11 RX ADMIN — SODIUM CHLORIDE 1000 ML: 9 INJECTION, SOLUTION INTRAVENOUS at 13:58

## 2022-12-11 ASSESSMENT — PAIN - FUNCTIONAL ASSESSMENT: PAIN_FUNCTIONAL_ASSESSMENT: NONE - DENIES PAIN

## 2022-12-11 NOTE — ED NOTES
Pericare with depends change provided. EMS here to transport patient to Hospice. Family left ahead.       Cheyanne Busch RN  12/11/22 0566

## 2022-12-11 NOTE — ED NOTES
1555 - call to  to reach hospice   Re: Multi-System Organ Failure  1406 -  called back to speak with RN Wale Weiner  [phone] - called Ronnie Min at [facility] directly to have on-call RN for [facility] to come to facility; Kelly Mccall the [facility] coordinator is on the case to possibly transfer pt as a direct admit to [facility] at [facility]. On-call RN for [facility] unavailable until later this evening  1500 - Cathie Torres at [facility] transfer coordination center called to speak with Dr Ronald Malone to see if pt qualifies for inpt HOC direct admission. [address] [facility] speaking with family via phone at pt's bedside at this time  [phone] - Pt is accepted as direct inpt admit to [facility] in [facility]. Faxed 17 page packet from [facility] to Cathie Torres for H&P. Cathie Torres will call back with transport ETA. Angelia called to notify that transport is [place] ETA [id number]. Family notified to arrive 30 mins prior to pt leaving. Dr Cardenas Search in to update the family as well. Pt will be going to acute inpt admission, family notified that there is to out of pocket cost per Cathie Torres.  Family agreeable to plan       Jerrod Ojeda  12/11/22 2146

## 2022-12-11 NOTE — CARE COORDINATION
CM received call from ED asking for CM to consult Hospice. CM called [address] [facility] who will call and speak with family to see what there desire is and if they can meet their needs. RN updated. HOC will update RN.

## 2022-12-11 NOTE — PROGRESS NOTES
12/11/22 1516   Encounter Summary   Encounter Overview/Reason  Initial Encounter;Spiritual/Emotional Needs; Rituals, Rites and Sacraments;Grief, Loss, and Adjustments   Service Provided For: Patient and family together  (Pt unresponsive)   Referral/Consult From: Physician;Family   Support System Spouse; Children;Family members; Religious/bobby community   Last Encounter  12/11/22  (EOL spt/prayer/[place] quilt w/unresponsive pt & family. Called [facility] pt.)   Complexity of Encounter High   Begin Time 1348   End Time  1507   Total Time Calculated 79 min   Encounter    Type Initial Screen/Assessment   Spiritual/Emotional needs   Type Spiritual Support   Rituals, Rites and Sacraments   Type Sacrament of Sick   Grief, Loss, and Adjustments   Type End of Life   Assessment/Intervention/Outcome   Assessment Calm;Coping;Sad  (Wife prayed this mng, \"Lord, your will be done. \")   Intervention Active listening;Discussed belief system/Lutheran practices/bobby;Discussed illness injury and its impact; Discussed relationship with God;Empowerment;End of Life Care;Explored/Affirmed feelings, thoughts, concerns;Explored Coping Skills/Resources; Life review/Legacy;Prayer (assurance of)/[place];Sustaining Presence/Ministry of presence   Outcome Acceptance;Comfort;Coping;Encouraged;Engaged in conversation;Expressed feelings, needs, and concerns;Expressed feelings of [place], Peace and/or Love;Expressed Gratitude;Peace; Receptive   Plan and Referrals   Plan/Referrals Continue to visit, (comment)

## 2022-12-11 NOTE — ED PROVIDER NOTES
CHIEF COMPLAINT  Altered Mental Status (Pt to ED with c/o AMS. Pt from [facility]. Last known well was at 0700 this morning. Pt had unwitnessed fall yesterday. Pt is normally more alert, but has hx of dementia. Pt responsive to verbal stimuli )      HISTORY OF PRESENT ILLNESS  Gi Goddard is a 67 y.o. male with a history of CVA, dizziness, hyperlipidemia, hypertension, and movement disorder who presents to the ED complaining of altered mental status. Patient presents from [facility]. Patient had an unwitnessed fall yesterday but had reportedly been doing well. He was last known to be within normal limits at approximately 7 AM.  By report, patient has dementia but typically is conversational and interactive. Patient somnolent and minimally responsive upon arrival.  Patient DNR/comfort care. No other complaints, modifying factors or associated symptoms. I have reviewed the following from the nursing documentation.     Past Medical History:   Diagnosis Date    Acute cystitis without hematuria     Arthritis     Cerebellar infarct [facility])     Cerebral artery occlusion with cerebral infarction ([address]     2013  DIZZY  COULDN'T WALK   RESOLVED    Diabetes mellitus ([address]     Diabetic nephropathy with proteinuria ([address]     Diabetic peripheral neuropathy ([address]     Diabetic ulcer of left great toe ([address] 11/19/2018    Diabetic ulcer of left midfoot associated with type 2 diabetes mellitus ([address] 11/19/2018    Disease of blood and blood forming organ     Dizziness     Hyperlipidemia     Hypertension     Hypomagnesemia 5/23/2018    Moderate non-proliferative diabetic retinopathy ([address]     Movement disorder     Sepsis due to urinary tract infection ([address] 1/5/2018     Past Surgical History:   Procedure Laterality Date    CATARACT REMOVAL  5/7/19 (L) 5/14/19 (R)    COLONOSCOPY      ENDOSCOPY, COLON, DIAGNOSTIC      FOOT SURGERY Right 2011    Ulcer surgery    HYPOSPADIAS CORRECTION      OTHER SURGICAL HISTORY Left 12/20/2016 INCISION AND DRAINAGE LEFT HALLUX         PILONIDAL CYST EXCISION      TONSILLECTOMY      UPPER GASTROINTESTINAL ENDOSCOPY  2017    Dr Catrachito Castle     Family History   Problem Relation Age of Onset    Cancer Mother         Cancer    Cancer Father         Bone cancer    No Known Problems Sister     Cancer Sister         Lung cancer    No Known Problems Brother     No Known Problems Maternal Aunt     No Known Problems Maternal Uncle     No Known Problems Paternal Aunt     No Known Problems Paternal Uncle     No Known Problems Maternal Grandmother     No Known Problems Maternal Grandfather     No Known Problems Paternal Grandmother     No Known Problems Paternal Grandfather     No Known Problems Other     Anesth Problems Neg Hx     Broken Bones Neg Hx     Clotting Disorder Neg Hx     Collagen Disease Neg Hx     Diabetes Neg Hx     Dislocations Neg Hx     Osteoporosis Neg Hx     Rheumatologic Disease Neg Hx     Scoliosis Neg Hx     Severe Sprains Neg Hx      Social History     Socioeconomic History    Marital status:      Spouse name: Lara Hurtado    Number of children: 2    Years of education: 25    Highest education level: Master's degree (e.g., MA, MS, Maya, MEd, MSW, IBRAHIMA)   Occupational History    Occupation: retired   Tobacco Use    Smoking status: Former     Packs/day: 1.00     Years: 13.00     Pack years: 13.00     Types: Cigarettes     Quit date: 1981     Years since quittin.4    Smokeless tobacco: Never   Vaping Use    Vaping Use: Never used   Substance and Sexual Activity    Alcohol use: Yes     Comment: rare    Drug use: No    Sexual activity: Not on file   Other Topics Concern    Not on file   Social History Narrative    Not on file     Social Determinants of Health     Financial Resource Strain: Not on file   Food Insecurity: Not on file   Transportation Needs: Not on file   Physical Activity: Not on file   Stress: Not on file   Social Connections: Not on file   Intimate Partner Violence: Not on file   Housing Stability: Not on file     No current facility-administered medications for this encounter. Current Outpatient Medications   Medication Sig Dispense Refill    haloperidol (HALDOL) 0.5 MG tablet Take 1 tablet by mouth every 6 hours as needed for Agitation 120 tablet 3    tamsulosin (FLOMAX) 0.4 MG capsule Take 1 capsule by mouth daily 30 capsule 3    collagenase 250 UNIT/GM ointment Apply topically daily.  1 each 0    atorvastatin (LIPITOR) 40 MG tablet Take 40 mg by mouth nightly      apixaban (ELIQUIS) 5 MG TABS tablet Take 5 mg by mouth 2 times daily      divalproex (DEPAKOTE SPRINKLE) 125 MG capsule Take 125 mg by mouth 2 times daily      insulin lispro (HUMALOG) 100 UNIT/ML injection vial Inject 0-6 Units into the skin 2 times daily (with meals) SLSC  < 250- No coverage  251-300  give 2 units of insulin  301-350  give 4 units of insulin  351-400  give 6 units of insulin  > 400- Call MD      sertraline (ZOLOFT) 25 MG tablet Take 75 mg by mouth daily Takes 25mg and 50mg to total 75mg dose      mirabegron (MYRBETRIQ) 25 MG TB24 Take 25 mg by mouth daily      metoprolol succinate (TOPROL XL) 25 MG extended release tablet Take 25 mg by mouth daily      melatonin 5 MG TABS tablet Take 10 mg by mouth nightly      Insulin Glargine, 2 Unit Dial, (TOUJEO MAX SOLOSTAR) 300 UNIT/ML SOPN Inject 18 Units into the skin nightly      vitamin B-12 (CYANOCOBALAMIN) 1000 MCG tablet Take 1,000 mcg by mouth daily      QUEtiapine (SEROQUEL) 25 MG tablet Take 25 mg by mouth Daily with supper      metFORMIN (GLUCOPHAGE) 850 MG tablet TAKE ONE TABLET BY MOUTH TWICE A DAY WITH MEALS 180 tablet 1    Continuous Blood Gluc Sensor (FREESTYLE SHILOH 14 DAY SENSOR) MISC 1 each by Does not apply route continuous 2 each 5    glucose blood VI test strips (ONE TOUCH ULTRA TEST) strip USE TO TEST ONCE DAILY  E11.9 100 strip 3    ACCU-CHEK BILLY strip TEST TWO TIMES A DAY 50 strip 2     No Known Allergies    REVIEW OF SYSTEMS  10 systems reviewed, pertinent positives per HPI otherwise noted to be negative. PHYSICAL EXAM  BP (!) 103/53   Pulse 76   Resp 19   Ht 6' 4\" (1.93 m)   Wt 175 lb (79.4 kg)   SpO2 98%   BMI 21.30 kg/m²   GENERAL APPEARANCE: Somnolent. Sallyanne Chain HEAD: Normocephalic. Atraumatic. EYES: PERRL. EOM's grossly intact. ENT: Mucous membranes are dry  NECK: Supple, trachea midline. HEART: RRR. Normal S1, S2. No murmurs, rubs or gallops. LUNGS: Respirations unlabored. CTAB. Good air exchange. No wheezes, rales, or rhonchi. Speaking comfortably in full sentences. ABDOMEN: Soft. Non-distended. Non-tender. No guarding or rebound. Normal Bowel sounds. EXTREMITIES: No peripheral edema. MAEE. No acute deformities. SKIN: Warm and dry. No acute rashes. NEUROLOGICAL: Somnolent. GCS 8 CN II-XII intact. No gross facial drooping. Strength 5/5 in all extremities. Sensation intact. No pronator drift. Normal coordination. PSYCHIATRIC: Normal mood and affect. LABS  I have reviewed all labs for this visit.    Results for orders placed or performed during the hospital encounter of 12/11/22   CBC with Auto Differential   Result Value Ref Range    WBC 39.4 (H) 4.0 - 11.0 K/uL    RBC 3.57 (L) 4.20 - 5.90 M/uL    Hemoglobin 10.5 (L) 13.5 - 17.5 g/dL    Hematocrit 31.3 (L) 40.5 - 52.5 %    MCV 87.7 80.0 - 100.0 fL    MCH 29.4 26.0 - 34.0 pg    MCHC 33.5 31.0 - 36.0 g/dL    RDW 15.4 12.4 - 15.4 %    Platelets 944 852 - 813 K/uL    MPV 8.0 5.0 - 10.5 fL    Neutrophils % 93.7 %    Lymphocytes % 1.2 %    Monocytes % 4.6 %    Eosinophils % 0.0 %    Basophils % 0.5 %    Neutrophils Absolute 36.9 (H) 1.7 - 7.7 K/uL    Lymphocytes Absolute 0.5 (L) 1.0 - 5.1 K/uL    Monocytes Absolute 1.8 (H) 0.0 - 1.3 K/uL    Eosinophils Absolute 0.0 0.0 - 0.6 K/uL    Basophils Absolute 0.2 0.0 - 0.2 K/uL   Urinalysis   Result Value Ref Range    Color, UA Yellow Straw/Yellow    Clarity, UA CLOUDY (A) Clear    Glucose, Ur Negative Negative mg/dL    Bilirubin Urine Negative Negative    Ketones, Urine TRACE (A) Negative mg/dL    Specific Gravity, UA 1.020 1.005 - 1.030    Blood, Urine LARGE (A) Negative    pH, UA 6.0 5.0 - 8.0    Protein,  (A) Negative mg/dL    Urobilinogen, Urine 0.2 <2.0 E.U./dL    Nitrite, Urine Negative Negative    Leukocyte Esterase, Urine LARGE (A) Negative    Microscopic Examination YES     Urine Type NotGiven    POCT Glucose   Result Value Ref Range    POC Glucose 222 (H) 70 - 99 mg/dl    Performed on ACCU-CHEK    EKG 12 Lead   Result Value Ref Range    Ventricular Rate 75 BPM    Atrial Rate 340 BPM    QRS Duration 110 ms    Q-T Interval 414 ms    QTc Calculation (Bazett) 462 ms    R Axis -38 degrees    T Axis 73 degrees    Diagnosis       Atrial flutter with variable A-V blockLeft axis deviationNonspecific ST and T wave abnormalityProlonged QTAbnormal ECGWhen compared with ECG of 29-NOV-2022 00:24,Atrial flutter has replaced Sinus rhythm       EKG  Significant motion artifact. Atrial flutter with variable AV block. Left axis deviation. .  . Otherwise normal intervals and durations. Atrial flutter appears to have replaced normal sinus rhythm from previous EKG on November 29, 2022. RADIOLOGY  X-RAYS:  I have reviewed radiologic plain film image(s). ALL OTHER NON-PLAIN FILM IMAGES SUCH AS CT, ULTRASOUND AND MRI HAVE BEEN READ BY THE RADIOLOGIST. XR CHEST PORTABLE   Final Result   No acute process. CT HEAD WO CONTRAST   Final Result   No acute intracranial abnormality. The findings were sent to the Radiology Results [id number] at 1:17   pm on 12/11/2022 and subsequently communicated to Dr. Joey Dotson at 1:25 p.m. Rechecks: Physical assessment performed. 1315: Blood pressure 81/51.      1330: Blood pressure 81/49.      1345: Blood pressure 82/44.             Critical Care: I personally saw the patient and independently provided 35 minutes of non-concurrent critical care out of the total shared critical care time provided. Comfort care measures desired by family. Do not wish to proceed with antibiotics or additional life-saving care at this time. Hospice/ consulted. ED COURSE/MDM  Patient seen and evaluated. Old records reviewed. Labs and imaging reviewed and results discussed with patient. Patient was given normal saline and Ativan patient's labs are consistent with sepsis/multisystem organ dysfunction. Patient was reassessed as noted above. Upon discussion with family, family desires hospice care. Hospice/ was notified. . Plan of care discussed with patient and family. Patient and family in agreement with plan. Patient was given scripts for the following medications. I counseled patient how to take these medications. New Prescriptions    No medications on file       CLINICAL IMPRESSION  1. Acute encephalopathy    2. Acute cystitis without hematuria    3. Septic shock (HCC)        Blood pressure (!) 103/53, pulse 76, resp. rate 19, height 6' 4\" (1.93 m), weight 175 lb (79.4 kg), SpO2 98 %. DISPOSITION  Cecil Kamara was discharged to hospice.           Anusha Becker DO  12/11/22 [id number]

## 2022-12-11 NOTE — ED NOTES
Per case management, hospice will be contacting family directly to discuss inpatient hospice.  will then let ED staff know of the arrangements. Dr. Clare Craven updated.  has arrived.       Tami Mitchell RN  12/11/22 [id number]

## 2022-12-11 NOTE — ED NOTES
Family at bedside with Dr. Tiana Gage discussing code status     Vaishnavi [facility]  12/11/22 [phone]

## 2022-12-15 LAB
CULTURE, BLOOD 2: NORMAL
REPORT: NORMAL

## 2022-12-15 NOTE — DISCHARGE SUMMARY
Hospital Medicine Discharge Summary    Patient ID: Matilde Walsh      Patient's PCP: Karol Palomo    Admit Date: 11/28/2022     Discharge Date: 12/9/2022     Admitting Provider: Amaya Barton MD     Discharge Provider: Amaya Barton MD     Discharge Diagnoses: Active Hospital Problems    Diagnosis     [facility]) [facility] [N17.9]      Priority: Medium    Diabetic peripheral neuropathy ([facility] [E11.42]      Priority: Medium    Acute combined systolic and diastolic heart failure (HCC) [I50.41]     Alzheimer's disease ([facility] [G30.9, F02.80]     UTI (urinary tract infection) [N39.0]     Essential hypertension [I10]     Mixed hyperlipidemia [E78.2]     BPH with obstruction/lower urinary tract symptoms [N40.1, N13.8]        The patient was seen and examined on day of discharge and this discharge summary is in conjunction with any daily progress note from day of discharge. Hospital Course:      Enteroccocus UTI - Patient with elevated procalcitonin. Discontinued Cefepime and Vancomycin. Completed Macrobid. Unstageable sacral decubitus ulcer - Wound care team is following. Continue frequent turns. MIYA - secondary to postobstructive uropathy. Patient will continue Valenzuela catheter in place w/ void trial in process 7 Dec.  Urology consulted and appreciated. Creatinine normalized. Encephalopathy - acute metabolic, 2nd to above. Will continue to follow clinical response w/ supportive care PRN. Dementia - w/out behavioral disturbance. Controlled on home medication regimen - continued. Continue supportive care and redirection as needed. Haldol prn. HTN - w/out known CAD and no evidence of active signs/sxs of ischemia/failure. Currently controlled on home meds w/ vitals reviewed and documented as above. HyperLipidemia - controlled on home Statin.  Continue, w/ f/u and med adjustment w/ PCP     Afib - chronic paroxysmal of unspecified and clinically unable to 12/11/2022 12:51 PM     12/11/2022 12:51 PM       Renal:    Lab Results   Component Value Date/Time     12/11/2022 12:51 PM    K 5.2 12/11/2022 12:51 PM    K 4.3 11/29/2022 12:30 AM    CL 92 12/11/2022 12:51 PM    CO2 22 12/11/2022 12:51 PM    BUN 62 12/11/2022 12:51 PM    CREATININE 3.4 12/11/2022 12:51 PM    CALCIUM 9.2 12/11/2022 12:51 PM    PHOS 3.4 12/08/2022 06:53 AM         Significant Diagnostic Studies    Radiology:   CT CERVICAL SPINE WO CONTRAST   Final Result   No acute abnormality of the cervical spine. CT HEAD WO CONTRAST   Final Result   No acute intracranial abnormality. Moderate diffuse cerebral atrophy. Mild cerebral white matter chronic microvascular ischemic disease. Bilateral maxillary chronic sinusitis. XR CHEST PORTABLE   Final Result   No acute cardiopulmonary abnormality. Lung parenchymal changes suggestive of chronic interstitial lung disease. Consults:     IP CONSULT TO UROLOGY  IP CONSULT TO PHARMACY  IP CONSULT TO UROLOGY  IP CONSULT TO PSYCHIATRY  IP CONSULT TO UROLOGY    Disposition: SNF    Condition at Discharge: Stable    Discharge Instructions/Follow-up:  PCP, Urology    Code Status:  FC    Activity: activity as tolerated    Diet: regular diet      Discharge Medications:     Discharge Medication List as of 12/9/2022 12:18 PM             Details   haloperidol (HALDOL) 0.5 MG tablet Take 1 tablet by mouth every 6 hours as needed for Agitation, Disp-120 tablet, R-3DC to SNF      tamsulosin (FLOMAX) 0.4 MG capsule Take 1 capsule by mouth daily, Disp-30 capsule, R-3Print      collagenase 250 UNIT/GM ointment Apply topically daily. , Disp-1 each, R-0, DC to SNF                Details   atorvastatin (LIPITOR) 40 MG tablet Take 40 mg by mouth nightlyHistorical Med      apixaban (ELIQUIS) 5 MG TABS tablet Take 5 mg by mouth 2 times dailyHistorical Med      divalproex (DEPAKOTE SPRINKLE) 125 MG capsule Take 125 mg by mouth 2 times dailyHistorical Med      insulin lispro (HUMALOG) 100 UNIT/ML injection vial Inject 0-6 Units into the skin 2 times daily (with meals) SLSC  < 250- No coverage  251-300  give 2 units of insulin  301-350  give 4 units of insulin  351-400  give 6 units of insulin  > 400- Call MDHistorical Med      sertraline (ZOLOFT) 25 MG tablet Take 75 mg by mouth daily Takes 25mg and 50mg to total 75mg doseHistorical Med      mirabegron (MYRBETRIQ) 25 MG TB24 Take 25 mg by mouth dailyHistorical Med      metoprolol succinate (TOPROL XL) 25 MG extended release tablet Take 25 mg by mouth dailyHistorical Med      melatonin 5 MG TABS tablet Take 10 mg by mouth nightlyHistorical Med      Insulin Glargine, 2 Unit Dial, (TOUJEO MAX SOLOSTAR) 300 UNIT/ML SOPN Inject 18 Units into the skin nightlyHistorical Med      vitamin B-12 (CYANOCOBALAMIN) 1000 MCG tablet Take 1,000 mcg by mouth dailyHistorical Med      QUEtiapine (SEROQUEL) 25 MG tablet Take 25 mg by mouth Daily with supperHistorical Med      metFORMIN (GLUCOPHAGE) 850 MG tablet TAKE ONE TABLET BY MOUTH TWICE A DAY WITH MEALS, Disp-180 tablet, R-1Normal      Continuous Blood Gluc Sensor (FREESTYLE SHILOH 14 DAY SENSOR) MISC 1 each by Does not apply route continuous, Disp-2 each, R-5Normal      !! glucose blood VI test strips (ONE TOUCH ULTRA TEST) strip Disp-100 strip, R-3, NormalUSE TO TEST ONCE DAILY  E11.9      ! ! ACCU-CHEK BILLY strip TEST TWO TIMES A DAY, Disp-50 strip, R-2CWR [phone]       !! - Potential duplicate medications found. Please discuss with provider. Time Spent on discharge: 34 in the examination, evaluation, counseling and review of medications and discharge plan. Signed:    Nelida Heck MD   12/15/2022      Thank you Mariana Rodriguez for the opportunity to be involved in this patient's care. If you have any questions or concerns, please feel free to contact me at [phone].

## 2022-12-16 NOTE — ED NOTES
[facility] called with positive blood cultures. E. Coli in both bottles. Patient was DC'd to inpatient Hospice. Spoke with [place] at [facility] and notified of results.      Betty Gomez RN  12/15/22 [id number]

## 2022-12-17 LAB
BLOOD CULTURE, ROUTINE: ABNORMAL
BLOOD CULTURE, ROUTINE: ABNORMAL
ORGANISM: ABNORMAL
ORGANISM: ABNORMAL

## 2022-12-19 NOTE — RESULT ENCOUNTER NOTE
Culture result reviewed. Nurse informed hospice about result but since discharged to inpatient hospice, no further treatment recommended at this time and Dr. Kirstin Hensley was aware of septic shock concern when seen in the ED.

## 2023-01-04 PROBLEM — N39.0 UTI (URINARY TRACT INFECTION): Status: RESOLVED | Noted: 2018-01-05 | Resolved: 2023-01-04

## 2023-04-21 NOTE — PLAN OF CARE
Patient noted to have minimal urine in brief since catheter removal at 0900. Bladder scanned for 274 ml. MD made aware. Will continue to monitor. 1503: Spoke with MD. No further orders. Continue to monitor for now. Will continue to assess urinary output and bladder scan as needed. 104